# Patient Record
Sex: MALE | Race: BLACK OR AFRICAN AMERICAN | NOT HISPANIC OR LATINO | Employment: OTHER | URBAN - METROPOLITAN AREA
[De-identification: names, ages, dates, MRNs, and addresses within clinical notes are randomized per-mention and may not be internally consistent; named-entity substitution may affect disease eponyms.]

---

## 2017-11-14 ENCOUNTER — TELEPHONE (OUTPATIENT)
Dept: NEUROLOGY | Facility: HOSPITAL | Age: 67
End: 2017-11-14

## 2017-11-14 NOTE — TELEPHONE ENCOUNTER
Clinic appt scheduled with Karen, wife, on Wednesday, November 29, 2017 at 1045am.  Karen given clinic address and telephone number.  Karen repeated information correctly.

## 2017-11-29 ENCOUNTER — OFFICE VISIT (OUTPATIENT)
Dept: NEUROLOGY | Facility: HOSPITAL | Age: 67
End: 2017-11-29
Attending: INTERNAL MEDICINE
Payer: MEDICARE

## 2017-11-29 VITALS
DIASTOLIC BLOOD PRESSURE: 80 MMHG | RESPIRATION RATE: 16 BRPM | HEART RATE: 80 BPM | TEMPERATURE: 98 F | WEIGHT: 177 LBS | BODY MASS INDEX: 28.45 KG/M2 | HEIGHT: 66 IN | SYSTOLIC BLOOD PRESSURE: 190 MMHG

## 2017-11-29 DIAGNOSIS — D50.0 ANEMIA DUE TO CHRONIC BLOOD LOSS: Primary | ICD-10-CM

## 2017-11-29 PROCEDURE — 99215 OFFICE O/P EST HI 40 MIN: CPT | Performed by: INTERNAL MEDICINE

## 2017-11-29 RX ORDER — GLIPIZIDE 5 MG/1
5 TABLET ORAL
COMMUNITY

## 2017-11-29 RX ORDER — LEVOTHYROXINE SODIUM 50 UG/1
50 TABLET ORAL DAILY
COMMUNITY

## 2017-11-29 RX ORDER — NEBIVOLOL 2.5 MG/1
10 TABLET ORAL DAILY
COMMUNITY

## 2017-11-29 RX ORDER — MINOXIDIL 2.5 MG/1
2.5 TABLET ORAL 2 TIMES DAILY
COMMUNITY

## 2017-11-29 RX ORDER — DOXAZOSIN 2 MG/1
2 TABLET ORAL NIGHTLY
COMMUNITY

## 2017-11-29 RX ORDER — METOLAZONE 5 MG/1
5 TABLET ORAL DAILY
COMMUNITY

## 2017-11-29 RX ORDER — FUROSEMIDE 80 MG/1
120 TABLET ORAL 2 TIMES DAILY
COMMUNITY

## 2017-11-29 RX ORDER — PANTOPRAZOLE SODIUM 40 MG/1
40 TABLET, DELAYED RELEASE ORAL 2 TIMES DAILY
Status: ON HOLD | COMMUNITY
End: 2018-08-27 | Stop reason: HOSPADM

## 2017-11-29 RX ORDER — CINACALCET HYDROCHLORIDE 30 MG/1
60 TABLET, COATED ORAL
COMMUNITY

## 2017-11-29 RX ORDER — AMLODIPINE BESYLATE 10 MG/1
10 TABLET ORAL DAILY
COMMUNITY

## 2017-11-29 RX ORDER — RAMIPRIL 10 MG/1
10 CAPSULE ORAL DAILY
COMMUNITY

## 2017-11-29 NOTE — PROGRESS NOTES
[facility]    CC: anemia    HPI 67 y.o. male here for initial evaluation of recurrent, severe, normocytic anemia not associated with overt GI bleeding, but associated positive occult stool testing for blood and iron deficiency.  Pertinent medical history includes ESRD (on HD), and history of anemia of renal disease and chronic inflammation.  He reports first developing symptomatic anemia ~ 1 year ago.  He has averaged 2-4 units of pRBCS each month for the last year, without overt bleeding.    He has received EGD x 2, colonoscopy which were unrevealing and are reviewed below.  He also reports completing an unrevealing VCE, although this report is not available to me currently.  He was referred by Dr. Telles, who completed a bone marrow biopsy and aspirate which was also unrevealing.      He denies past or present significant NSAID use, but is a former smoker and moderate drinker (quit 20 years ago).  He has never taken olmesartan to his knowledge.  He has been taking oral iron.      Past medical history  HTN  DM  ESRD (on HD).  Formerly on home PD but complicated by Staph A infection.  Hypovitaminosis D  Anemia of renal disease  Anemia of chronic inflammation  Iron deficiency anemia    Past surgical history  PD catheter placement  LUE AV fistula placement    Social History  Former smoker, quit 20 years ago  Former drinker, quit 20 years ago    Family history:  Negative for GI malignancy or IBD    Review of Systems  General ROS: negative for chills, fever or weight loss, +decreased appetite  Psychological ROS: negative for hallucination, depression or suicidal ideation  Ophthalmic ROS: negative for blurry vision, photophobia or eye pain  ENT ROS: negative for epistaxis, sore throat or rhinorrhea  Respiratory ROS: no cough, shortness of breath, or wheezing  Cardiovascular ROS: no chest pain, +dyspnea on exertion  Gastrointestinal ROS: no abdominal pain, change in bowel habits, or black/ bloody  "stools  Genito-Urinary ROS: no dysuria, trouble voiding, or hematuria  Musculoskeletal ROS: negative for gait disturbance, +muscular weakness  Neurological ROS: no syncope or seizures; no ataxia  Dermatological ROS: negative for pruritis, rash and jaundice    Physical Examination  BP (!) 190/80 (BP Location: Right arm)   Pulse 80   Temp 98.3 °F (36.8 °C) (Oral)   Resp 16   Ht 5' 6" (1.676 m)   Wt 80.3 kg (177 lb 0.5 oz)   BMI 28.57 kg/m²   General appearance: alert, cooperative, no distress  HENT: Normocephalic, atraumatic, neck symmetrical, no nasal discharge   Eyes: conjunctivae/corneas clear, PERRL, EOM's intact  Lungs: clear to auscultation bilaterally, no dullness to percussion bilaterally  Heart: regular rate and rhythm without rub; no displacement of the PMI   Abdomen: soft, non-tender; bowel sounds normoactive; no organomegaly  Extremities: extremities symmetric; no clubbing, cyanosis, or edema, AV fistula to left upper extremity with palpable thrill  Integument: Skin color, texture, turgor normal; no rashes; hair distrubution normal  Neurologic: Alert and oriented X 3, normal strength, normal coordination and gait  Psychiatric: no pressured speech; normal affect; no evidence of impaired cognition     Labs:    OSH labs reviewed.  Noted to have pancytopenia with leukopenia (2.4), normocyctic anemia, and mild thrombocytopenia.    Imaging:    CXR 5/2016:    No active cardiopulmonary process    Endoscopic reports ([facility]):    EGD 2/22/17:    Impression  - Small hiatal hernia  - Multiple ulcers in the body, antrum, and angularis, clean based and 3-6mm, with biopsies positive for H. Pylori    Colonoscopy 2/15/17:    Impression  - Good quality prep  - small internal hemorrhoids  - 5mm pedunculated transverse colon polyp, snare removed. (TA without dysplasia)    EGD 7/2017:    Impression  - 2cm hiatal hernia  - healed pyloric channel ulcer with friability  - small volume of retained " food    Assessment:   67 year old AAM with multiple medical problems, with the most pertinent being ESRD on HD, and multifactorial anemia including iron deficiency, renal disease, and chronic inflammation.  Here for evaluation of VEENA with positive occult stool testing, with unrevealing EGD x 2, and colonoscopy.  VCE also reportedly unrevealing but a report is not currently available.  He has multifactorial anemia, but is occult positive and has a profound transfusion requirement every 2-3 weeks.  I suspect he may have angioectasias of the stomach/proximal small bowel/right colon, as he has multiple risks factors for this common problem including his age and ESRD, and these lesions can be missed on endoscopy and VCE.    Plan:   - I will schedule him for an EGD with push enteroscopy on December 14th with close examination of the small bowel and ablation of any lesions found  - He will need a stat CBC and CMP drawn on first arrival on day of endoscopy  - I have requested that he bring a copy of his VCE report on the day of his endoscopy for review although it was reportedly red as normal  - If push enteroscopy is unrevealing, I will schedule a provocative VCE as the next step:  Plavix 300mg (6pm evening before) and pradaxa 150mg (first dose 6pm night before) and second dose of pradaxa 150mg on morning of endoscopy.  - If the 2nd look capsule with pradaxa and plavix provocation fails to demonstrate any blood in the GI tract, a chronic GI bleed source seems unlikely such that anemia primarily related to hemolysis or inadequate production seems most likely - will need to discuss this case with his hematologist, Dr. Telles, at that time       Moustapha Guillen MD   [address] [place] [address], [address]   [place] [address] [phone]

## 2017-11-29 NOTE — PATIENT INSTRUCTIONS
You are scheduled for an EGD on ___Thursday, December 14, 2017_____    You should eat light meals the day before the procedure and nothing to eat or drink after midnight the night before your procedure.    You will need to be at the 1st floor admission desk at the hospital on Endoscopy staff to contact with arrival time__

## 2017-12-05 ENCOUNTER — TELEPHONE (OUTPATIENT)
Dept: NEUROLOGY | Facility: HOSPITAL | Age: 67
End: 2017-12-05

## 2017-12-05 NOTE — TELEPHONE ENCOUNTER
----- Message from Jennifer Bain LPN sent at 11/29/2017 11:56 AM CST -----  EGD on 12/14/17.  CPT 32607, [id number]..     Thanks.

## 2017-12-14 ENCOUNTER — HOSPITAL ENCOUNTER (OUTPATIENT)
Facility: HOSPITAL | Age: 67
Discharge: HOME | End: 2017-12-14
Attending: INTERNAL MEDICINE | Admitting: INTERNAL MEDICINE
Payer: MEDICARE

## 2017-12-14 ENCOUNTER — ANESTHESIA (OUTPATIENT)
Dept: ENDOSCOPY | Facility: HOSPITAL | Age: 67
End: 2017-12-14
Payer: MEDICARE

## 2017-12-14 ENCOUNTER — ANESTHESIA EVENT (OUTPATIENT)
Dept: ENDOSCOPY | Facility: HOSPITAL | Age: 67
End: 2017-12-14
Payer: MEDICARE

## 2017-12-14 ENCOUNTER — SURGERY (OUTPATIENT)
Age: 67
End: 2017-12-14

## 2017-12-14 DIAGNOSIS — D50.0 BLOOD LOSS ANEMIA: Primary | ICD-10-CM

## 2017-12-14 DIAGNOSIS — K55.20 ANGIODYSPLASIA: ICD-10-CM

## 2017-12-14 LAB
ALBUMIN SERPL BCP-MCNC: 3.1 G/DL
ALP SERPL-CCNC: 53 U/L
ALT SERPL W/O P-5'-P-CCNC: 10 U/L
ANION GAP SERPL CALC-SCNC: 13 MMOL/L
AST SERPL-CCNC: 22 U/L
BASOPHILS # BLD AUTO: 0.05 K/UL
BASOPHILS NFR BLD: 1.4 %
BILIRUB SERPL-MCNC: 0.4 MG/DL
BUN SERPL-MCNC: 64 MG/DL
CALCIUM SERPL-MCNC: 8.1 MG/DL
CHLORIDE SERPL-SCNC: 97 MMOL/L
CO2 SERPL-SCNC: 30 MMOL/L
CREAT SERPL-MCNC: 12.2 MG/DL
DIFFERENTIAL METHOD: ABNORMAL
EOSINOPHIL # BLD AUTO: 0.2 K/UL
EOSINOPHIL NFR BLD: 4.1 %
ERYTHROCYTE [DISTWIDTH] IN BLOOD BY AUTOMATED COUNT: 16.3 %
EST. GFR  (AFRICAN AMERICAN): 4 ML/MIN/1.73 M^2
EST. GFR  (NON AFRICAN AMERICAN): 4 ML/MIN/1.73 M^2
GLUCOSE SERPL-MCNC: 112 MG/DL (ref 70–110)
GLUCOSE SERPL-MCNC: 98 MG/DL
HCT VFR BLD AUTO: 25.2 %
HGB BLD-MCNC: 7.4 G/DL
LYMPHOCYTES # BLD AUTO: 0.4 K/UL
LYMPHOCYTES NFR BLD: 9.7 %
MCH RBC QN AUTO: 26.6 PG
MCHC RBC AUTO-ENTMCNC: 29.4 G/DL
MCV RBC AUTO: 91 FL
MONOCYTES # BLD AUTO: 0.5 K/UL
MONOCYTES NFR BLD: 14.6 %
NEUTROPHILS # BLD AUTO: 2.6 K/UL
NEUTROPHILS NFR BLD: 70.2 %
PLATELET # BLD AUTO: 122 K/UL
PMV BLD AUTO: 11.3 FL
POTASSIUM SERPL-SCNC: 3.6 MMOL/L
PROT SERPL-MCNC: 6.7 G/DL
RBC # BLD AUTO: 2.78 M/UL
SODIUM SERPL-SCNC: 140 MMOL/L
WBC # BLD AUTO: 3.7 K/UL

## 2017-12-14 PROCEDURE — 36415 COLL VENOUS BLD VENIPUNCTURE: CPT

## 2017-12-14 PROCEDURE — 80053 COMPREHEN METABOLIC PANEL: CPT

## 2017-12-14 PROCEDURE — 44799 UNLISTED PX SMALL INTESTINE: CPT | Performed by: INTERNAL MEDICINE

## 2017-12-14 PROCEDURE — 37000008 HC ANESTHESIA 1ST 15 MINUTES: Performed by: INTERNAL MEDICINE

## 2017-12-14 PROCEDURE — 63600175 PHARM REV CODE 636 W HCPCS: Performed by: NURSE ANESTHETIST, CERTIFIED REGISTERED

## 2017-12-14 PROCEDURE — 82962 GLUCOSE BLOOD TEST: CPT | Performed by: INTERNAL MEDICINE

## 2017-12-14 PROCEDURE — 85025 COMPLETE CBC W/AUTO DIFF WBC: CPT

## 2017-12-14 PROCEDURE — 37000009 HC ANESTHESIA EA ADD 15 MINS: Performed by: INTERNAL MEDICINE

## 2017-12-14 PROCEDURE — 44366 SMALL BOWEL ENDOSCOPY: CPT | Performed by: INTERNAL MEDICINE

## 2017-12-14 PROCEDURE — 27201028 HC NEEDLE, SCLERO: Performed by: INTERNAL MEDICINE

## 2017-12-14 PROCEDURE — 25000003 PHARM REV CODE 250: Performed by: INTERNAL MEDICINE

## 2017-12-14 PROCEDURE — 27202087 HC PROBE, APC: Performed by: INTERNAL MEDICINE

## 2017-12-14 RX ORDER — LIDOCAINE HCL/PF 100 MG/5ML
SYRINGE (ML) INTRAVENOUS
Status: DISCONTINUED | OUTPATIENT
Start: 2017-12-14 | End: 2017-12-14

## 2017-12-14 RX ORDER — SODIUM CHLORIDE 9 MG/ML
INJECTION, SOLUTION INTRAVENOUS CONTINUOUS
Status: DISCONTINUED | OUTPATIENT
Start: 2017-12-14 | End: 2017-12-14 | Stop reason: HOSPADM

## 2017-12-14 RX ORDER — FENTANYL CITRATE 50 UG/ML
INJECTION, SOLUTION INTRAMUSCULAR; INTRAVENOUS
Status: DISCONTINUED | OUTPATIENT
Start: 2017-12-14 | End: 2017-12-14

## 2017-12-14 RX ORDER — PROPOFOL 10 MG/ML
VIAL (ML) INTRAVENOUS
Status: DISCONTINUED | OUTPATIENT
Start: 2017-12-14 | End: 2017-12-14

## 2017-12-14 RX ORDER — PROPOFOL 10 MG/ML
VIAL (ML) INTRAVENOUS CONTINUOUS PRN
Status: DISCONTINUED | OUTPATIENT
Start: 2017-12-14 | End: 2017-12-14

## 2017-12-14 RX ADMIN — FENTANYL CITRATE 25 MCG: 50 INJECTION, SOLUTION INTRAMUSCULAR; INTRAVENOUS at 10:12

## 2017-12-14 RX ADMIN — PROPOFOL 100 MCG/KG/MIN: 10 INJECTION, EMULSION INTRAVENOUS at 10:12

## 2017-12-14 RX ADMIN — PROPOFOL 50 MG: 10 INJECTION, EMULSION INTRAVENOUS at 10:12

## 2017-12-14 RX ADMIN — LIDOCAINE HYDROCHLORIDE 75 MG: 20 INJECTION, SOLUTION INTRAVENOUS at 10:12

## 2017-12-14 RX ADMIN — SODIUM CHLORIDE: 0.9 INJECTION, SOLUTION INTRAVENOUS at 08:12

## 2017-12-14 NOTE — ANESTHESIA PREPROCEDURE EVALUATION
12/14/2017  Ki Roque is a 67 y.o., male for EGD under MAC.    Anesthesia Evaluation    I have reviewed the Patient Summary Reports.    I have reviewed the Nursing Notes.      Review of Systems  Anesthesia Hx:  No problems with previous Anesthesia Denies Hx of Anesthetic complications    Hematology/Oncology:         -- Anemia:   Cardiovascular:   Hypertension    Renal/:   Chronic Renal Disease, ESRD, Dialysis    Endocrine:   Diabetes        Physical Exam  General:  Well nourished    Airway/Jaw/Neck:  Airway Findings:      Chest/Lungs:  Chest/Lungs Findings:    Heart/Vascular:  Heart Findings:       Mental Status:  Mental Status Findings:       Lab Results   Component Value Date    WBC 3.70 (L) 12/14/2017    HGB 7.4 (L) 12/14/2017    HCT 25.2 (L) 12/14/2017     (L) 12/14/2017    ALT 10 12/14/2017    AST 22 12/14/2017     12/14/2017    K 3.6 12/14/2017    CL 97 12/14/2017    CREATININE 12.2 (H) 12/14/2017    BUN 64 (H) 12/14/2017    CO2 30 (H) 12/14/2017         Anesthesia Plan  Type of Anesthesia, risks & benefits discussed:  Anesthesia Type:  MAC, general  Patient's Preference:   Intra-op Monitoring Plan: standard ASA monitors  Intra-op Monitoring Plan Comments:   Post Op Pain Control Plan: multimodal analgesia  Post Op Pain Control Plan Comments:   Induction:    Beta Blocker:  Patient is not currently on a Beta-Blocker (No further documentation required).       Informed Consent: Patient understands risks and agrees with Anesthesia plan.  Questions answered. Anesthesia consent signed with patient.  ASA Score: 3     Day of Surgery Review of History & Physical:        Anesthesia Plan Notes: Labs pending        Ready For Surgery From Anesthesia Perspective.

## 2017-12-14 NOTE — DISCHARGE INSTRUCTIONS
Post EGD Discharge Instruction    Ki Roque  12/14/2017  Moustapha Guillen MD    RESTRICTIONS ON ACTIVITY:    -DO NOT drive a car, operate machinery or make critical decisions, or do activities that require coordination or balance for 24 hours.  -Following Day: Return to full activities including work.  -Diet: Eat and drink normally unless instructed otherwise.    TREATMENT FOR COMMON SIDE EFFECTS:  *Sore Throat - treat with throat lozenges, gargle with warm salt water.  *Mild abdominal pain & bloating- rest and take liquids only.    SYMPTOMS TO WATCH FOR AND REPORT TO YOUR PHYSICIAN:  1. Chills or fever occurring 24 hours after procedure.  2. Pain in chest.  3. SEVERE abdominal pain or bloating.  4. Rectal bleeding which could be maroon or black.    If you have any questions or problems, please call your Physician:    Moustapha Guillen MD     If a complication or emergency situation arises and you are unable to reach your Physician - GO TO THE EMERGENCY ROOM.

## 2017-12-14 NOTE — TRANSFER OF CARE
"Anesthesia Transfer of Care Note    Patient: Ki Roque    Procedure(s) Performed: Procedure(s) (LRB):  ESOPHAGOGASTRODUODENOSCOPY (EGD)  with push enteroscopy (N/A)    Patient location: GI    Anesthesia Type: MAC    Transport from OR: Transported from OR on room air with adequate spontaneous ventilation    Post pain: adequate analgesia    Post assessment: no apparent anesthetic complications    Post vital signs: stable    Level of consciousness: awake    Nausea/Vomiting: no nausea/vomiting    Complications: none    Transfer of care protocol was followed      Last vitals:   Visit Vitals  BP (!) 183/70   Pulse 80   Temp 37.4 °C (99.4 °F)   Resp 20   Ht 5' 6" (1.676 m)   Wt 83 kg (183 lb)   SpO2 97%   BMI 29.54 kg/m²     "

## 2017-12-14 NOTE — H&P
[facility]     CC: anemia     HPI 67 y.o. male here for initial evaluation of recurrent, severe, normocytic anemia not associated with overt GI bleeding, but associated positive occult stool testing for blood and iron deficiency.  Pertinent medical history includes ESRD (on HD), and history of anemia of renal disease and chronic inflammation.  He reports first developing symptomatic anemia ~ 1 year ago.  He has averaged 2-4 units of pRBCS each month for the last year, without overt bleeding.     He has received EGD x 2, colonoscopy which were unrevealing and are reviewed below.  He also reports completing an unrevealing VCE, although this report is not available to me currently.  He was referred by Dr. Telles, who completed a bone marrow biopsy and aspirate which was also unrevealing.       He denies past or present significant NSAID use, but is a former smoker and moderate drinker (quit 20 years ago).  He has never taken olmesartan to his knowledge.  He has been taking oral iron.        Past medical history  HTN  DM  ESRD (on HD).  Formerly on home PD but complicated by Staph A infection.  Hypovitaminosis D  Anemia of renal disease  Anemia of chronic inflammation  Iron deficiency anemia     Past surgical history  PD catheter placement  LUE AV fistula placement     Social History  Former smoker, quit 20 years ago  Former drinker, quit 20 years ago     Family history:  Negative for GI malignancy or IBD     Review of Systems  General ROS: negative for chills, fever or weight loss, +decreased appetite  Psychological ROS: negative for hallucination, depression or suicidal ideation  Ophthalmic ROS: negative for blurry vision, photophobia or eye pain  ENT ROS: negative for epistaxis, sore throat or rhinorrhea  Respiratory ROS: no cough, shortness of breath, or wheezing  Cardiovascular ROS: no chest pain, +dyspnea on exertion  Gastrointestinal ROS: no abdominal pain, change in bowel habits, or black/ bloody  "stools  Genito-Urinary ROS: no dysuria, trouble voiding, or hematuria  Musculoskeletal ROS: negative for gait disturbance, +muscular weakness  Neurological ROS: no syncope or seizures; no ataxia  Dermatological ROS: negative for pruritis, rash and jaundice     Physical Examination  BP (!) 190/80 (BP Location: Right arm)   Pulse 80   Temp 98.3 °F (36.8 °C) (Oral)   Resp 16   Ht 5' 6" (1.676 m)   Wt 80.3 kg (177 lb 0.5 oz)   BMI 28.57 kg/m²   General appearance: alert, cooperative, no distress  HENT: Normocephalic, atraumatic, neck symmetrical, no nasal discharge   Eyes: conjunctivae/corneas clear, PERRL, EOM's intact  Lungs: clear to auscultation bilaterally, no dullness to percussion bilaterally  Heart: regular rate and rhythm without rub; no displacement of the PMI   Abdomen: soft, non-tender; bowel sounds normoactive; no organomegaly  Extremities: extremities symmetric; no clubbing, cyanosis, or edema, AV fistula to left upper extremity with palpable thrill  Integument: Skin color, texture, turgor normal; no rashes; hair distrubution normal  Neurologic: Alert and oriented X 3, normal strength, normal coordination and gait  Psychiatric: no pressured speech; normal affect; no evidence of impaired cognition      Labs:     OSH labs reviewed.  Noted to have pancytopenia with leukopenia (2.4), normocyctic anemia, and mild thrombocytopenia.     Imaging:     CXR 5/2016:     No active cardiopulmonary process     Endoscopic reports ([facility]):     EGD 2/22/17:     Impression  - Small hiatal hernia  - Multiple ulcers in the body, antrum, and angularis, clean based and 3-6mm, with biopsies positive for H. Pylori     Colonoscopy 2/15/17:     Impression  - Good quality prep  - small internal hemorrhoids  - 5mm pedunculated transverse colon polyp, snare removed. (TA without dysplasia)     EGD 7/2017:     Impression  - 2cm hiatal hernia  - healed pyloric channel ulcer with friability  - small volume of " retained food     Assessment:   67 year old AAM with multiple medical problems, with the most pertinent being ESRD on HD, and multifactorial anemia including iron deficiency, renal disease, and chronic inflammation.  Here for evaluation of VEENA with positive occult stool testing, with unrevealing EGD x 2, and colonoscopy.  VCE also reportedly unrevealing but a report is not currently available.  He has multifactorial anemia, but is occult positive and has a profound transfusion requirement every 2-3 weeks.  I suspect he may have angioectasias of the stomach/proximal small bowel/right colon, as he has multiple risks factors for this common problem including his age and ESRD, and these lesions can be missed on endoscopy and VCE.     Plan:   - I will schedule him for an EGD with push enteroscopy on today with close examination of the small bowel and ablation of any lesions found    - He will need a stat CBC and CMP drawn on first arrival on day of endoscopy  - I have requested that he bring a copy of his VCE report on the day of his endoscopy for review although it was reportedly red as normal  - If push enteroscopy is unrevealing, I will schedule a provocative VCE as the next step:  Plavix 300mg (6pm evening before) and pradaxa 150mg (first dose 6pm night before) and second dose of pradaxa 150mg on morning of endoscopy.  - If the 2nd look capsule with pradaxa and plavix provocation fails to demonstrate any blood in the GI tract, a chronic GI bleed source seems unlikely such that anemia primarily related to hemolysis or inadequate production seems most likely - will need to discuss this case with his hematologist, Dr. Telles, at that time         Moustapha Guillen MD   [address] [place] [address], [address]   [place] [address] [phone]

## 2017-12-14 NOTE — ANESTHESIA POSTPROCEDURE EVALUATION
"Anesthesia Post Evaluation    Patient: Ki Roque    Procedure(s) Performed: Procedure(s) (LRB):  ESOPHAGOGASTRODUODENOSCOPY (EGD)  with push enteroscopy (N/A)    Final Anesthesia Type: MAC  Patient location during evaluation: GI PACU  Patient participation: Yes- Able to Participate  Level of consciousness: awake and alert  Post-procedure vital signs: reviewed and stable  Pain management: adequate  Airway patency: patent  PONV status at discharge: No PONV  Anesthetic complications: no      Cardiovascular status: blood pressure returned to baseline and hemodynamically stable  Respiratory status: unassisted, spontaneous ventilation and room air  Hydration status: euvolemic  Follow-up not needed.        Visit Vitals  BP (!) 183/70   Pulse 80   Temp 37.4 °C (99.4 °F)   Resp 20   Ht 5' 6" (1.676 m)   Wt 83 kg (183 lb)   SpO2 97%   BMI 29.54 kg/m²       Pain/Megan Score: Pain Assessment Performed: Yes (12/14/2017  8:16 AM)  Presence of Pain: denies (12/14/2017  8:16 AM)      "

## 2017-12-15 VITALS
DIASTOLIC BLOOD PRESSURE: 63 MMHG | HEIGHT: 66 IN | BODY MASS INDEX: 29.41 KG/M2 | OXYGEN SATURATION: 91 % | HEART RATE: 71 BPM | RESPIRATION RATE: 16 BRPM | WEIGHT: 183 LBS | TEMPERATURE: 99 F | SYSTOLIC BLOOD PRESSURE: 137 MMHG

## 2017-12-28 ENCOUNTER — TELEPHONE (OUTPATIENT)
Dept: NEUROLOGY | Facility: HOSPITAL | Age: 67
End: 2017-12-28

## 2017-12-28 NOTE — TELEPHONE ENCOUNTER
Per Mima with Dr. Manriquez's office, pt results from weekly hgb today 6.6, a week ago-7.0.  Dr. Manriquez would like to know Dr. Guillen 's recommendation. Mima given Dr. Guillen recommendations from procedure on 12/14/17:if pt's anemia fails to improve the plan is to repeat SBE technique to evaluate for any additional angioectasias which might be amenable to ablation. If this is unrevealing,  plan a provocative upper DBE prior to applying for sandostatin injections.  Mima/Dr. Manriquez to be notified with scheduling of repeat SBE.  Mima acknowledged understanding.

## 2017-12-28 NOTE — TELEPHONE ENCOUNTER
----- Message from Marcela Crenshaw sent at 12/28/2017  2:30 PM CST -----  Contact: Mima with Dr Declan LONGORIA- Mima with Dr Lopez office would like to speak to the nurse in regards to lab results. Call back number is [phone] option 4.

## 2018-01-03 ENCOUNTER — TELEPHONE (OUTPATIENT)
Dept: NEUROLOGY | Facility: HOSPITAL | Age: 68
End: 2018-01-03

## 2018-01-03 DIAGNOSIS — D50.0 BLOOD LOSS ANEMIA: Primary | ICD-10-CM

## 2018-01-03 NOTE — TELEPHONE ENCOUNTER
Repeat Upper SBE scheduled with pt on Monday, January 22, 2018. Pt given instructions as follows: You are scheduled for an Upper SBE on _________________________________    You should eat light meals the day before the procedure and nothing to eat or drink after midnight the night before your procedure.    You will need to be at the 1st floor admission desk at the hospital Endoscopy staff will contact with arrival time.    Pt repeated information correctly.

## 2018-01-22 ENCOUNTER — ANESTHESIA EVENT (OUTPATIENT)
Dept: ENDOSCOPY | Facility: HOSPITAL | Age: 68
End: 2018-01-22
Payer: MEDICARE

## 2018-01-22 ENCOUNTER — HOSPITAL ENCOUNTER (OUTPATIENT)
Facility: HOSPITAL | Age: 68
Discharge: HOME | End: 2018-01-22
Attending: INTERNAL MEDICINE | Admitting: INTERNAL MEDICINE
Payer: MEDICARE

## 2018-01-22 ENCOUNTER — ANESTHESIA (OUTPATIENT)
Dept: ENDOSCOPY | Facility: HOSPITAL | Age: 68
End: 2018-01-22
Payer: MEDICARE

## 2018-01-22 ENCOUNTER — SURGERY (OUTPATIENT)
Age: 68
End: 2018-01-22

## 2018-01-22 DIAGNOSIS — Z01.818 PREOP EXAMINATION: ICD-10-CM

## 2018-01-22 DIAGNOSIS — D50.0 IRON DEFICIENCY ANEMIA DUE TO CHRONIC BLOOD LOSS: Primary | ICD-10-CM

## 2018-01-22 LAB
ANION GAP SERPL CALC-SCNC: 12 MMOL/L
BUN SERPL-MCNC: 77 MG/DL
CALCIUM SERPL-MCNC: 9.8 MG/DL
CHLORIDE SERPL-SCNC: 101 MMOL/L
CO2 SERPL-SCNC: 33 MMOL/L
CREAT SERPL-MCNC: 14.6 MG/DL
EST. GFR  (AFRICAN AMERICAN): 3 ML/MIN/1.73 M^2
EST. GFR  (NON AFRICAN AMERICAN): 3 ML/MIN/1.73 M^2
GLUCOSE SERPL-MCNC: 139 MG/DL
GLUCOSE SERPL-MCNC: 144 MG/DL (ref 70–110)
POTASSIUM SERPL-SCNC: 4.4 MMOL/L
SODIUM SERPL-SCNC: 146 MMOL/L

## 2018-01-22 PROCEDURE — 63600175 PHARM REV CODE 636 W HCPCS: Performed by: ANESTHESIOLOGY

## 2018-01-22 PROCEDURE — 25000003 PHARM REV CODE 250: Performed by: NURSE ANESTHETIST, CERTIFIED REGISTERED

## 2018-01-22 PROCEDURE — 93005 ELECTROCARDIOGRAM TRACING: CPT

## 2018-01-22 PROCEDURE — 37000009 HC ANESTHESIA EA ADD 15 MINS: Performed by: INTERNAL MEDICINE

## 2018-01-22 PROCEDURE — 27201238 HC BALLOON, OVERTUBE (ANY): Performed by: INTERNAL MEDICINE

## 2018-01-22 PROCEDURE — 37000008 HC ANESTHESIA 1ST 15 MINUTES: Performed by: INTERNAL MEDICINE

## 2018-01-22 PROCEDURE — 63600175 PHARM REV CODE 636 W HCPCS: Performed by: NURSE ANESTHETIST, CERTIFIED REGISTERED

## 2018-01-22 PROCEDURE — 27202087 HC PROBE, APC: Performed by: INTERNAL MEDICINE

## 2018-01-22 PROCEDURE — 25000003 PHARM REV CODE 250: Performed by: INTERNAL MEDICINE

## 2018-01-22 PROCEDURE — 80048 BASIC METABOLIC PNL TOTAL CA: CPT

## 2018-01-22 PROCEDURE — 82962 GLUCOSE BLOOD TEST: CPT | Performed by: INTERNAL MEDICINE

## 2018-01-22 PROCEDURE — 44378 SMALL BOWEL ENDOSCOPY: CPT | Performed by: INTERNAL MEDICINE

## 2018-01-22 RX ORDER — PROPOFOL 10 MG/ML
VIAL (ML) INTRAVENOUS
Status: DISCONTINUED | OUTPATIENT
Start: 2018-01-22 | End: 2018-01-22

## 2018-01-22 RX ORDER — ONDANSETRON 2 MG/ML
4 INJECTION INTRAMUSCULAR; INTRAVENOUS DAILY PRN
Status: DISCONTINUED | OUTPATIENT
Start: 2018-01-22 | End: 2018-01-22 | Stop reason: HOSPADM

## 2018-01-22 RX ORDER — VASOPRESSIN 20 [USP'U]/ML
INJECTION, SOLUTION INTRAMUSCULAR; SUBCUTANEOUS
Status: DISCONTINUED | OUTPATIENT
Start: 2018-01-22 | End: 2018-01-22

## 2018-01-22 RX ORDER — HYDRALAZINE HYDROCHLORIDE 20 MG/ML
5 INJECTION INTRAMUSCULAR; INTRAVENOUS ONCE
Status: COMPLETED | OUTPATIENT
Start: 2018-01-22 | End: 2018-01-22

## 2018-01-22 RX ORDER — HYDRALAZINE HYDROCHLORIDE 20 MG/ML
5 INJECTION INTRAMUSCULAR; INTRAVENOUS ONCE
Status: DISCONTINUED | OUTPATIENT
Start: 2018-01-22 | End: 2018-01-22 | Stop reason: HOSPADM

## 2018-01-22 RX ORDER — SODIUM CHLORIDE 0.9 % (FLUSH) 0.9 %
3 SYRINGE (ML) INJECTION
Status: DISCONTINUED | OUTPATIENT
Start: 2018-01-22 | End: 2018-01-22 | Stop reason: HOSPADM

## 2018-01-22 RX ORDER — SODIUM CHLORIDE 9 MG/ML
INJECTION, SOLUTION INTRAVENOUS CONTINUOUS
Status: DISCONTINUED | OUTPATIENT
Start: 2018-01-22 | End: 2018-01-22 | Stop reason: HOSPADM

## 2018-01-22 RX ORDER — ROCURONIUM BROMIDE 10 MG/ML
INJECTION, SOLUTION INTRAVENOUS
Status: DISCONTINUED | OUTPATIENT
Start: 2018-01-22 | End: 2018-01-22

## 2018-01-22 RX ORDER — HYDROMORPHONE HYDROCHLORIDE 2 MG/ML
0.4 INJECTION, SOLUTION INTRAMUSCULAR; INTRAVENOUS; SUBCUTANEOUS EVERY 5 MIN PRN
Status: DISCONTINUED | OUTPATIENT
Start: 2018-01-22 | End: 2018-01-22 | Stop reason: HOSPADM

## 2018-01-22 RX ORDER — LIDOCAINE HCL/PF 100 MG/5ML
SYRINGE (ML) INTRAVENOUS
Status: DISCONTINUED | OUTPATIENT
Start: 2018-01-22 | End: 2018-01-22

## 2018-01-22 RX ORDER — PHENYLEPHRINE HYDROCHLORIDE 10 MG/ML
INJECTION INTRAVENOUS
Status: DISCONTINUED | OUTPATIENT
Start: 2018-01-22 | End: 2018-01-22

## 2018-01-22 RX ORDER — SUCCINYLCHOLINE CHLORIDE 20 MG/ML
INJECTION INTRAMUSCULAR; INTRAVENOUS
Status: DISCONTINUED | OUTPATIENT
Start: 2018-01-22 | End: 2018-01-22

## 2018-01-22 RX ORDER — PROPOFOL 10 MG/ML
VIAL (ML) INTRAVENOUS CONTINUOUS PRN
Status: DISCONTINUED | OUTPATIENT
Start: 2018-01-22 | End: 2018-01-22

## 2018-01-22 RX ORDER — FENTANYL CITRATE 50 UG/ML
INJECTION, SOLUTION INTRAMUSCULAR; INTRAVENOUS
Status: DISCONTINUED | OUTPATIENT
Start: 2018-01-22 | End: 2018-01-22

## 2018-01-22 RX ADMIN — FENTANYL CITRATE 50 MCG: 50 INJECTION, SOLUTION INTRAMUSCULAR; INTRAVENOUS at 10:01

## 2018-01-22 RX ADMIN — PHENYLEPHRINE HYDROCHLORIDE 100 MCG: 10 INJECTION INTRAVENOUS at 11:01

## 2018-01-22 RX ADMIN — VASOPRESSIN 2 UNITS: 20 INJECTION, SOLUTION INTRAMUSCULAR; SUBCUTANEOUS at 11:01

## 2018-01-22 RX ADMIN — ROCURONIUM BROMIDE 5 MG: 10 INJECTION, SOLUTION INTRAVENOUS at 10:01

## 2018-01-22 RX ADMIN — SUCCINYLCHOLINE CHLORIDE 100 MG: 20 INJECTION, SOLUTION INTRAMUSCULAR; INTRAVENOUS at 10:01

## 2018-01-22 RX ADMIN — PROPOFOL 100 MG: 10 INJECTION, EMULSION INTRAVENOUS at 10:01

## 2018-01-22 RX ADMIN — SODIUM CHLORIDE: 900 INJECTION, SOLUTION INTRAVENOUS at 09:01

## 2018-01-22 RX ADMIN — PROPOFOL 100 MG: 10 INJECTION, EMULSION INTRAVENOUS at 11:01

## 2018-01-22 RX ADMIN — LIDOCAINE HYDROCHLORIDE 100 MG: 20 INJECTION, SOLUTION INTRAVENOUS at 10:01

## 2018-01-22 RX ADMIN — HYDRALAZINE HYDROCHLORIDE 5 MG: 20 INJECTION INTRAMUSCULAR; INTRAVENOUS at 12:01

## 2018-01-22 NOTE — TRANSFER OF CARE
"Anesthesia Transfer of Care Note    Patient: Ki Roque    Procedure(s) Performed: Procedure(s) (LRB):  SMALL BOWEL ENDOSCOPY-UPPER (N/A)    Patient location: PACU    Anesthesia Type: general    Transport from OR: Transported from OR on room air with adequate spontaneous ventilation    Post pain: adequate analgesia    Post assessment: no apparent anesthetic complications and tolerated procedure well    Post vital signs: stable    Level of consciousness: awake, alert and oriented    Nausea/Vomiting: no nausea/vomiting    Complications: none    Transfer of care protocol was followed      Last vitals:   Visit Vitals  BP (!) 169/77   Pulse 68   Temp 36.8 °C (98.2 °F) (Oral)   Resp 16   Ht 5' 6" (1.676 m)   Wt 79.8 kg (176 lb)   SpO2 95%   BMI 28.41 kg/m²     "

## 2018-01-22 NOTE — H&P
[facility]     CC: anemia     HPI 67 y.o. male here for initial evaluation of recurrent, severe, normocytic anemia not associated with overt GI bleeding, but associated positive occult stool testing for blood and iron deficiency.  Pertinent medical history includes ESRD (on HD), and history of anemia of renal disease and chronic inflammation.  He reports first developing symptomatic anemia ~ 1 year ago.  He has averaged 2-4 units of pRBCS each month for the last year, without overt bleeding.     He has received EGD x 2, colonoscopy which were unrevealing and are reviewed below.  He also reports completing an unrevealing VCE, although this report is not available to me currently.  He was referred by Dr. Telles, who completed a bone marrow biopsy and aspirate which was also unrevealing.      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     He denies past or present significant NSAID use, but is a former smoker and moderate drinker (quit 20 years ago).  He has never taken olmesartan to his knowledge.  He has been taking oral iron.        Past medical history  HTN  DM  ESRD (on HD).  Formerly on home PD but complicated by Staph A infection.  Hypovitaminosis D  Anemia of renal disease  Anemia of chronic inflammation  Iron deficiency anemia     Past surgical history  PD catheter placement  LUE AV fistula placement     Social History  Former smoker, quit 20 years ago  Former drinker, quit 20 years ago     Family history:  Negative for GI malignancy or IBD     Review of Systems  General ROS: negative for chills, fever or weight loss, +decreased appetite  Psychological ROS: negative for hallucination, depression or suicidal ideation  Ophthalmic ROS: negative for blurry vision, photophobia or eye pain  ENT ROS: negative for epistaxis, sore throat or rhinorrhea  Respiratory ROS: no cough, shortness of breath, or wheezing  Cardiovascular ROS:  "no chest pain, +dyspnea on exertion  Gastrointestinal ROS: no abdominal pain, change in bowel habits, or black/ bloody stools  Genito-Urinary ROS: no dysuria, trouble voiding, or hematuria  Musculoskeletal ROS: negative for gait disturbance, +muscular weakness  Neurological ROS: no syncope or seizures; no ataxia  Dermatological ROS: negative for pruritis, rash and jaundice     Physical Examination  BP (!) 190/80 (BP Location: Right arm)   Pulse 80   Temp 98.3 °F (36.8 °C) (Oral)   Resp 16   Ht 5' 6" (1.676 m)   Wt 80.3 kg (177 lb 0.5 oz)   BMI 28.57 kg/m²   General appearance: alert, cooperative, no distress  HENT: Normocephalic, atraumatic, neck symmetrical, no nasal discharge   Eyes: conjunctivae/corneas clear, PERRL, EOM's intact  Lungs: clear to auscultation bilaterally, no dullness to percussion bilaterally  Heart: regular rate and rhythm without rub; no displacement of the PMI   Abdomen: soft, non-tender; bowel sounds normoactive; no organomegaly  Extremities: extremities symmetric; no clubbing, cyanosis, or edema, AV fistula to left upper extremity with palpable thrill  Integument: Skin color, texture, turgor normal; no rashes; hair distrubution normal  Neurologic: Alert and oriented X 3, normal strength, normal coordination and gait  Psychiatric: no pressured speech; normal affect; no evidence of impaired cognition      Labs:     OSH labs reviewed.  Noted to have pancytopenia with leukopenia (2.4), normocyctic anemia, and mild thrombocytopenia.     Imaging:     CXR 5/2016:     No active cardiopulmonary process     Endoscopic reports ([facility]):     EGD 2/22/17:     Impression  - Small hiatal hernia  - Multiple ulcers in the body, antrum, and angularis, clean based and 3-6mm, with biopsies positive for H. Pylori     Colonoscopy 2/15/17:     Impression  - Good quality prep  - small internal hemorrhoids  - 5mm pedunculated transverse colon polyp, snare removed. (TA without " dysplasia)     EGD 7/2017:     Impression  - 2cm hiatal hernia  - healed pyloric channel ulcer with friability  - small volume of retained food     Assessment:   67 year old AAM with multiple medical problems, with the most pertinent being ESRD on HD, and multifactorial anemia including iron deficiency, renal disease, and chronic inflammation.  Here for evaluation of VEENA with positive occult stool testing, with unrevealing EGD x 2, and colonoscopy.  VCE also reportedly unrevealing but a report is not currently available.  He has multifactorial anemia, but is occult positive and has a profound transfusion requirement every 2-3 weeks.  I suspect he may have angioectasias of the stomach/proximal small bowel/right colon, as he has multiple risks factors for this common problem including his age and ESRD, and these lesions can be missed on endoscopy and VCE.  EGD with push enteroscopy showed a classic bleeding jejunal angioectasia which was ablated with APC on 12/14/17.  The patient did not have improvement in his anemia following this intervention.     Plan:     -Upper enteroscopy by single balloon  technique to evaluate for any additional   angioectasias which might be amenable to ablation.   -If this is unrevealing, I will plan a provocative upper DBE prior to applying for sandostatin injections.        Moustapha Guillen MD   [address] [facility], [address]   [place] [address] [phone]

## 2018-01-22 NOTE — ANESTHESIA POSTPROCEDURE EVALUATION
"Anesthesia Post Evaluation    Patient: Ki Roque    Procedure(s) Performed: Procedure(s) (LRB):  SMALL BOWEL ENDOSCOPY-UPPER (N/A)    Final Anesthesia Type: general  Patient location during evaluation: PACU  Patient participation: Yes- Able to Participate  Level of consciousness: awake and alert  Post-procedure vital signs: reviewed and stable  Pain management: adequate  Airway patency: patent  PONV status at discharge: No PONV  Anesthetic complications: no      Cardiovascular status: hemodynamically stable and blood pressure returned to baseline  Respiratory status: room air, spontaneous ventilation and unassisted  Follow-up not needed.        Visit Vitals  BP (!) 182/79   Pulse 66   Temp 36.8 °C (98.2 °F) (Oral)   Resp 18   Ht 5' 6" (1.676 m)   Wt 79.8 kg (176 lb)   SpO2 96%   BMI 28.41 kg/m²       Pain/Megan Score: Pain Assessment Performed: Yes (1/22/2018 12:09 PM)  Presence of Pain: denies (1/22/2018 12:09 PM)  Megan Score: 8 (1/22/2018 12:09 PM)      "

## 2018-01-22 NOTE — PLAN OF CARE
"BP better. Denies pain or discomfort @ this time. "Ok to release to room", per Dr Daley. Report called to Norman Woodard, with time allotted fir questions.   "

## 2018-01-22 NOTE — ANESTHESIA PREPROCEDURE EVALUATION
01/22/2018  Ki Roque is a 67 y.o., male for upper SBE under MAC/GA.    Pre-op Assessment    I have reviewed the Patient Summary Reports.     I have reviewed the Nursing Notes.      Review of Systems  Anesthesia Hx:  No problems with previous Anesthesia Denies Hx of Anesthetic complications   Denies Personal Hx of Anesthesia complications.   Hematology/Oncology:         -- Anemia:   Cardiovascular:   Hypertension    Renal/:   Chronic Renal Disease, ESRD, Dialysis    Endocrine:   Diabetes        Physical Exam  General:  Well nourished    Airway/Jaw/Neck:  Airway Findings:      Chest/Lungs:  Chest/Lungs Findings:    Heart/Vascular:  Heart Findings:       Mental Status:  Mental Status Findings:       Lab Results   Component Value Date    WBC 3.70 (L) 12/14/2017    HGB 7.4 (L) 12/14/2017    HCT 25.2 (L) 12/14/2017     (L) 12/14/2017    ALT 10 12/14/2017    AST 22 12/14/2017     (H) 01/22/2018    K 4.4 01/22/2018     01/22/2018    CREATININE 14.6 (H) 01/22/2018    BUN 77 (H) 01/22/2018    CO2 33 (H) 01/22/2018         Anesthesia Plan  Type of Anesthesia, risks & benefits discussed:  Anesthesia Type:  MAC, general  Patient's Preference:   Intra-op Monitoring Plan: standard ASA monitors  Intra-op Monitoring Plan Comments:   Post Op Pain Control Plan: multimodal analgesia  Post Op Pain Control Plan Comments:   Induction:    Beta Blocker:  Patient is not currently on a Beta-Blocker (No further documentation required).       Informed Consent: Patient understands risks and agrees with Anesthesia plan.  Questions answered. Anesthesia consent signed with patient.  ASA Score: 3     Day of Surgery Review of History & Physical:        Anesthesia Plan Notes: Labs pending        Ready For Surgery From Anesthesia Perspective.

## 2018-01-23 VITALS
OXYGEN SATURATION: 98 % | DIASTOLIC BLOOD PRESSURE: 88 MMHG | HEART RATE: 74 BPM | SYSTOLIC BLOOD PRESSURE: 190 MMHG | TEMPERATURE: 98 F | BODY MASS INDEX: 28.28 KG/M2 | RESPIRATION RATE: 20 BRPM | WEIGHT: 176 LBS | HEIGHT: 66 IN

## 2018-06-26 ENCOUNTER — TELEPHONE (OUTPATIENT)
Dept: NEUROLOGY | Facility: HOSPITAL | Age: 68
End: 2018-06-26

## 2018-06-26 NOTE — TELEPHONE ENCOUNTER
Mima with Dr. Holm, pt's hgb has dropped considerably from 14.7 to 9.8.  Pt has not received IV iron in several months due to not meeting criteria.  Pt given stool card.  Request made of Mima to fax labs to this clinic and Dr. Guillen will notify with recommendations.

## 2018-06-26 NOTE — TELEPHONE ENCOUNTER
Requested authorization for 2nd video capsul study.  Mima with Dr. Holm notified of Dr. Guillen plan to have second video capsule performed with pt receiving 300mg Plavix 16 hours prior to procedure as a blood thinner.  Mima to notify pt of plan.

## 2018-07-09 ENCOUNTER — TELEPHONE (OUTPATIENT)
Dept: NEUROLOGY | Facility: HOSPITAL | Age: 68
End: 2018-07-09

## 2018-07-09 DIAGNOSIS — D50.0 IRON DEFICIENCY ANEMIA SECONDARY TO BLOOD LOSS (CHRONIC): Primary | ICD-10-CM

## 2018-07-09 NOTE — TELEPHONE ENCOUNTER
Pt requesting status of procedure scheduled with Dr. Guillen.  Pt notified authorization not received from insurance company.  Pt notified he will be contacted upon approval.

## 2018-07-11 ENCOUNTER — TELEPHONE (OUTPATIENT)
Dept: NEUROLOGY | Facility: HOSPITAL | Age: 68
End: 2018-07-11

## 2018-07-11 NOTE — TELEPHONE ENCOUNTER
Pt notified of approved repeat video capsule.  Procedure scheduled on Wednesday, July 18, 2018.  Arrival time to first floor hospital admit at 8am.

## 2018-07-11 NOTE — TELEPHONE ENCOUNTER
Spoke with Crystal from [facility] at 1325, per Crystal patient is approved for [id number]. Auth dates 7/9-8/7/18.  Auth# [id number]  Thanks  abc

## 2018-07-11 NOTE — TELEPHONE ENCOUNTER
----- Message from Nelida Nielsen sent at 7/11/2018 10:25 AM CDT -----  Contact: Patient  GI- Patient called to speak to nurse regarding his procedure. Call back number [phone]

## 2018-07-18 ENCOUNTER — HOSPITAL ENCOUNTER (OUTPATIENT)
Facility: HOSPITAL | Age: 68
Discharge: HOME | End: 2018-07-18
Attending: INTERNAL MEDICINE | Admitting: INTERNAL MEDICINE
Payer: MEDICARE

## 2018-07-18 ENCOUNTER — SURGERY (OUTPATIENT)
Age: 68
End: 2018-07-18

## 2018-07-18 DIAGNOSIS — D50.0 IRON DEFICIENCY ANEMIA DUE TO CHRONIC BLOOD LOSS: ICD-10-CM

## 2018-07-18 PROCEDURE — 91110 GI TRC IMG INTRAL ESOPH-ILE: CPT | Performed by: INTERNAL MEDICINE

## 2018-07-18 PROCEDURE — 27200952 HC CAPSULE DELIVERY DEVICE: Performed by: INTERNAL MEDICINE

## 2018-07-18 NOTE — OR NURSING
Pill Cam administered by Dr. Rockwell, instructions given to patient , verbalized understanding  Copy of instructions given to patient

## 2018-07-18 NOTE — H&P
[facility]    CC: Anemia    HPI 68 y.o. male here for evaluation of recurrent, severe, normocytic anemia not associated with overt GI bleeding, but associated positive occult stool testing for blood and iron deficiency.  Pertinent medical history includes ESRD (on HD), and history of anemia of renal disease and chronic inflammation.  He reports first developing symptomatic anemia in early 2017 and previously required PRBC transfusions without overt bleeding.     He has received EGD x 2, colonoscopy which were unrevealing and are reviewed below.  He also reports completing an unrevealing VCE, although this report is not available to me currently.  He was previously referred by Dr. Telles, who completed a bone marrow biopsy and aspirate which was also unrevealing.       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    In 1/2018 he had an upper single balloon enteroscopy with Impression:  Impression:           - Successful ablation of three angioectasias in the                         jejunum including one classic lesion in the very                         proximal jejunum                        - History of recurrent, severe anemia resulting in                         the need for repeated PRBC transfusions every ~ 2-3                         weeks with previous ablation of one dense classic                         angioectasia in the proximal jejunum by recent push                         enteroscopy but this did not improve his anemia.                         This patient's anemia is likely compounded by a                         anemia of chronic disease associated with ESRD on                         HD.    He has been planned for second-look VCE to localize culprit lesion. He reports otherwise no acute issues today.    Past Medical History:   Diagnosis Date    Diabetes mellitus     Encounter for blood transfusion      Hemodialysis access, AV graft     Hypertension     Thyroid disease      Review of Systems  General ROS: negative for chills, fever or weight loss  Cardiovascular ROS: no chest pain or dyspnea on exertion  Gastrointestinal ROS: no abdominal pain, change in bowel habits, or black/ bloody stools.    Physical Examination  There were no vitals taken for this visit.  General appearance: alert, cooperative, no distress  HENT: Normocephalic, atraumatic, neck symmetrical, no nasal discharge   Lungs: Symmetric chest exapansion; in no acute respiratory distress  Heart: distal pulses intact bilaterally; no displacement of the PMI   Abdomen: soft, non-tender; protuberant but non-distended.  Extremities: extremities symmetric; no cyanosis or edema  Neurologic: Alert and oriented X 3, normal strength, normal coordination and gait    Labs:  Results for LUCI ROQUE (MRN [id number]) as of 7/18/2018 08:47   Ref. Range 1/22/2018 09:06   Sodium Latest Ref Range: 136 - 145 mmol/L 146 (H)   Potassium Latest Ref Range: 3.5 - 5.1 mmol/L 4.4   Chloride Latest Ref Range: 95 - 110 mmol/L 101   CO2 Latest Ref Range: 23 - 29 mmol/L 33 (H)   Anion Gap Latest Ref Range: 8 - 16 mmol/L 12   BUN, Bld Latest Ref Range: 8 - 23 mg/dL 77 (H)   Creatinine Latest Ref Range: 0.5 - 1.4 mg/dL 14.6 (H)   eGFR if non African American Latest Ref Range: >60 mL/min/1.73 m^2 3 (A)   eGFR if African American Latest Ref Range: >60 mL/min/1.73 m^2 3 (A)   Glucose Latest Ref Range: 70 - 110 mg/dL 139 (H)   Calcium Latest Ref Range: 8.7 - 10.5 mg/dL 9.8     12/14/17 CBC also reviewed, Hgb 7.4 / Hct 25.2. MCV 91.    Imaging:  I have reviewed the chart and no recent radiology studies available since last encounter. Last enteroscopy reviewed as discussed in HPI above.    Assessment:   Luci Roque is a 68 y.o. male patient with history of recurrent, severe, normocytic anemia not associated with overt GI bleeding, but associated positive occult stool testing for  blood and iron deficiency.     Plan:  VCE today. If unrevealing, a trial of either thalidomide or sandostatin LAR injections may be considered as a next step (likely sandostatin first).      Moustapha Guillen MD   [address] [place] [address], [address]   [place] [address] [phone]

## 2018-07-19 ENCOUNTER — TELEPHONE (OUTPATIENT)
Dept: NEUROLOGY | Facility: HOSPITAL | Age: 68
End: 2018-07-19

## 2018-07-19 NOTE — TELEPHONE ENCOUNTER
Called patient to review capsule endoscopy findings with patient. Normal capsule study.   Reviewed that given his history of multifactorial anemia for chronic disease as well as GI blood loss from small bowel angioectasias, the next step will be arrangement of monthly Sandostatin injections. The office will be arranging followup for these with him once approved. His questions regarding plan were answered.

## 2018-07-19 NOTE — TELEPHONE ENCOUNTER
----- Message from Moustapha Guillen MD sent at 7/19/2018 11:06 AM CDT -----  Happy birthday!     Can you please help arrange for this patient to get Sandostatin LAR injections 20mg once a month at our office? See video capsule report under procedures

## 2018-07-19 NOTE — [employer]ATION PATIENT INSTRUCTIONS
Discharge Summary/Instructions after an Endoscopic Procedure  Patient Name: iK Knight  Patient MRN: [id number]  Patient YOB: 1950 Wednesday, July 18, 2018  Moustapha Guillen MD  RESTRICTIONS:  During your procedure today, you received medications for sedation.  These   medications may affect your judgment, balance and coordination.  Therefore,   for 24 hours, you have the following restrictions:   - DO NOT drive a car, operate machinery, make legal/financial decisions,   sign important papers or drink alcohol.    ACTIVITY:  Today: no heavy lifting, straining or running due to procedural   sedation/anesthesia.  The following day: return to full activity including work.  DIET:  Eat and drink normally unless instructed otherwise.     TREATMENT FOR COMMON SIDE EFFECTS:  - Mild abdominal pain, nausea, belching, bloating or excessive gas:  rest,   eat lightly and use a heating pad.  - Sore Throat: treat with throat lozenges and/or gargle with warm salt   water.  - Because air was used during the procedure, expelling large amounts of air   from your rectum or belching is normal.  - If a bowel prep was taken, you may not have a bowel movement for 1-3 days.    This is normal.  SYMPTOMS TO WATCH FOR AND REPORT TO YOUR PHYSICIAN:  1. Abdominal pain or bloating, other than gas cramps.  2. Chest pain.  3. Back pain.  4. Signs of infection such as: chills or fever occurring within 24 hours   after the procedure.  5. Rectal bleeding, which would show as bright red, maroon, or black stools.   (A tablespoon of blood from the rectum is not serious, especially if   hemorrhoids are present.)  6. Vomiting.  7. Weakness or dizziness.  GO DIRECTLY TO THE NEAREST EMERGENCY ROOM IF YOU HAVE ANY OF THE FOLLOWING:      Difficulty breathing              Chills and/or fever over 101 F   Persistent vomiting and/or vomiting blood   Severe abdominal pain   Severe chest pain   Black, tarry stools   Bleeding- more than one  tablespoon   Any other symptom or condition that you feel may need urgent attention  Your doctor recommends these additional instructions:  If any biopsies were taken, your doctors clinic will contact you in 1 to 2   weeks with any results.  Plan Sandostatin LAR injections as next step 20mg IM once per month. If this   fails to improve his anemia, future options for therapy include either   repeat upper DBE on both plavix and pradaxa vs a trial of thalidomide   Discharge to home   Condition stable   Resume previous diet   The signs and symptoms of potential delayed complications were discussed   with the patient. If signs or symptoms of these complications develop, call   the [facility] at [phone].   Return to normal activities tomorrow.  Written discharge instructions were   provided to the patient.  For questions, problems or results please call your physician - Moustapha Guillen MD at Work:  [phone].  EMERGENCY PHONE NUMBER: [phone],  LAB RESULTS: [phone]  IF A COMPLICATION OR EMERGENCY SITUATION ARISES AND YOU ARE UNABLE TO REACH   YOUR PHYSICIAN - GO DIRECTLY TO THE EMERGENCY ROOM.  MD Moustapha Prabhakar MD  7/19/2018 10:59:19 AM  This report has been verified and signed electronically.  [employer]

## 2018-07-31 ENCOUNTER — TELEPHONE (OUTPATIENT)
Dept: HEMATOLOGY/ONCOLOGY | Facility: CLINIC | Age: 68
End: 2018-07-31

## 2018-07-31 NOTE — TELEPHONE ENCOUNTER
Patient request that injection be given at the [facility] in [place].  Informed patient that I would speak with Dr. Telles and [place] would call him to arrange an appointment and injection. Pt verbalized understanding.

## 2018-08-02 ENCOUNTER — TELEPHONE (OUTPATIENT)
Dept: NEUROLOGY | Facility: HOSPITAL | Age: 68
End: 2018-08-02

## 2018-08-02 NOTE — TELEPHONE ENCOUNTER
"Pt scheduled to have Sandostatin 20mg injections at [facility] in [place].  Pt states "They called me and will call back to schedule".  "

## 2018-08-07 ENCOUNTER — TELEPHONE (OUTPATIENT)
Dept: INFUSION THERAPY | Facility: HOSPITAL | Age: 68
End: 2018-08-07

## 2018-08-07 NOTE — TELEPHONE ENCOUNTER
----- Message from Shakira John, RN sent at 8/3/2018  1:33 PM CDT -----  The patient is going to be seen in [place] after Dr. Telles built plan here.    Thanks for letting us know.    Sara    ----- Message -----  From: Addie Jones  Sent: 8/3/2018  12:51 PM  To: [facility] CARMON[facility] Staff    Patient sandostatin is approved doesn't look like Dr telles ever saw this patient   When do you want me to schedule this?

## 2018-08-08 ENCOUNTER — TELEPHONE (OUTPATIENT)
Dept: NEUROLOGY | Facility: HOSPITAL | Age: 68
End: 2018-08-08

## 2018-08-08 NOTE — TELEPHONE ENCOUNTER
Per Merle, pt must be seen by Dr. Telles prior to sandostatin injections.  Merle to contact pt to schedule.  Merle confirmed pt's current telephone number, prior attempts unsucessful.

## 2018-08-08 NOTE — TELEPHONE ENCOUNTER
----- Message from Marcela Crenshaw sent at 8/8/2018 11:35 AM CDT -----  Contact: Patient  GI-Patient is calling in regards to his upcoming appointments.  Call back number is [phone]

## 2018-08-08 NOTE — TELEPHONE ENCOUNTER
Pt requesting information on Sandostatin injections ordered by Dr. Guillen.  Pt request to have injections from [facility] in [place].  Pt instructed to contact [facility] in [place] for scheduling information.

## 2018-08-09 ENCOUNTER — TELEPHONE (OUTPATIENT)
Dept: NEUROLOGY | Facility: HOSPITAL | Age: 68
End: 2018-08-09

## 2018-08-09 NOTE — TELEPHONE ENCOUNTER
Mima with [facility], requesting authorization status of Sandostatin.  Mima states pt made several inquiries of why he hasn't received Sandostatin injections.  Mima notified authorization pending infusion location selected by pt in [place]. Pt has to establish care with Dr. Telles prior to injections.  Dr. Telles's staff to contact pt.

## 2018-08-09 NOTE — TELEPHONE ENCOUNTER
"Trevor called with patient on the line, patient stated to me that he wanted to start getting his first injection here because he is "feeling bad."  He has an appointment with his physician in [place] to re-establish care on Aug 30.  Per patient's request, I will start authorization today.  Nurse informed and requested treatment plan be put in place.  "

## 2018-08-09 NOTE — TELEPHONE ENCOUNTER
Pt notified authorization pending approval for Sandostatin injection in [place]. When approval received he will be contacted by [facility] staff with appt.

## 2018-08-10 ENCOUNTER — TELEPHONE (OUTPATIENT)
Dept: NEUROLOGY | Facility: HOSPITAL | Age: 68
End: 2018-08-10

## 2018-08-10 NOTE — TELEPHONE ENCOUNTER
Call received from Dr. Ramon Manriquez, Nephrologist, [place] [facility]  [place] admitted in hospital on yesterday, 8/9/18 with gi bleeding.  Pt had coronary stent placed recently and placed on aspirin and (?).  Dr. Manriquez notified by pt of injections Dr. Guillen ordered and requested the name of medication ordered. Dr. Manriquez notified Dr. Guillen recommended pt have Sandostatin 20mg monthly for 6 months for gi bleed.  Pt requested to have medication administered in [place], until on 8/9/18, when pt requested to received medication in [place].  Pt notified on 8/9/18, insurance authorization placed and pending approval will be contacted with date and time by Infusion staff.

## 2018-08-13 ENCOUNTER — ANESTHESIA EVENT (OUTPATIENT)
Dept: INTENSIVE CARE | Facility: HOSPITAL | Age: 68
DRG: 377 | End: 2018-08-13
Payer: MEDICARE

## 2018-08-13 ENCOUNTER — ANESTHESIA (OUTPATIENT)
Dept: INTENSIVE CARE | Facility: HOSPITAL | Age: 68
DRG: 377 | End: 2018-08-13
Payer: MEDICARE

## 2018-08-13 ENCOUNTER — HOSPITAL ENCOUNTER (INPATIENT)
Facility: HOSPITAL | Age: 68
LOS: 6 days | Discharge: HOME | DRG: 377 | End: 2018-08-19
Attending: FAMILY MEDICINE | Admitting: FAMILY MEDICINE
Payer: MEDICARE

## 2018-08-13 ENCOUNTER — DOCUMENTATION ONLY (OUTPATIENT)
Dept: NEUROLOGY | Facility: HOSPITAL | Age: 68
End: 2018-08-13

## 2018-08-13 DIAGNOSIS — I25.10 CORONARY ARTERY DISEASE INVOLVING NATIVE HEART WITHOUT ANGINA PECTORIS, UNSPECIFIED VESSEL OR LESION TYPE: ICD-10-CM

## 2018-08-13 DIAGNOSIS — K92.2 GASTROINTESTINAL HEMORRHAGE, UNSPECIFIED GASTROINTESTINAL HEMORRHAGE TYPE: ICD-10-CM

## 2018-08-13 DIAGNOSIS — N18.6 ESRD (END STAGE RENAL DISEASE): ICD-10-CM

## 2018-08-13 DIAGNOSIS — K92.2 GI BLEED: ICD-10-CM

## 2018-08-13 DIAGNOSIS — D50.0 BLOOD LOSS ANEMIA: ICD-10-CM

## 2018-08-13 DIAGNOSIS — G44.209 ACUTE NON INTRACTABLE TENSION-TYPE HEADACHE: ICD-10-CM

## 2018-08-13 DIAGNOSIS — D50.0 IRON DEFICIENCY ANEMIA DUE TO CHRONIC BLOOD LOSS: ICD-10-CM

## 2018-08-13 DIAGNOSIS — K59.00 CONSTIPATION, UNSPECIFIED CONSTIPATION TYPE: ICD-10-CM

## 2018-08-13 DIAGNOSIS — I25.10 CORONARY ARTERY DISEASE, ANGINA PRESENCE UNSPECIFIED, UNSPECIFIED VESSEL OR LESION TYPE, UNSPECIFIED WHETHER NATIVE OR TRANSPLANTED HEART: ICD-10-CM

## 2018-08-13 DIAGNOSIS — K31.811 GASTROINTESTINAL HEMORRHAGE ASSOCIATED WITH ANGIODYSPLASIA OF STOMACH AND DUODENUM: ICD-10-CM

## 2018-08-13 DIAGNOSIS — K55.20 ANGIODYSPLASIA: Primary | ICD-10-CM

## 2018-08-13 LAB
ABO + RH BLD: NORMAL
ALBUMIN SERPL BCP-MCNC: 3 G/DL
ALP SERPL-CCNC: 35 U/L
ALT SERPL W/O P-5'-P-CCNC: 13 U/L
ANION GAP SERPL CALC-SCNC: 10 MMOL/L
AST SERPL-CCNC: 16 U/L
BASOPHILS # BLD AUTO: 0.02 K/UL
BASOPHILS NFR BLD: 0.5 %
BILIRUB SERPL-MCNC: 0.5 MG/DL
BLD GP AB SCN CELLS X3 SERPL QL: NORMAL
BUN SERPL-MCNC: 98 MG/DL
CALCIUM SERPL-MCNC: 8.7 MG/DL
CHLORIDE SERPL-SCNC: 101 MMOL/L
CO2 SERPL-SCNC: 28 MMOL/L
CREAT SERPL-MCNC: 11 MG/DL
DIFFERENTIAL METHOD: ABNORMAL
EOSINOPHIL # BLD AUTO: 0.2 K/UL
EOSINOPHIL NFR BLD: 4.8 %
ERYTHROCYTE [DISTWIDTH] IN BLOOD BY AUTOMATED COUNT: 17 %
EST. GFR  (AFRICAN AMERICAN): 5 ML/MIN/1.73 M^2
EST. GFR  (NON AFRICAN AMERICAN): 4 ML/MIN/1.73 M^2
ESTIMATED AVG GLUCOSE: 131 MG/DL
GLUCOSE SERPL-MCNC: 108 MG/DL
HBA1C MFR BLD HPLC: 6.2 %
HCT VFR BLD AUTO: 22.2 %
HGB BLD-MCNC: 7.5 G/DL
LYMPHOCYTES # BLD AUTO: 0.5 K/UL
LYMPHOCYTES NFR BLD: 12.6 %
MAGNESIUM SERPL-MCNC: 1.9 MG/DL
MCH RBC QN AUTO: 27.5 PG
MCHC RBC AUTO-ENTMCNC: 33.8 G/DL
MCV RBC AUTO: 81 FL
MONOCYTES # BLD AUTO: 0.4 K/UL
MONOCYTES NFR BLD: 9.5 %
NEUTROPHILS # BLD AUTO: 3.1 K/UL
NEUTROPHILS NFR BLD: 72.4 %
PHOSPHATE SERPL-MCNC: 3.7 MG/DL
PLATELET # BLD AUTO: 91 K/UL
PMV BLD AUTO: 10.9 FL
POCT GLUCOSE: 123 MG/DL (ref 70–110)
POTASSIUM SERPL-SCNC: 4.4 MMOL/L
PROT SERPL-MCNC: 5.8 G/DL
RBC # BLD AUTO: 2.73 M/UL
SODIUM SERPL-SCNC: 139 MMOL/L
TSH SERPL DL<=0.005 MIU/L-ACNC: 2.49 UIU/ML
WBC # BLD AUTO: 4.21 K/UL

## 2018-08-13 PROCEDURE — 80053 COMPREHEN METABOLIC PANEL: CPT

## 2018-08-13 PROCEDURE — 63600175 PHARM REV CODE 636 W HCPCS: Performed by: STUDENT IN AN ORGANIZED HEALTH CARE EDUCATION/TRAINING PROGRAM

## 2018-08-13 PROCEDURE — 86901 BLOOD TYPING SEROLOGIC RH(D): CPT

## 2018-08-13 PROCEDURE — 36000 PLACE NEEDLE IN VEIN: CPT | Performed by: ANESTHESIOLOGY

## 2018-08-13 PROCEDURE — 83735 ASSAY OF MAGNESIUM: CPT

## 2018-08-13 PROCEDURE — C9113 INJ PANTOPRAZOLE SODIUM, VIA: HCPCS | Performed by: STUDENT IN AN ORGANIZED HEALTH CARE EDUCATION/TRAINING PROGRAM

## 2018-08-13 PROCEDURE — 85025 COMPLETE CBC W/AUTO DIFF WBC: CPT

## 2018-08-13 PROCEDURE — 93005 ELECTROCARDIOGRAM TRACING: CPT

## 2018-08-13 PROCEDURE — 86920 COMPATIBILITY TEST SPIN: CPT

## 2018-08-13 PROCEDURE — 84100 ASSAY OF PHOSPHORUS: CPT

## 2018-08-13 PROCEDURE — 20000000 HC ICU ROOM

## 2018-08-13 PROCEDURE — 25000003 PHARM REV CODE 250: Performed by: STUDENT IN AN ORGANIZED HEALTH CARE EDUCATION/TRAINING PROGRAM

## 2018-08-13 PROCEDURE — 84443 ASSAY THYROID STIM HORMONE: CPT

## 2018-08-13 PROCEDURE — 83036 HEMOGLOBIN GLYCOSYLATED A1C: CPT

## 2018-08-13 PROCEDURE — S5010 5% DEXTROSE AND 0.45% SALINE: HCPCS | Performed by: STUDENT IN AN ORGANIZED HEALTH CARE EDUCATION/TRAINING PROGRAM

## 2018-08-13 PROCEDURE — 93010 ELECTROCARDIOGRAM REPORT: CPT | Mod: ,,, | Performed by: INTERNAL MEDICINE

## 2018-08-13 RX ORDER — HYDROCODONE BITARTRATE AND ACETAMINOPHEN 500; 5 MG/1; MG/1
TABLET ORAL
Status: DISCONTINUED | OUTPATIENT
Start: 2018-08-13 | End: 2018-08-15 | Stop reason: SDUPTHER

## 2018-08-13 RX ORDER — GLUCAGON 1 MG
1 KIT INJECTION
Status: DISCONTINUED | OUTPATIENT
Start: 2018-08-13 | End: 2018-08-19 | Stop reason: HOSPADM

## 2018-08-13 RX ORDER — INSULIN ASPART 100 [IU]/ML
0-5 INJECTION, SOLUTION INTRAVENOUS; SUBCUTANEOUS
Status: DISCONTINUED | OUTPATIENT
Start: 2018-08-13 | End: 2018-08-19 | Stop reason: HOSPADM

## 2018-08-13 RX ORDER — IBUPROFEN 200 MG
24 TABLET ORAL
Status: DISCONTINUED | OUTPATIENT
Start: 2018-08-13 | End: 2018-08-19 | Stop reason: HOSPADM

## 2018-08-13 RX ORDER — CARVEDILOL 3.12 MG/1
3.12 TABLET ORAL 2 TIMES DAILY
Status: DISCONTINUED | OUTPATIENT
Start: 2018-08-13 | End: 2018-08-19 | Stop reason: HOSPADM

## 2018-08-13 RX ORDER — LEVOTHYROXINE SODIUM 50 UG/1
50 TABLET ORAL
Status: DISCONTINUED | OUTPATIENT
Start: 2018-08-14 | End: 2018-08-19 | Stop reason: HOSPADM

## 2018-08-13 RX ORDER — ACETAMINOPHEN 325 MG/1
650 TABLET ORAL EVERY 6 HOURS PRN
Status: DISCONTINUED | OUTPATIENT
Start: 2018-08-13 | End: 2018-08-19 | Stop reason: HOSPADM

## 2018-08-13 RX ORDER — NAPROXEN SODIUM 220 MG/1
81 TABLET, FILM COATED ORAL DAILY
Status: DISCONTINUED | OUTPATIENT
Start: 2018-08-14 | End: 2018-08-19 | Stop reason: HOSPADM

## 2018-08-13 RX ORDER — AMLODIPINE BESYLATE 5 MG/1
10 TABLET ORAL DAILY
Status: DISCONTINUED | OUTPATIENT
Start: 2018-08-14 | End: 2018-08-19 | Stop reason: HOSPADM

## 2018-08-13 RX ORDER — MINOXIDIL 2.5 MG/1
2.5 TABLET ORAL 2 TIMES DAILY
Status: DISCONTINUED | OUTPATIENT
Start: 2018-08-13 | End: 2018-08-19 | Stop reason: HOSPADM

## 2018-08-13 RX ORDER — IBUPROFEN 200 MG
16 TABLET ORAL
Status: DISCONTINUED | OUTPATIENT
Start: 2018-08-13 | End: 2018-08-19 | Stop reason: HOSPADM

## 2018-08-13 RX ORDER — DEXTROSE MONOHYDRATE AND SODIUM CHLORIDE 5; .45 G/100ML; G/100ML
INJECTION, SOLUTION INTRAVENOUS CONTINUOUS
Status: DISCONTINUED | OUTPATIENT
Start: 2018-08-13 | End: 2018-08-15

## 2018-08-13 RX ORDER — HYDRALAZINE HYDROCHLORIDE 20 MG/ML
10 INJECTION INTRAMUSCULAR; INTRAVENOUS EVERY 6 HOURS PRN
Status: DISCONTINUED | OUTPATIENT
Start: 2018-08-13 | End: 2018-08-19 | Stop reason: HOSPADM

## 2018-08-13 RX ORDER — RAMELTEON 8 MG/1
8 TABLET ORAL NIGHTLY PRN
Status: DISCONTINUED | OUTPATIENT
Start: 2018-08-13 | End: 2018-08-19 | Stop reason: HOSPADM

## 2018-08-13 RX ORDER — SODIUM CHLORIDE 0.9 % (FLUSH) 0.9 %
5 SYRINGE (ML) INJECTION
Status: DISCONTINUED | OUTPATIENT
Start: 2018-08-13 | End: 2018-08-16

## 2018-08-13 RX ADMIN — HYDRALAZINE HYDROCHLORIDE 10 MG: 20 INJECTION INTRAMUSCULAR; INTRAVENOUS at 10:08

## 2018-08-13 RX ADMIN — DEXTROSE AND SODIUM CHLORIDE: 5; .45 INJECTION, SOLUTION INTRAVENOUS at 07:08

## 2018-08-13 RX ADMIN — TICAGRELOR 90 MG: 90 TABLET ORAL at 07:08

## 2018-08-13 RX ADMIN — RAMELTEON 8 MG: 8 TABLET, FILM COATED ORAL at 11:08

## 2018-08-13 RX ADMIN — DEXTROSE 8 MG/HR: 50 INJECTION, SOLUTION INTRAVENOUS at 07:08

## 2018-08-13 RX ADMIN — MINOXIDIL 2.5 MG: 2.5 TABLET ORAL at 08:08

## 2018-08-13 RX ADMIN — CARVEDILOL 3.12 MG: 3.12 TABLET, FILM COATED ORAL at 08:08

## 2018-08-13 NOTE — H&P
[facility]  History & Physical    SUBJECTIVE:     Chief Complaint/Reason for Admission: GI bleed     History of Present Illness:  Patient is a 68 y.o. AA male with hx of ESRD (HD MTTF), HTN, CAD s/p stenting (on 8/6/18 RCA on brillinta and ASA) and previous GI bleeds 2/2 small bowel  Transferred from OSH for GI work up and evaluation for GI bleed. At OSH, EGD showed gastritis and muliple small ulcers with no active bleedsing. H/H was unstable requiring 9 units over hospital course.     On arrival to [facility], patient reported fatigue,  some mild nausea and tenderness over the right groin where cath access was obtained on recent LHC. He denied bm today but reported melana over past week.     He had no other complaints. Denies cp/palpations, sob, dizziness, weakness, vomiting.     PTA Medications   Medication Sig    amLODIPine (NORVASC) 10 MG tablet Take 10 mg by mouth once daily.    aspirin-sod bicarb-citric acid (SHASHA-SELTZER) 324 mg TbEF Take 325 mg by mouth 2 (two) times daily.    CALCIUM ACETATE (PHOSLO ORAL) Take 2 tablets by mouth 2 (two) times daily before meals.    cinacalcet (SENSIPAR) 30 MG Tab Take 30 mg by mouth daily with breakfast.    doxazosin (CARDURA) 2 MG tablet Take 2 mg by mouth every evening.    epoetin neva (EPOGEN) 20,000 unit/mL injection Inject 20,000 Units into the skin 3 (three) times daily.    FOLIC ACID/VIT B COMPLEX AND C (LUIS-AMANDEEP ORAL) Take 1 tablet by mouth once daily.    furosemide (LASIX) 80 MG tablet Take 80 mg by mouth 2 (two) times daily.    glipiZIDE (GLUCOTROL) 5 MG tablet Take 5 mg by mouth 2 (two) times daily before meals.    levothyroxine (SYNTHROID) 50 MCG tablet Take 50 mcg by mouth once daily.    metOLazone (ZAROXOLYN) 5 MG tablet Take 5 mg by mouth once daily.    minoxidil (LONITEN) 2.5 MG tablet Take 2.5 mg by mouth once daily.    nebivolol (BYSTOLIC) 2.5 MG Tab Take 10 mg by mouth once daily.    pantoprazole (PROTONIX) 40 MG tablet Take  40 mg by mouth once daily.    ramipril (ALTACE) 10 MG capsule Take 10 mg by mouth once daily.       Review of patient's allergies indicates:  No Known Allergies    Past Medical History:   Diagnosis Date    Diabetes mellitus     Encounter for blood transfusion     Hemodialysis access, AV graft     Hypertension     Thyroid disease      Past Surgical History:   Procedure Laterality Date    graft placement        Family History   Problem Relation Age of Onset    Cancer Father      Social History     Tobacco Use    Smoking status: Former Smoker     Last attempt to quit: 2009     Years since quittin.6    Smokeless tobacco: Never Used   Substance Use Topics    Alcohol use: No    Drug use: No        Review of Systems:  Constitutional: Negative for chills  and fever. + fatigue   HENT: Negative for congestion, rhinorrhea and sore throat.    Eyes: Negative for visual disturbance.   Respiratory: Negative for cough, chest tightness and shortness of breath.    Cardiovascular: Negative for chest pain, palpitations and leg swelling.   Gastrointestinal: + ab distention, mild nausea, + melana.  Negative vominting, diarrhea, constipation    Genitourinary: Negative for dysuria and hematuria.   Musculoskeletal: +pain right groin  Skin: Negative for rash.   Neurological: Negative for dizziness, light-headedness and headaches.   Psychiatric/Behavioral:  Negative for agitation.        OBJECTIVE:     Vital Signs (Most Recent):  Pulse: 74 (18)  Resp: (!) 49 (18)  BP: (!) 188/79 (18)  SpO2: 100 % (18)    Physical Exam:  Constitutional:  Awake, alert and oriented x 3, NAD  HENT: ncat, mmm, perrla, eomi, neck normal rom and supple   Cardiovascular: RRR no mrg  Pulmonary/Chest: Effort normal, CTA b/l no wheezes, rales, rhonchi   Abdominal: Soft. +bs, ntnd  Musculoskeletal: Normal range of motion.  no edema, tenderness or deformity.   Neurological:  AAOx3, no focal deficits noted,  CN II-XII grossly intact,  normal reflexes.   Skin: Skin is warm and dry. not diaphoretic. firm nodular at right groin at access site  Psychiatric:  normal mood and affect.  behavior is normal. Judgment and thought content normal.   Nursing note and vitals reviewed.      Laboratory:  Cbc, cmp, mg, phos pending  OSH: 8/13 H/H 8.2/23.5    ASSESSMENT/PLAN:     Patient is a 68 y.o. AA male with hx of ESRD (HD MTTF), HTN, CAD s/p stenting (on 8/6/18RCA on brillinta) and previous GI bleeds 2/2 small bowel  Transferred from OSH for GI work up and evaluation for GI bleed with plans for double bubble study +/-  Capsule in AM.     Problems  1. GI bleed  2. HTN  3. ESRD on HD MTTF  4. CAD s/p stenting   5. Normocytic anemia    Neuro  - awake, alert and oriented x 4  - no focal deficits  - CAM ICU negative  - No sedation    CVS  - /79 HR 74  - CXR pending  - EKG pending    HTN  - c/w coreg and amlodipine per OSH    CAD s/p recent stenting  - c/w brilita and asa, OK'd by GI  - pt reports tenderness of recent access site on right groin, will order warm compress.     Resp  - Sating well on RA  - cxr pending    FEN/GI  - Fluids: IV Fluids NS  - Electrolytes    -will continue to monitor and replete PRN     GI bleed  - likely 2/2 known hx of AVM   -  At OSH, EGD per general surgery revealed gastritis, duodenitis and multiple small punctate ulcers with no active bleeding .  - NPO  - Two large bore (> 18 gauge) IV obtained  - Protonix gtt ordered  - type and screened with 2 units prepared.   - Trending H/H q6h. Transfuse for goal Hb > 7  - GI consulted, patient known to Dr. Guillen  - Plan for GI studies in AM      Renal  ESRD on HD MTTF  - Last HD today 8/13  - post dialysis weight on 8/13 175 lbs 5 oz  - Nephro consulted    Heme/ID  Normocytic Anemia likely multifactorial blood loss and ESRD  -  OSH labs from 8/13 H/H 8.2/23.5  -  s/p 9 units during hospital stay at osh  -  Trending h/h   -   See GI bleed  above      Endocrine  DM2  -  A1c 6.2  -  Diet controlled at home  -  POCT glucose QID  -  SSI    Code: full  Diet: npo  Ppx: brilinta, asa ppi  Dispo: Follow up Henny Andrew D.O.  [facility] HO-3  08/13/2018

## 2018-08-13 NOTE — PROGRESS NOTES
Outside Transfer Acceptance Note     Patients name: Ki Roque     Transferring Physician or Mid-Level provider/Clinic giving report: Dr. Ramon Manriquez (Transferring Nephrologist from [facility])    Accepting Physician for admission to hospital: Pia Johnson M.D.      Date of acceptance:  August 13, 2018    Allergies:      Reason for transfer:  Needs more extensive endoscopy to locate bleeding     Report from Physician/Mid-Level Provider: 68 year old male with CAD, ESRD (home hemodialysis) and GI bleeding that started 2017 and he had upper and lower endoscopies and pill endoscopy in [place], [place], has small bowel AVMs and was also seen by Dr. Guillen ([facility]) who presented 8/9/18 with a H/H of 4.8 and 14.5, weakness, and melena.  The patient had a LHC done 8/6/18 and had a stent placed in his RCA and was started on Brillinta and aspirin.  The aspirin has been discontinued during this hospital stay but the Brillinta continued.  EGD per general surgery revealed gastritis, duodenitis and multiple small punctate ulcers with no active bleeding seen during the procedure.  The patient has required 9U PRBCs because he continues to bleed.  Hemoglobin this am (0400) is 7.7.      VS: P 64   R 19    BP  149/91  T  98.9F     SpO2  99% on RA    Labs: WBC 6.1 H/H  12.7/38.7, K+ 4.0, BUN/Cr 12/1.3    Radiographs:     To Do List upon arrival:  1. Consult Dr. Guillen for double balloon enteroscopy                                             2. Resume aspirin 81mg daily along with Brillinta                                             3. Serial CBC, obtain immediately upon arrival

## 2018-08-13 NOTE — PROGRESS NOTES
Patient known to me with recurrent, severe anemia attributed to a combination of anemia of chronic disease and chronic GI blood loss from multiple small bowel angioectasias. He is now admitted to an OSH with severe anemia and melena after starting DAPT following a coronary stent.   Recommend:   Transfer to our hospital tonight with plans for a video capsule study on Tuesday then upper DBE on Wednesday - possibly home Wednesday afternoon  Do not stop aspirin or Brilinta with recent cardiac stent

## 2018-08-14 ENCOUNTER — ANESTHESIA EVENT (OUTPATIENT)
Dept: ENDOSCOPY | Facility: HOSPITAL | Age: 68
DRG: 377 | End: 2018-08-14
Payer: MEDICARE

## 2018-08-14 LAB
ALBUMIN SERPL BCP-MCNC: 2.9 G/DL
ALP SERPL-CCNC: 34 U/L
ALT SERPL W/O P-5'-P-CCNC: 13 U/L
ANION GAP SERPL CALC-SCNC: 11 MMOL/L
AST SERPL-CCNC: 18 U/L
BASOPHILS # BLD AUTO: 0.05 K/UL
BASOPHILS NFR BLD: 1.1 %
BILIRUB SERPL-MCNC: 0.5 MG/DL
BUN SERPL-MCNC: 103 MG/DL
CALCIUM SERPL-MCNC: 8.8 MG/DL
CHLORIDE SERPL-SCNC: 101 MMOL/L
CO2 SERPL-SCNC: 25 MMOL/L
CREAT SERPL-MCNC: 11.8 MG/DL
DIFFERENTIAL METHOD: ABNORMAL
EOSINOPHIL # BLD AUTO: 0.2 K/UL
EOSINOPHIL NFR BLD: 4.5 %
ERYTHROCYTE [DISTWIDTH] IN BLOOD BY AUTOMATED COUNT: 16.7 %
EST. GFR  (AFRICAN AMERICAN): 4 ML/MIN/1.73 M^2
EST. GFR  (NON AFRICAN AMERICAN): 4 ML/MIN/1.73 M^2
FERRITIN SERPL-MCNC: 597 NG/ML
GLUCOSE SERPL-MCNC: 115 MG/DL
HCT VFR BLD AUTO: 21.6 %
HCT VFR BLD AUTO: 21.7 %
HCT VFR BLD AUTO: 22.3 %
HCT VFR BLD AUTO: 22.6 %
HCT VFR BLD AUTO: 23.6 %
HGB BLD-MCNC: 7.4 G/DL
HGB BLD-MCNC: 7.4 G/DL
HGB BLD-MCNC: 7.6 G/DL
HGB BLD-MCNC: 7.7 G/DL
HGB BLD-MCNC: 7.9 G/DL
IRON SERPL-MCNC: 57 UG/DL
LYMPHOCYTES # BLD AUTO: 0.5 K/UL
LYMPHOCYTES NFR BLD: 11.1 %
MAGNESIUM SERPL-MCNC: 2 MG/DL
MCH RBC QN AUTO: 27.8 PG
MCHC RBC AUTO-ENTMCNC: 34.1 G/DL
MCV RBC AUTO: 82 FL
MONOCYTES # BLD AUTO: 0.5 K/UL
MONOCYTES NFR BLD: 10.4 %
NEUTROPHILS # BLD AUTO: 3.4 K/UL
NEUTROPHILS NFR BLD: 72.9 %
PHOSPHATE SERPL-MCNC: 4.3 MG/DL
PLATELET # BLD AUTO: 90 K/UL
PLATELET BLD QL SMEAR: ABNORMAL
PMV BLD AUTO: 11.2 FL
POCT GLUCOSE: 113 MG/DL (ref 70–110)
POCT GLUCOSE: 147 MG/DL (ref 70–110)
POCT GLUCOSE: 151 MG/DL (ref 70–110)
POTASSIUM SERPL-SCNC: 4.4 MMOL/L
PROT SERPL-MCNC: 5.6 G/DL
RBC # BLD AUTO: 2.77 M/UL
SATURATED IRON: 27 %
SODIUM SERPL-SCNC: 137 MMOL/L
TOTAL IRON BINDING CAPACITY: 213 UG/DL
TRANSFERRIN SERPL-MCNC: 144 MG/DL
WBC # BLD AUTO: 4.7 K/UL

## 2018-08-14 PROCEDURE — 63600175 PHARM REV CODE 636 W HCPCS: Performed by: STUDENT IN AN ORGANIZED HEALTH CARE EDUCATION/TRAINING PROGRAM

## 2018-08-14 PROCEDURE — 0DJ07ZZ INSPECTION OF UPPER INTESTINAL TRACT, VIA NATURAL OR ARTIFICIAL OPENING: ICD-10-PCS | Performed by: INTERNAL MEDICINE

## 2018-08-14 PROCEDURE — 83735 ASSAY OF MAGNESIUM: CPT

## 2018-08-14 PROCEDURE — 84100 ASSAY OF PHOSPHORUS: CPT

## 2018-08-14 PROCEDURE — 83540 ASSAY OF IRON: CPT

## 2018-08-14 PROCEDURE — P9047 ALBUMIN (HUMAN), 25%, 50ML: HCPCS | Mod: JG

## 2018-08-14 PROCEDURE — 85025 COMPLETE CBC W/AUTO DIFF WBC: CPT

## 2018-08-14 PROCEDURE — 25000003 PHARM REV CODE 250: Performed by: INTERNAL MEDICINE

## 2018-08-14 PROCEDURE — 63600175 PHARM REV CODE 636 W HCPCS: Mod: JG

## 2018-08-14 PROCEDURE — 36415 COLL VENOUS BLD VENIPUNCTURE: CPT

## 2018-08-14 PROCEDURE — 82728 ASSAY OF FERRITIN: CPT

## 2018-08-14 PROCEDURE — 85014 HEMATOCRIT: CPT | Mod: 91

## 2018-08-14 PROCEDURE — 25000003 PHARM REV CODE 250: Performed by: STUDENT IN AN ORGANIZED HEALTH CARE EDUCATION/TRAINING PROGRAM

## 2018-08-14 PROCEDURE — 87340 HEPATITIS B SURFACE AG IA: CPT

## 2018-08-14 PROCEDURE — 80100016 HC MAINTENANCE HEMODIALYSIS

## 2018-08-14 PROCEDURE — 85018 HEMOGLOBIN: CPT

## 2018-08-14 PROCEDURE — C9113 INJ PANTOPRAZOLE SODIUM, VIA: HCPCS | Performed by: STUDENT IN AN ORGANIZED HEALTH CARE EDUCATION/TRAINING PROGRAM

## 2018-08-14 PROCEDURE — 80053 COMPREHEN METABOLIC PANEL: CPT

## 2018-08-14 PROCEDURE — 91110 GI TRC IMG INTRAL ESOPH-ILE: CPT | Performed by: INTERNAL MEDICINE

## 2018-08-14 PROCEDURE — 86706 HEP B SURFACE ANTIBODY: CPT

## 2018-08-14 PROCEDURE — 20000000 HC ICU ROOM

## 2018-08-14 RX ORDER — METOLAZONE 5 MG/1
5 TABLET ORAL DAILY
Status: DISCONTINUED | OUTPATIENT
Start: 2018-08-14 | End: 2018-08-19 | Stop reason: HOSPADM

## 2018-08-14 RX ORDER — CALCITRIOL 0.5 UG/1
0.5 CAPSULE ORAL DAILY
COMMUNITY

## 2018-08-14 RX ORDER — SODIUM CHLORIDE 9 MG/ML
INJECTION, SOLUTION INTRAVENOUS ONCE
Status: COMPLETED | OUTPATIENT
Start: 2018-08-14 | End: 2018-08-14

## 2018-08-14 RX ORDER — SODIUM BICARBONATE 650 MG/1
650 TABLET ORAL 2 TIMES DAILY
COMMUNITY

## 2018-08-14 RX ORDER — CALCITRIOL 0.25 UG/1
0.25 CAPSULE ORAL DAILY
Status: DISCONTINUED | OUTPATIENT
Start: 2018-08-14 | End: 2018-08-19 | Stop reason: HOSPADM

## 2018-08-14 RX ORDER — ALBUMIN HUMAN 250 G/1000ML
SOLUTION INTRAVENOUS
Status: COMPLETED
Start: 2018-08-14 | End: 2018-08-14

## 2018-08-14 RX ORDER — ONDANSETRON 2 MG/ML
8 INJECTION INTRAMUSCULAR; INTRAVENOUS ONCE
Status: COMPLETED | OUTPATIENT
Start: 2018-08-14 | End: 2018-08-14

## 2018-08-14 RX ORDER — CARVEDILOL 3.12 MG/1
3.12 TABLET ORAL 2 TIMES DAILY
COMMUNITY

## 2018-08-14 RX ORDER — ATORVASTATIN CALCIUM 80 MG/1
80 TABLET, FILM COATED ORAL NIGHTLY
COMMUNITY

## 2018-08-14 RX ORDER — SODIUM CHLORIDE 9 MG/ML
INJECTION, SOLUTION INTRAVENOUS
Status: DISCONTINUED | OUTPATIENT
Start: 2018-08-14 | End: 2018-08-15 | Stop reason: SDUPTHER

## 2018-08-14 RX ORDER — CALCIUM ACETATE 667 MG/1
1334 CAPSULE ORAL
Status: DISCONTINUED | OUTPATIENT
Start: 2018-08-14 | End: 2018-08-19 | Stop reason: HOSPADM

## 2018-08-14 RX ORDER — FUROSEMIDE 40 MG/1
120 TABLET ORAL 2 TIMES DAILY
Status: DISCONTINUED | OUTPATIENT
Start: 2018-08-14 | End: 2018-08-19 | Stop reason: HOSPADM

## 2018-08-14 RX ORDER — ALBUMIN HUMAN 250 G/1000ML
25 SOLUTION INTRAVENOUS ONCE
Status: COMPLETED | OUTPATIENT
Start: 2018-08-14 | End: 2018-08-14

## 2018-08-14 RX ORDER — METOCLOPRAMIDE HYDROCHLORIDE 5 MG/ML
10 INJECTION INTRAMUSCULAR; INTRAVENOUS ONCE
Status: DISCONTINUED | OUTPATIENT
Start: 2018-08-14 | End: 2018-08-19 | Stop reason: HOSPADM

## 2018-08-14 RX ADMIN — Medication 1 CAPSULE: at 08:08

## 2018-08-14 RX ADMIN — ALBUMIN HUMAN 25 G: 0.25 SOLUTION INTRAVENOUS at 11:08

## 2018-08-14 RX ADMIN — DEXTROSE 8 MG/HR: 50 INJECTION, SOLUTION INTRAVENOUS at 08:08

## 2018-08-14 RX ADMIN — AMLODIPINE BESYLATE 10 MG: 5 TABLET ORAL at 08:08

## 2018-08-14 RX ADMIN — RAMELTEON 8 MG: 8 TABLET, FILM COATED ORAL at 10:08

## 2018-08-14 RX ADMIN — LEVOTHYROXINE SODIUM 50 MCG: 50 TABLET ORAL at 05:08

## 2018-08-14 RX ADMIN — FUROSEMIDE 120 MG: 40 TABLET ORAL at 11:08

## 2018-08-14 RX ADMIN — DEXTROSE 8 MG/HR: 50 INJECTION, SOLUTION INTRAVENOUS at 12:08

## 2018-08-14 RX ADMIN — CARVEDILOL 3.12 MG: 3.12 TABLET, FILM COATED ORAL at 08:08

## 2018-08-14 RX ADMIN — CALCIUM ACETATE 1334 MG: 667 CAPSULE ORAL at 05:08

## 2018-08-14 RX ADMIN — FUROSEMIDE 120 MG: 40 TABLET ORAL at 09:08

## 2018-08-14 RX ADMIN — SODIUM CHLORIDE: 0.9 INJECTION, SOLUTION INTRAVENOUS at 11:08

## 2018-08-14 RX ADMIN — ALBUMIN HUMAN 25 G: 250 SOLUTION INTRAVENOUS at 11:08

## 2018-08-14 RX ADMIN — HYDRALAZINE HYDROCHLORIDE 10 MG: 20 INJECTION INTRAMUSCULAR; INTRAVENOUS at 06:08

## 2018-08-14 RX ADMIN — CARVEDILOL 3.12 MG: 3.12 TABLET, FILM COATED ORAL at 09:08

## 2018-08-14 RX ADMIN — DEXTROSE 8 MG/HR: 50 INJECTION, SOLUTION INTRAVENOUS at 11:08

## 2018-08-14 RX ADMIN — ONDANSETRON 8 MG: 2 INJECTION, SOLUTION INTRAMUSCULAR; INTRAVENOUS at 11:08

## 2018-08-14 RX ADMIN — CALCIUM ACETATE 1334 MG: 667 CAPSULE ORAL at 11:08

## 2018-08-14 RX ADMIN — MINOXIDIL 2.5 MG: 2.5 TABLET ORAL at 09:08

## 2018-08-14 RX ADMIN — ACETAMINOPHEN 650 MG: 325 TABLET ORAL at 10:08

## 2018-08-14 RX ADMIN — METOLAZONE 5 MG: 5 TABLET ORAL at 11:08

## 2018-08-14 RX ADMIN — ASPIRIN 81 MG 81 MG: 81 TABLET ORAL at 08:08

## 2018-08-14 RX ADMIN — DEXTROSE 8 MG/HR: 50 INJECTION, SOLUTION INTRAVENOUS at 05:08

## 2018-08-14 RX ADMIN — CALCITRIOL 0.25 MCG: 0.25 CAPSULE, LIQUID FILLED ORAL at 08:08

## 2018-08-14 RX ADMIN — HYDRALAZINE HYDROCHLORIDE 10 MG: 20 INJECTION INTRAMUSCULAR; INTRAVENOUS at 07:08

## 2018-08-14 NOTE — CONSULTS
[facility]    CC: melena    HPI   Mr. Roque is a 68 year old man with recurrent, severe, normocytic anemia not associated with melena. He reports melena for the last week prior to admission at OSH. At that time he had just had a negative video capsule repeated on 7/2018 and he was being set up for sandostatin injections outpatient for presumed small bowel angioectasias. Of note, he is ESRD on HD. However, prior to getting it set up, he was admitted to an OSH for severe anemia and melena. He required 9 units of blood and EGD there at the OSH showed gastritis and small non-bleeding ulcers. He also had a LHC with RCA stenting on 8/6/2018 and was started on DAPT (aspirin and brilinta).    His anemia symptoms started in early 2017 and since then he has had negative EGDs, colonoscopy, and VCE. But at that time he was not having overt GI bleeding, although still requiring transfusions. He also had negative bone marrow biopsy at OSH as well. On 12/2017 he had EGD with push enteroscopy which showed classic jejunal angioectasias that were bleeding and ablated as well as an upper SBE on 1/2018 with 3 angioectasias burned for active bleeding. However, anemia did not improve and was thought to be multifactorial in setting of ESRD. He had a repeat VCE on 7/2018 that was negative at which time sandostatin injections were recommended for suspected small bowel angioectasias.     No family at bedside to discuss his care, but case discussed with nursing staff. Chart review was performed and summarized above.     Past Medical History:  preDM  ESRD on HD  HTN  H/o GI bleeding  CAD with stenting  Normocytic anemia    Review of Systems  General ROS: negative for chills, fever or weight loss  Cardiovascular ROS: no chest pain or dyspnea on exertion  Gastrointestinal ROS: no abdominal pain, change in bowel habits, Positive for black stools    Physical Examination  BP (!) 173/75   Pulse 66   Temp 98 °F (36.7 °C) (Oral)   Resp  "11   Ht 5' 6" (1.676 m)   Wt 78.4 kg (172 lb 13.5 oz)   SpO2 99%   BMI 27.90 kg/m²   General appearance: alert, cooperative, no distress  HENT: Normocephalic, atraumatic, neck symmetrical, no nasal discharge   Lungs: clear to auscultation bilaterally, no dullness to percussion bilaterally  Heart: regular rate and rhythm without rub; no displacement of the PMI   Abdomen: soft, non-tender; bowel sounds normoactive; no organomegaly  Extremities: extremities symmetric; no clubbing, cyanosis, or edema, fistula noted to upper extremity  Neurologic: Alert and oriented X 3, normal strength, normal coordination and gait    Labs:  Hg 7.7  MCV 82  Plt 90    Alb 2.9    Ferritin 597    Imaging:  CXR 8/13/2018  Enlarged cardiac silhouette, no opacifications noted    I have personally reviewed and interpreted the above images.     Assessment:   Mr. Roque is a 68 year old man with severe anemia that is likely multifactorial from ESRD and small bowel angioectasias on DAPT (ASA/brilinta) following a RCA stent on 8/6/2018. He is now having melena and requiring multiple blood transfusions.     Plan:  - NPO at midnight last night  - video capsule placed today with 10mg IV reglan once  - will review the capsule images tomorrow and likely balloon assisted enteroscopy in the morning  - can eat this evening and NPO at midnight again tonight  - continue to transfuse with goal Hg >7  - do not stop DAPT right now    Thank you for this consult. Will discuss with staff, Dr. Guillen. Please call with any questions.     Rebecca Juarez MD MPH  [facility] PGY 4  [phone] cell  [phone] pager        "

## 2018-08-14 NOTE — PLAN OF CARE
[facility] Plan of Care Note    Dropped capsule at 9:15AM. OK to have a small snack or light lunch at 1:15PM. Can resume previous diet at 5:15PM this evening.     Reglan 10mg IV once ordered at time of capsule. Plan discussed with nursing staff. Call with questions. Full consult note to follow.    Rebecca Juarez MD MPH  \[facility]\"" Gastroenterology PGY 4  [phone] cell  [phone] pager

## 2018-08-14 NOTE — ANESTHESIA PROCEDURE NOTES
Peripheral IV Insertion    Diagnosis: GI bleed; needs transfusion    Patient location during procedure: ICU  Timeout: 8/13/2018 8:00 PM  Staffing  Anesthesiologist: Ambrosio Weathers MD  Performed: anesthesiologist   Anesthesiologist was present at the time of the procedure.Peripheral IV Insertion  Skin Prep: chlorhexidine gluconate and isopropyl alcohol  Local Infiltration: none  Orientation: left  Location: external jugular  Catheter Size: 20 GInsertion Attempts: 1  Assessment  Dressing: secured with tape and tegaderm  Patient: Tolerated well  Line flushed easily.

## 2018-08-14 NOTE — NURSING
Dr. Brannon at bedside. Aware of pt hypertension and difficulty getting lab and IV access. Will consult anesthesia to place IV. Questioned pt receiving brilinta and asa- ordered to give meds.    2040 Notified Dr. Brannon of pt H&H and questioned IVF orders. Ordered to give fluids and do not transfuse for now. Redraw H&H in 6 hours.

## 2018-08-14 NOTE — PLAN OF CARE
Blood pressure  remains elevated  Dr. Brannon notified, prefers to continue to monitor BP because of history of GI bleed recently.

## 2018-08-14 NOTE — PLAN OF CARE
Problem: Hemodialysis (Adult)  Goal: Signs and Symptoms of Listed Potential Problems Will be Absent, Minimized or Managed (Hemodialysis)  Signs and symptoms of listed potential problems will be absent, minimized or managed by discharge/transition of care (reference Hemodialysis (Adult) CPG).  Outcome: Ongoing (interventions implemented as appropriate)   08/14/18 1029   Hemodialysis   Problems Assessed (Hemodialysis) all   Problems Present (Hemodialysis) electrolyte imbalance;fluid imbalance   HD with Uf x 2.5h today. POC reviewed with pt.

## 2018-08-14 NOTE — PLAN OF CARE
Problem: Patient Care Overview  Goal: Plan of Care Review   08/14/18 0989   Coping/Psychosocial   Plan Of Care Reviewed With patient   Patient resting in bed comfortably. No pain during shift. No bowel movements. No blood transfusions required. Intermittent hypertension with otherwise stable vitals. Remains on IVFs and protonix gtt. NPO since midnight in preparation for GI scope. Continuous monitoring maintained.

## 2018-08-14 NOTE — PLAN OF CARE
TN went to meet with patient, no family at bedside. Patient is independent and lives at home with his wife. He does not have home health or medical equipment at home. Patient still drives, but his family will be able to transport patient home. His Primary Care Physician is Dr. Manriquez. He has seen Dr. Guillen previously for follow-ups.     08/14/18 4902   Discharge Assessment   Assessment Type Discharge Planning Assessment   Assessment information obtained from? Patient   Prior to hospitilization cognitive status: Alert/Oriented   Prior to hospitalization functional status: Independent   Current cognitive status: Alert/Oriented   Current Functional Status: Independent   Facility Arrived From: Transfer from an outside facility.    Lives With significant other   Able to Return to Prior Arrangements yes   Is patient able to care for self after discharge? Yes   Patient's perception of discharge disposition home or selfcare   Readmission Within The Last 30 Days no previous admission in last 30 days   Equipment Currently Used at Home none   Does the patient have transportation home? Yes   Transportation Available family or friend will provide   Dialysis Name and Scheduled days N/A   Discharge Plan A Home with family   Discharge Plan B Home with family;Home Health   Patient/Family In Agreement With Plan yes     Helene Theodore RN  Transition Navigator  [phone]

## 2018-08-14 NOTE — PLAN OF CARE
Problem: Patient Care Overview  Goal: Plan of Care Review  Outcome: Ongoing (interventions implemented as appropriate)   08/14/18 1603   Coping/Psychosocial   Plan Of Care Reviewed With patient     HD attempted today with low patient tolerance. Nausea, hypotension and lethargy noted during treatment. GI video capsule administered per GI team today. Pt tolerated well. Pt currently resting, with no c/o pain and no distress noted. VSS,. Possible upper scope tomorrow. Patient updated at the bedside. All safety and comfort measures ensured.     Problem: Fall Risk (Adult)  Goal: Identify Related Risk Factors and Signs and Symptoms  Related risk factors and signs and symptoms are identified upon initiation of Human Response Clinical Practice Guideline (CPG)  Outcome: Ongoing (interventions implemented as appropriate)   08/14/18 1603   Fall Risk   Related Risk Factors (Fall Risk) homeostatic imbalance;environment unfamiliar   Signs and Symptoms (Fall Risk) presence of risk factors       Problem: Gastrointestinal Bleeding (Adult)  Goal: Signs and Symptoms of Listed Potential Problems Will be Absent, Minimized or Managed (Gastrointestinal Bleeding)  Signs and symptoms of listed potential problems will be absent, minimized or managed by discharge/transition of care (reference Gastrointestinal Bleeding (Adult) CPG).  Outcome: Ongoing (interventions implemented as appropriate)   08/14/18 1603   Gastrointestinal Bleeding   Problems Assessed (GI Bleeding) all   Problems Present (GI Bleeding) none       Problem: Diabetes, Type 2 (Adult)  Goal: Signs and Symptoms of Listed Potential Problems Will be Absent, Minimized or Managed (Diabetes, Type 2)  Signs and symptoms of listed potential problems will be absent, minimized or managed by discharge/transition of care (reference Diabetes, Type 2 (Adult) CPG).  Outcome: Outcome(s) achieved Date Met: 08/14/18 08/14/18 1603   Diabetes, Type 2   Problems Present (Type 2 Diabetes) none        Problem: Kidney Disease, Chronic/End Stage Renal Disease (Adult)  Goal: Signs and Symptoms of Listed Potential Problems Will be Absent, Minimized or Managed (Kidney Disease, Chronic/End Stage Renal Disease)  Signs and symptoms of listed potential problems will be absent, minimized or managed by discharge/transition of care (reference Kidney Disease, Chronic/End Stage Renal Disease (Adult) CPG).  Outcome: Ongoing (interventions implemented as appropriate)   08/14/18 1603   Kidney Disease, Chronic/End Stage Renal Disease   Problems Assessed (Chronic Kidney Disease/ESRD) all   Problems Present (Chronic Kidney Disease/ESRD) hematologic complications       Problem: Hemodialysis (Adult)  Goal: Signs and Symptoms of Listed Potential Problems Will be Absent, Minimized or Managed (Hemodialysis)  Signs and symptoms of listed potential problems will be absent, minimized or managed by discharge/transition of care (reference Hemodialysis (Adult) CPG).  Outcome: Ongoing (interventions implemented as appropriate)   08/14/18 1603   Hemodialysis   Problems Assessed (Hemodialysis) all   Problems Present (Hemodialysis) hematologic complications;situational response      Pt unable to tolerate HD today

## 2018-08-14 NOTE — PLAN OF CARE
Arrived via ambulance from [facility]; alert, oriented, follows commands, moves all extremities ; complains of pain in R groin site of cardiac cath on 8/6/18, upon exam,  Area tender to the touch and a small hard raised area felt; questionable hematoma. See nurses assessment in Epic.

## 2018-08-14 NOTE — PROGRESS NOTES
"PGY-2 Progress Note [facility]  Follow up for: GI bleed    Hospital Stay Day 1    Subjective: Afebrile, BP elevated overnight, denies bowel movement, passing flatus.  Currently NPO for EGD this AM.  Denies BRBPR, reports dark/tarry stools.     Denies any CP, SOB, N/V/D, headaches, fever or chills.     Scheduled Meds:   amLODIPine  10 mg Oral Daily    aspirin  81 mg Oral Daily    carvedilol  3.125 mg Oral BID    levothyroxine  50 mcg Oral Before breakfast    minoxidil  2.5 mg Oral BID     Continuous Infusions:   dextrose 5 % and 0.45 % NaCl 50 mL/hr at 18 1929    pantoprazole 40 mg in dextrose 5 % 100 mL infusion (ready to mix system) 8 mg/hr (18 0503)     PRN Meds:sodium chloride, acetaminophen, dextrose 50%, dextrose 50%, glucagon (human recombinant), glucose, glucose, hydrALAZINE, insulin aspart U-100, ramelteon, sodium chloride 0.9%    Review of patient's allergies indicates:  No Known Allergies    Objectives:     Vitals(Most Recent)      BP  Min: 121/58  Max: 188/79  Temp  Av.4 °F (36.9 °C)  Min: 98 °F (36.7 °C)  Max: 98.7 °F (37.1 °C)  Pulse  Av.1  Min: 66  Max: 92  Resp  Av  Min: 11  Max: 57  SpO2  Av.5 %  Min: 96 %  Max: 100 %  Height  Av' 6" (167.6 cm)  Min: 5' 6" (167.6 cm)  Max: 5' 6" (167.6 cm)  Weight  Av.5 kg (168 lb 10.9 oz)  Min: 70.4 kg (155 lb 3.3 oz)  Max: 80.7 kg (178 lb)             Vitals(Hdhy32w)  Temp:  [98 °F (36.7 °C)-98.7 °F (37.1 °C)]   Pulse:  [66-92]   Resp:  [11-57]   BP: (121-188)/(58-93)   SpO2:  [96 %-100 %]     I & O(Tzpy19h)    Intake/Output Summary (Last 24 hours) at 2018 0754  Last data filed at 2018 0600  Gross per 24 hour   Intake 736.16 ml   Output --   Net 736.16 ml     General: AAOx3. NAD.  HEENT: NCAT. PERRLA.  Neck: Supple.   CV: RRR, no murmur   Chest: CTAB  Abd: +BS x 4. Soft. Distended, non- tender   Ext: No edema   Skin: Intact. No rash. No lesions.   Neuro: CN II-XII intact. No focal deficit. Strength 5/5 " throughout. Sensation intact.   Psych: Good judgement and reason. No A/V hallucinations. NL affect. No abnormal behaviors noted    LABS  CBC  Recent Labs   Lab  08/13/18 2000 08/14/18   0158 08/14/18   0517   WBC  4.21   --   4.70   RBC  2.73*   --   2.77*   HGB  7.5*  7.6*  7.7*  7.4*   HCT  22.2*  22.3*  22.6*  21.7*   PLT  91*   --   90*   MCV  81*   --   82   MCH  27.5   --   27.8   MCHC  33.8   --   34.1     BMP  Recent Labs   Lab  08/13/18 2000 08/14/18   0517   NA  139  137   K  4.4  4.4   CO2  28  25   CL  101  101   BUN  98*  103*   CREATININE  11.0*  11.8*   GLU  108  115*       POCT-Glucose  POCT Glucose   Date Value Ref Range Status   08/13/2018 123 (H) 70 - 110 mg/dL Final       Recent Labs   Lab  08/13/18 2000 08/14/18   0517   CALCIUM  8.7  8.8   MG  1.9  2.0   PHOS  3.7  4.3     LFT  Recent Labs   Lab  08/13/18 2000 08/14/18   0517   PROT  5.8*  5.6*   ALBUMIN  3.0*  2.9*   BILITOT  0.5  0.5   AST  16  18   ALKPHOS  35*  34*   ALT  13  13     LAST HbA1c  Lab Results   Component Value Date    HGBA1C 6.2 (H) 08/13/2018       Imaging- no new imaging     Micro- no new micro labs obtained.      Assessment/Plan:   Patient is a 68 y.o. AA male with hx of ESRD (HD MTTF), HTN, CAD s/p stenting (on 8/6/18RCA on brillinta) and previous GI bleeds 2/2 small bowel  Transferred from OSH for GI work up and evaluation for GI bleed with plans for double bubble study +/- capsule today.    Neuro  - AAOx3  - no focal deficits  - CAM ICU negative    CVS  - /74 this AM   - CXR- cardiomegaly, f/u with read  -EKG no significant changes    HTN  - continue coreg and amlodipine    CAD s/p stenting 6 days prior  - continue brilita and asa per GI    Resp  - 99% on RA  - CXR- cardiomegaly, f/u read     FEN/Gi  - fluids: IV fluids NS  -Electrolytes wnl  GI bleed  - likely 2/2 known history of AVM  -  At OSH, EGD per general surgery revealed gastritis, duodenitis and multiple small punctate ulcers with no active  bleeding  - NPO  - H/H stable this AM  - GI studies today    Renal  ESRD on HD MTTF  -Last DH 8/13  - nephro consulted    Heme/ID  Normocytic anemia likely multifactorial blood loss and ESRD  - H/H stable this morning  - continue to monitor     Endo  T2DM  - A1c 6.2  - diet controlled at home  -POCT glucose QID  - SSI    Code: full  Diet: npo  Dispo: f/u GI and studies today     Case discussed with staff    Katlyn Boss MD  [facility] HO2  08/14/2018

## 2018-08-14 NOTE — CONSULTS
[employer] renal fellow KELLI KEITH    consult note    Reason for Consult:     ESRD    Subjective:      History of Present Illness:  Ki Roque is a 68 y.o. AA male who  has a past medical history of Diabetes mellitus, Encounter for blood transfusion, Hemodialysis access, AV graft, Hypertension, and Thyroid disease.. The patient presented to the [facility] on 8/13/2018 with a primary complaint of No chief complaint on file.    Pt transferred from outside hospital in [place] for persistent GI bleed; he recently had a heart stent placed and was placed on brilinta+ASA - after this he began to have increased GI bleeding.    Renal Hx  -ESRD 2/2 DM in 2016; pt is a home hemodialysis pt who dialyzes M/T/Th/F x 2.5 hrs; target weight 77-78kg; pt makes less than a cup of urine/day; access proximal LUE AVG  -outpt nephrologist Dr. Manriquez in [place]    ROS  -pt endorses chills/abd bloating chest pain; denies sob    Past Medical History:  Past Medical History:   Diagnosis Date    Diabetes mellitus     Encounter for blood transfusion     Hemodialysis access, AV graft     Hypertension     Thyroid disease        Past Surgical History:  Past Surgical History:   Procedure Laterality Date    graft placement          Allergies:  Review of patient's allergies indicates:  No Known Allergies    Medications:   In-Hospital Scheduled Medications:   amLODIPine  10 mg Oral Daily    aspirin  81 mg Oral Daily    carvedilol  3.125 mg Oral BID    levothyroxine  50 mcg Oral Before breakfast    minoxidil  2.5 mg Oral BID      In-Hospital PRN Medications:  sodium chloride, acetaminophen, dextrose 50%, dextrose 50%, glucagon (human recombinant), glucose, glucose, hydrALAZINE, insulin aspart U-100, ramelteon, sodium chloride 0.9%   In-Hospital IV Infusion Medications:   dextrose 5 % and 0.45 % NaCl 50 mL/hr at 08/13/18 1929    pantoprazole 40 mg in dextrose 5 % 100 mL infusion (ready to mix system) 8 mg/hr (08/14/18 3384)      Home Medications:  Prior  to Admission medications    Medication Sig Start Date End Date Taking? Authorizing Provider   amLODIPine (NORVASC) 10 MG tablet Take 10 mg by mouth once daily.   Yes Historical Provider, MD   atorvastatin (LIPITOR) 80 MG tablet Take 80 mg by mouth every evening.   Yes Historical Provider, MD   calcitRIOL (ROCALTROL) 0.5 MCG Cap Take 0.5 mcg by mouth once daily.   Yes Historical Provider, MD   carvedilol (COREG) 3.125 MG tablet Take 3.125 mg by mouth 2 (two) times daily.   Yes Historical Provider, MD   cinacalcet (SENSIPAR) 30 MG Tab Take 60 mg by mouth daily with breakfast.    Yes Historical Provider, MD   doxazosin (CARDURA) 2 MG tablet Take 2 mg by mouth every evening.   Yes Historical Provider, MD   ferric citrate (AURYXIA) 210 mg iron Tab Take 210 mg by mouth 3 (three) times daily.   Yes Historical Provider, MD   FOLIC ACID/VIT B COMPLEX AND C (ULIS-AMANDEEP ORAL) Take 1 tablet by mouth once daily.   Yes Historical Provider, MD   furosemide (LASIX) 80 MG tablet Take 120 mg by mouth 2 (two) times daily.    Yes Historical Provider, MD   minoxidil (LONITEN) 2.5 MG tablet Take 2.5 mg by mouth 2 (two) times daily.    Yes Historical Provider, MD   pantoprazole (PROTONIX) 40 MG tablet Take 40 mg by mouth 2 (two) times daily.    Yes Historical Provider, MD   sodium bicarbonate 650 MG tablet Take 650 mg by mouth 2 (two) times daily.   Yes Historical Provider, MD   ticagrelor (BRILINTA) 90 mg tablet Take 90 mg by mouth 2 (two) times daily.   Yes Historical Provider, MD   CALCIUM ACETATE (PHOSLO ORAL) Take 2 tablets by mouth 2 (two) times daily before meals.    Historical Provider, MD   epoetin neva (EPOGEN) 20,000 unit/mL injection Inject 20,000 Units into the skin 3 (three) times daily.    Historical Provider, MD   glipiZIDE (GLUCOTROL) 5 MG tablet Take 5 mg by mouth 2 (two) times daily before meals.    Historical Provider, MD   levothyroxine (SYNTHROID) 50 MCG tablet Take 50 mcg by mouth once daily.    Historical Provider,  "MD   metOLazone (ZAROXOLYN) 5 MG tablet Take 5 mg by mouth once daily.    Historical Provider, MD   nebivolol (BYSTOLIC) 2.5 MG Tab Take 10 mg by mouth once daily.    Historical Provider, MD   ramipril (ALTACE) 10 MG capsule Take 10 mg by mouth once daily.    Historical Provider, MD   aspirin-sod bicarb-citric acid (SHASHA-SELTZER) 324 mg TbEF Take 325 mg by mouth 2 (two) times daily.  18  Historical Provider, MD       Family History:  Family History   Problem Relation Age of Onset    Cancer Father        Social History:  Social History     Tobacco Use    Smoking status: Former Smoker     Last attempt to quit: 2009     Years since quittin.6    Smokeless tobacco: Never Used   Substance Use Topics    Alcohol use: No    Drug use: No       Review of Systems:  All other systems are reviewed and are negative.    Health Maintenance:   Immunizations:   Currently on File:   There is no immunization history on file for this patient.       Objective:   Last 24 Hour Vital Signs:  BP  Min: 121/58  Max: 188/79  Temp  Av.4 °F (36.9 °C)  Min: 98 °F (36.7 °C)  Max: 98.7 °F (37.1 °C)  Pulse  Av.1  Min: 66  Max: 92  Resp  Av  Min: 11  Max: 57  SpO2  Av.5 %  Min: 96 %  Max: 100 %  Height  Av' 6" (167.6 cm)  Min: 5' 6" (167.6 cm)  Max: 5' 6" (167.6 cm)  Weight  Av.5 kg (168 lb 10.9 oz)  Min: 70.4 kg (155 lb 3.3 oz)  Max: 80.7 kg (178 lb)  I/O last 3 completed shifts:  In: 736.2 [I.V.:736.2]  Out: -     Physical Examination:  GEN - NAD, AAOx4  HEAD - NCAT   EYES - PERRLA, non icteric   NECK - JVP 6-7cm H2O; no thyromegaly   CHEST - lungs CTA bilaterally; equal expansion   HEART - RRR, no r/s3/s4; + 3/6 midsystolic murmur loudest at LSB 2nd ICS  ABD - distended, non ttp; no guarding or rigidity   EXTR  -warm; no edema  Skin - no tenting; no bruising; no ecchymosis   Neuro - no asterixis or localizing deficits      Laboratory Results:    Trended Lab Data:  Recent Labs   Lab  18  " 08/14/18   0158  08/14/18   0517   WBC  4.21   --   4.70   HGB  7.5*  7.6*  7.7*  7.4*   HCT  22.2*  22.3*  22.6*  21.7*   PLT  91*   --   90*   MCV  81*   --   82   RDW  17.0*   --   16.7*   NA  139   --   137   K  4.4   --   4.4   CL  101   --   101   CO2  28   --   25   BUN  98*   --   103*   GLU  108   --   115*   PROT  5.8*   --   5.6*   ALBUMIN  3.0*   --   2.9*   BILITOT  0.5   --   0.5   AST  16   --   18   ALKPHOS  35*   --   34*   ALT  13   --   13       Trended Cardiac Data:  No results for input(s): TROPONINI, CKTOTAL, CKMB, BNP in the last 168 hours.             Assessment/Plan       This is a 69 yo AA male who was transferred from outside hospital for persistent GI bleeding; L-renal consulted for    ESRD  -ESRD 2/2 DM in 2016; pt is a home hemodialysis pt who dialyzes M/T/Th/F x 2.5 hrs; target weight 77-78kg; pt makes less than a cup of urine/day; access proximal LUE AVG  -outpt nephrologist Dr. Manriquez in [place]  -will cont outpt HD schedule while in house  -cont home metolazone 5 mg + lasix 120mg BID; cont home phoslo 1334mg with meals when reshma po  -will order nephrocaps    Normocytic anemia  -will give epo with HD prn  -will order iron studies and give IV iron replacement prn    MBD  -cont oral calcitriol    Malnutrition  -will order nepro with meals    Bubba Guallpa II  [employer] renal HO V  [phone]

## 2018-08-15 ENCOUNTER — ANESTHESIA (OUTPATIENT)
Dept: ENDOSCOPY | Facility: HOSPITAL | Age: 68
DRG: 377 | End: 2018-08-15
Payer: MEDICARE

## 2018-08-15 ENCOUNTER — ANESTHESIA EVENT (OUTPATIENT)
Dept: ENDOSCOPY | Facility: HOSPITAL | Age: 68
DRG: 377 | End: 2018-08-15
Payer: MEDICARE

## 2018-08-15 VITALS — SYSTOLIC BLOOD PRESSURE: 164 MMHG | OXYGEN SATURATION: 100 % | DIASTOLIC BLOOD PRESSURE: 72 MMHG | HEART RATE: 92 BPM

## 2018-08-15 LAB
ALBUMIN SERPL BCP-MCNC: 3 G/DL
ALP SERPL-CCNC: 32 U/L
ALT SERPL W/O P-5'-P-CCNC: 9 U/L
ANION GAP SERPL CALC-SCNC: 9 MMOL/L
AST SERPL-CCNC: 17 U/L
BASOPHILS # BLD AUTO: 0.08 K/UL
BASOPHILS NFR BLD: 1.5 %
BILIRUB SERPL-MCNC: 0.4 MG/DL
BLD PROD TYP BPU: NORMAL
BLD PROD TYP BPU: NORMAL
BLOOD UNIT EXPIRATION DATE: NORMAL
BLOOD UNIT EXPIRATION DATE: NORMAL
BLOOD UNIT TYPE CODE: 6200
BLOOD UNIT TYPE CODE: 6200
BLOOD UNIT TYPE: NORMAL
BLOOD UNIT TYPE: NORMAL
BUN SERPL-MCNC: 86 MG/DL
CALCIUM SERPL-MCNC: 9 MG/DL
CHLORIDE SERPL-SCNC: 100 MMOL/L
CO2 SERPL-SCNC: 25 MMOL/L
CODING SYSTEM: NORMAL
CODING SYSTEM: NORMAL
CREAT SERPL-MCNC: 11.3 MG/DL
DIFFERENTIAL METHOD: ABNORMAL
DISPENSE STATUS: NORMAL
DISPENSE STATUS: NORMAL
EOSINOPHIL # BLD AUTO: 0.3 K/UL
EOSINOPHIL NFR BLD: 5.5 %
ERYTHROCYTE [DISTWIDTH] IN BLOOD BY AUTOMATED COUNT: 16.8 %
EST. GFR  (AFRICAN AMERICAN): 5 ML/MIN/1.73 M^2
EST. GFR  (NON AFRICAN AMERICAN): 4 ML/MIN/1.73 M^2
GLUCOSE SERPL-MCNC: 114 MG/DL
HBV SURFACE AG SERPL QL IA: NEGATIVE
HCT VFR BLD AUTO: 20.7 %
HCT VFR BLD AUTO: 20.9 %
HCT VFR BLD AUTO: 22.4 %
HCT VFR BLD AUTO: 26.4 %
HCT VFR BLD AUTO: 27.4 %
HGB BLD-MCNC: 6.9 G/DL
HGB BLD-MCNC: 7.1 G/DL
HGB BLD-MCNC: 7.6 G/DL
HGB BLD-MCNC: 8.8 G/DL
HGB BLD-MCNC: 9.3 G/DL
LYMPHOCYTES # BLD AUTO: 0.5 K/UL
LYMPHOCYTES NFR BLD: 8.9 %
MAGNESIUM SERPL-MCNC: 2 MG/DL
MCH RBC QN AUTO: 28.3 PG
MCHC RBC AUTO-ENTMCNC: 34.3 G/DL
MCV RBC AUTO: 83 FL
MONOCYTES # BLD AUTO: 0.8 K/UL
MONOCYTES NFR BLD: 14.6 %
NEUTROPHILS # BLD AUTO: 3.7 K/UL
NEUTROPHILS NFR BLD: 69.5 %
PHOSPHATE SERPL-MCNC: 5 MG/DL
PLATELET # BLD AUTO: 127 K/UL
PMV BLD AUTO: 10.9 FL
POCT GLUCOSE: 113 MG/DL (ref 70–110)
POCT GLUCOSE: 118 MG/DL (ref 70–110)
POCT GLUCOSE: 120 MG/DL (ref 70–110)
POTASSIUM SERPL-SCNC: 4.4 MMOL/L
PROT SERPL-MCNC: 5.5 G/DL
RBC # BLD AUTO: 2.51 M/UL
SODIUM SERPL-SCNC: 134 MMOL/L
TRANS ERYTHROCYTES VOL PATIENT: NORMAL ML
TRANS ERYTHROCYTES VOL PATIENT: NORMAL ML
WBC # BLD AUTO: 5.27 K/UL

## 2018-08-15 PROCEDURE — 85014 HEMATOCRIT: CPT | Mod: 91

## 2018-08-15 PROCEDURE — 63600175 PHARM REV CODE 636 W HCPCS: Performed by: NURSE ANESTHETIST, CERTIFIED REGISTERED

## 2018-08-15 PROCEDURE — 25000003 PHARM REV CODE 250: Performed by: INTERNAL MEDICINE

## 2018-08-15 PROCEDURE — 0W3P8ZZ CONTROL BLEEDING IN GASTROINTESTINAL TRACT, VIA NATURAL OR ARTIFICIAL OPENING ENDOSCOPIC: ICD-10-PCS | Performed by: INTERNAL MEDICINE

## 2018-08-15 PROCEDURE — 37000009 HC ANESTHESIA EA ADD 15 MINS: Performed by: INTERNAL MEDICINE

## 2018-08-15 PROCEDURE — 83735 ASSAY OF MAGNESIUM: CPT

## 2018-08-15 PROCEDURE — 25000003 PHARM REV CODE 250: Performed by: STUDENT IN AN ORGANIZED HEALTH CARE EDUCATION/TRAINING PROGRAM

## 2018-08-15 PROCEDURE — 44378 SMALL BOWEL ENDOSCOPY: CPT | Performed by: INTERNAL MEDICINE

## 2018-08-15 PROCEDURE — 27202087 HC PROBE, APC: Performed by: INTERNAL MEDICINE

## 2018-08-15 PROCEDURE — 85014 HEMATOCRIT: CPT

## 2018-08-15 PROCEDURE — 63600175 PHARM REV CODE 636 W HCPCS: Performed by: STUDENT IN AN ORGANIZED HEALTH CARE EDUCATION/TRAINING PROGRAM

## 2018-08-15 PROCEDURE — 25000242 PHARM REV CODE 250 ALT 637 W/ HCPCS: Performed by: STUDENT IN AN ORGANIZED HEALTH CARE EDUCATION/TRAINING PROGRAM

## 2018-08-15 PROCEDURE — 85018 HEMOGLOBIN: CPT

## 2018-08-15 PROCEDURE — 85018 HEMOGLOBIN: CPT | Mod: 91

## 2018-08-15 PROCEDURE — 36415 COLL VENOUS BLD VENIPUNCTURE: CPT

## 2018-08-15 PROCEDURE — 25000003 PHARM REV CODE 250: Performed by: NURSE ANESTHETIST, CERTIFIED REGISTERED

## 2018-08-15 PROCEDURE — 37000008 HC ANESTHESIA 1ST 15 MINUTES: Performed by: INTERNAL MEDICINE

## 2018-08-15 PROCEDURE — C9113 INJ PANTOPRAZOLE SODIUM, VIA: HCPCS | Performed by: STUDENT IN AN ORGANIZED HEALTH CARE EDUCATION/TRAINING PROGRAM

## 2018-08-15 PROCEDURE — 36430 TRANSFUSION BLD/BLD COMPNT: CPT

## 2018-08-15 PROCEDURE — 11000001 HC ACUTE MED/SURG PRIVATE ROOM

## 2018-08-15 PROCEDURE — 27201030 HC OVERTUBE: Performed by: INTERNAL MEDICINE

## 2018-08-15 PROCEDURE — 84100 ASSAY OF PHOSPHORUS: CPT

## 2018-08-15 PROCEDURE — 80053 COMPREHEN METABOLIC PANEL: CPT

## 2018-08-15 PROCEDURE — P9021 RED BLOOD CELLS UNIT: HCPCS

## 2018-08-15 PROCEDURE — 94761 N-INVAS EAR/PLS OXIMETRY MLT: CPT

## 2018-08-15 PROCEDURE — 85025 COMPLETE CBC W/AUTO DIFF WBC: CPT

## 2018-08-15 RX ORDER — ACETAMINOPHEN 325 MG/1
650 TABLET ORAL EVERY 6 HOURS PRN
Status: DISCONTINUED | OUTPATIENT
Start: 2018-08-15 | End: 2018-08-19 | Stop reason: HOSPADM

## 2018-08-15 RX ORDER — ONDANSETRON 2 MG/ML
4 INJECTION INTRAMUSCULAR; INTRAVENOUS ONCE
Status: DISCONTINUED | OUTPATIENT
Start: 2018-08-15 | End: 2018-08-17

## 2018-08-15 RX ORDER — FLUTICASONE PROPIONATE 50 MCG
2 SPRAY, SUSPENSION (ML) NASAL 2 TIMES DAILY
Status: DISCONTINUED | OUTPATIENT
Start: 2018-08-15 | End: 2018-08-19 | Stop reason: HOSPADM

## 2018-08-15 RX ORDER — ETOMIDATE 2 MG/ML
INJECTION INTRAVENOUS
Status: DISCONTINUED | OUTPATIENT
Start: 2018-08-15 | End: 2018-08-15

## 2018-08-15 RX ORDER — SUCCINYLCHOLINE CHLORIDE 20 MG/ML
INJECTION INTRAMUSCULAR; INTRAVENOUS
Status: DISCONTINUED | OUTPATIENT
Start: 2018-08-15 | End: 2018-08-15

## 2018-08-15 RX ORDER — SENNOSIDES 8.6 MG/1
8.6 TABLET ORAL DAILY PRN
Status: DISCONTINUED | OUTPATIENT
Start: 2018-08-15 | End: 2018-08-17

## 2018-08-15 RX ORDER — PROMETHAZINE HYDROCHLORIDE 12.5 MG/1
12.5 TABLET ORAL ONCE
Status: COMPLETED | OUTPATIENT
Start: 2018-08-15 | End: 2018-08-15

## 2018-08-15 RX ORDER — FENTANYL CITRATE 50 UG/ML
INJECTION, SOLUTION INTRAMUSCULAR; INTRAVENOUS
Status: DISCONTINUED | OUTPATIENT
Start: 2018-08-15 | End: 2018-08-15

## 2018-08-15 RX ORDER — ROCURONIUM BROMIDE 10 MG/ML
INJECTION, SOLUTION INTRAVENOUS
Status: DISCONTINUED | OUTPATIENT
Start: 2018-08-15 | End: 2018-08-15

## 2018-08-15 RX ORDER — FLUTICASONE PROPIONATE 50 MCG
2 SPRAY, SUSPENSION (ML) NASAL 2 TIMES DAILY PRN
Status: DISCONTINUED | OUTPATIENT
Start: 2018-08-15 | End: 2018-08-19 | Stop reason: HOSPADM

## 2018-08-15 RX ORDER — TRAMADOL HYDROCHLORIDE 50 MG/1
50 TABLET ORAL EVERY 8 HOURS PRN
Status: DISCONTINUED | OUTPATIENT
Start: 2018-08-15 | End: 2018-08-19 | Stop reason: HOSPADM

## 2018-08-15 RX ORDER — GUAIFENESIN 600 MG/1
600 TABLET, EXTENDED RELEASE ORAL 2 TIMES DAILY
Status: DISCONTINUED | OUTPATIENT
Start: 2018-08-15 | End: 2018-08-19 | Stop reason: HOSPADM

## 2018-08-15 RX ORDER — HYDROCODONE BITARTRATE AND ACETAMINOPHEN 500; 5 MG/1; MG/1
TABLET ORAL
Status: DISCONTINUED | OUTPATIENT
Start: 2018-08-15 | End: 2018-08-19 | Stop reason: HOSPADM

## 2018-08-15 RX ORDER — ONDANSETRON HYDROCHLORIDE 2 MG/ML
INJECTION, SOLUTION INTRAMUSCULAR; INTRAVENOUS
Status: DISCONTINUED | OUTPATIENT
Start: 2018-08-15 | End: 2018-08-15

## 2018-08-15 RX ORDER — SODIUM CHLORIDE, SODIUM LACTATE, POTASSIUM CHLORIDE, CALCIUM CHLORIDE 600; 310; 30; 20 MG/100ML; MG/100ML; MG/100ML; MG/100ML
INJECTION, SOLUTION INTRAVENOUS CONTINUOUS PRN
Status: DISCONTINUED | OUTPATIENT
Start: 2018-08-15 | End: 2018-08-15

## 2018-08-15 RX ORDER — LIDOCAINE HCL/PF 100 MG/5ML
SYRINGE (ML) INTRAVENOUS
Status: DISCONTINUED | OUTPATIENT
Start: 2018-08-15 | End: 2018-08-15

## 2018-08-15 RX ORDER — LIDOCAINE 50 MG/G
1 PATCH TOPICAL
Status: DISCONTINUED | OUTPATIENT
Start: 2018-08-15 | End: 2018-08-19 | Stop reason: HOSPADM

## 2018-08-15 RX ADMIN — DEXTROSE 8 MG/HR: 50 INJECTION, SOLUTION INTRAVENOUS at 07:08

## 2018-08-15 RX ADMIN — AMLODIPINE BESYLATE 10 MG: 5 TABLET ORAL at 09:08

## 2018-08-15 RX ADMIN — FENTANYL CITRATE 50 MCG: 50 INJECTION, SOLUTION INTRAMUSCULAR; INTRAVENOUS at 03:08

## 2018-08-15 RX ADMIN — TRAMADOL HYDROCHLORIDE 50 MG: 50 TABLET, COATED ORAL at 10:08

## 2018-08-15 RX ADMIN — LIDOCAINE 1 PATCH: 50 PATCH TOPICAL at 10:08

## 2018-08-15 RX ADMIN — Medication 1 CAPSULE: at 09:08

## 2018-08-15 RX ADMIN — DEXTROSE 8 MG/HR: 50 INJECTION, SOLUTION INTRAVENOUS at 06:08

## 2018-08-15 RX ADMIN — CALCITRIOL 0.25 MCG: 0.25 CAPSULE, LIQUID FILLED ORAL at 09:08

## 2018-08-15 RX ADMIN — DEXTROSE 8 MG/HR: 50 INJECTION, SOLUTION INTRAVENOUS at 12:08

## 2018-08-15 RX ADMIN — SENNOSIDES 8.6 MG: 8.6 TABLET, FILM COATED ORAL at 10:08

## 2018-08-15 RX ADMIN — FUROSEMIDE 120 MG: 40 TABLET ORAL at 09:08

## 2018-08-15 RX ADMIN — DEXTROSE 8 MG/HR: 50 INJECTION, SOLUTION INTRAVENOUS at 01:08

## 2018-08-15 RX ADMIN — CARVEDILOL 3.12 MG: 3.12 TABLET, FILM COATED ORAL at 09:08

## 2018-08-15 RX ADMIN — FLUTICASONE PROPIONATE 100 MCG: 50 SPRAY, METERED NASAL at 03:08

## 2018-08-15 RX ADMIN — ACETAMINOPHEN 650 MG: 325 TABLET ORAL at 12:08

## 2018-08-15 RX ADMIN — LIDOCAINE HYDROCHLORIDE 100 MG: 20 INJECTION, SOLUTION INTRAVENOUS at 03:08

## 2018-08-15 RX ADMIN — TICAGRELOR 180 MG: 90 TABLET ORAL at 05:08

## 2018-08-15 RX ADMIN — SODIUM CHLORIDE, SODIUM LACTATE, POTASSIUM CHLORIDE, AND CALCIUM CHLORIDE: .6; .31; .03; .02 INJECTION, SOLUTION INTRAVENOUS at 03:08

## 2018-08-15 RX ADMIN — CALCIUM ACETATE 1334 MG: 667 CAPSULE ORAL at 07:08

## 2018-08-15 RX ADMIN — HYDRALAZINE HYDROCHLORIDE 10 MG: 20 INJECTION INTRAMUSCULAR; INTRAVENOUS at 12:08

## 2018-08-15 RX ADMIN — ONDANSETRON 4 MG: 2 INJECTION, SOLUTION INTRAMUSCULAR; INTRAVENOUS at 04:08

## 2018-08-15 RX ADMIN — SUCCINYLCHOLINE CHLORIDE 100 MG: 20 INJECTION, SOLUTION INTRAMUSCULAR; INTRAVENOUS at 03:08

## 2018-08-15 RX ADMIN — MINOXIDIL 2.5 MG: 2.5 TABLET ORAL at 09:08

## 2018-08-15 RX ADMIN — PROMETHAZINE HYDROCHLORIDE 12.5 MG: 12.5 TABLET ORAL at 03:08

## 2018-08-15 RX ADMIN — METOLAZONE 5 MG: 5 TABLET ORAL at 09:08

## 2018-08-15 RX ADMIN — LEVOTHYROXINE SODIUM 50 MCG: 50 TABLET ORAL at 06:08

## 2018-08-15 RX ADMIN — ACETAMINOPHEN 650 MG: 325 TABLET ORAL at 07:08

## 2018-08-15 RX ADMIN — FENTANYL CITRATE 25 MCG: 50 INJECTION, SOLUTION INTRAMUSCULAR; INTRAVENOUS at 03:08

## 2018-08-15 RX ADMIN — GUAIFENESIN 600 MG: 600 TABLET, EXTENDED RELEASE ORAL at 09:08

## 2018-08-15 RX ADMIN — SODIUM CHLORIDE: 0.9 INJECTION, SOLUTION INTRAVENOUS at 09:08

## 2018-08-15 RX ADMIN — ROCURONIUM BROMIDE 5 MG: 10 INJECTION, SOLUTION INTRAVENOUS at 03:08

## 2018-08-15 RX ADMIN — ASPIRIN 81 MG 81 MG: 81 TABLET ORAL at 09:08

## 2018-08-15 RX ADMIN — FLUTICASONE PROPIONATE 100 MCG: 50 SPRAY, METERED NASAL at 09:08

## 2018-08-15 RX ADMIN — ETOMIDATE 12 MG: 2 INJECTION, SOLUTION INTRAVENOUS at 03:08

## 2018-08-15 NOTE — TRANSFER OF CARE
"Anesthesia Transfer of Care Note    Patient: Ki Roque    Procedure(s) Performed: Procedure(s) (LRB):  ENTEROSCOPY, PROXIMAL (N/A)    Patient location: PACU    Anesthesia Type: general    Transport from OR: Transported from OR on 6-10 L/min O2 by face mask with adequate spontaneous ventilation    Post pain: adequate analgesia    Post assessment: no apparent anesthetic complications    Post vital signs: stable    Level of consciousness: awake and alert    Nausea/Vomiting: no nausea/vomiting    Complications: none    Transfer of care protocol was followed      Last vitals:   Visit Vitals  BP (!) 173/72   Pulse 72   Temp 36.7 °C (98.1 °F) (Oral)   Resp (!) 24   Ht 5' 6" (1.676 m)   Wt 78.4 kg (172 lb 13.5 oz)   SpO2 100%   BMI 27.90 kg/m²     "

## 2018-08-15 NOTE — PROVATION PATIENT INSTRUCTIONS
Discharge Summary/Instructions after an Endoscopic Procedure  Patient Name: Ki Knight  Patient MRN: [id number]  Patient YOB: 1950  Wednesday, August 15, 2018  Moustapha Guillen MD  RESTRICTIONS:  During your procedure today, you received medications for sedation.  These   medications may affect your judgment, balance and coordination.  Therefore,   for 24 hours, you have the following restrictions:   - DO NOT drive a car, operate machinery, make legal/financial decisions,   sign important papers or drink alcohol.    ACTIVITY:  Today: no heavy lifting, straining or running due to procedural   sedation/anesthesia.  The following day: return to full activity including work.  DIET:  Eat and drink normally unless instructed otherwise.     TREATMENT FOR COMMON SIDE EFFECTS:  - Mild abdominal pain, nausea, belching, bloating or excessive gas:  rest,   eat lightly and use a heating pad.  - Sore Throat: treat with throat lozenges and/or gargle with warm salt   water.  - Because air was used during the procedure, expelling large amounts of air   from your rectum or belching is normal.  - If a bowel prep was taken, you may not have a bowel movement for 1-3 days.    This is normal.  SYMPTOMS TO WATCH FOR AND REPORT TO YOUR PHYSICIAN:  1. Abdominal pain or bloating, other than gas cramps.  2. Chest pain.  3. Back pain.  4. Signs of infection such as: chills or fever occurring within 24 hours   after the procedure.  5. Rectal bleeding, which would show as bright red, maroon, or black stools.   (A tablespoon of blood from the rectum is not serious, especially if   hemorrhoids are present.)  6. Vomiting.  7. Weakness or dizziness.  GO DIRECTLY TO THE NEAREST EMERGENCY ROOM IF YOU HAVE ANY OF THE FOLLOWING:      Difficulty breathing              Chills and/or fever over 101 F   Persistent vomiting and/or vomiting blood   Severe abdominal pain   Severe chest pain   Black, tarry stools   Bleeding- more than one  tablespoon   Any other symptom or condition that you feel may need urgent attention  Your doctor recommends these additional instructions:  If any biopsies were taken, your doctors clinic will contact you in 1 to 2   weeks with any results.  Restart DAPT now   Regular diet then NPO after midnight   Plan repeat upper enteroscopy tomorrow afternoon then discharge to home   tomorrow evening   Iron, procrit and sandostatin as outpatient  For questions, problems or results please call your physician - Moustapha Guillne MD at Work:  [phone].  EMERGENCY PHONE NUMBER: [phone],  LAB RESULTS: [phone]  IF A COMPLICATION OR EMERGENCY SITUATION ARISES AND YOU ARE UNABLE TO REACH   YOUR PHYSICIAN - GO DIRECTLY TO THE EMERGENCY ROOM.  MD Moustapha Prabhakar MD  8/15/2018 4:17:36 PM  This report has been verified and signed electronically.  PROVATION

## 2018-08-15 NOTE — ANESTHESIA PREPROCEDURE EVALUATION
08/14/2018  Ki Roque is a 68 y.o., male w GI angiodysplasia & GI blood loss anemia; admitted to ICU; for prox enteroscopy under MAC/GA.    PRIOR ANES (in Epic) [id number] SmBowelEnteroscopy GA    [fent 50, prop 100] SB P 200->150    [sevo, prop 100, fent 20] VSS NAAC    [easy mask; ETT 7.5, Manriquez#2, Grade I]  [id number] EGD&PushEnteroscopy MAC    fent 25X4, prop 50 -> 100 mcg/kg/min    NAAC    ANES-RELATED MED/SURG  Patient Active Problem List   Diagnosis    Blood loss anemia    Angiodysplasia    Iron deficiency anemia due to chronic blood loss    GI bleed    CAD (coronary artery disease)    ESRD (end stage renal disease)     Past Medical History:   Diagnosis Date    Diabetes mellitus     Encounter for blood transfusion     Hemodialysis access, AV graft     Hypertension     Thyroid disease      Past Surgical History:   Procedure Laterality Date    graft placement        ALLERGIES  Review of patient's allergies indicates:  No Known Allergies    ANES-RELATED MAR [id number]  amlodipine  levothyroxine  carvedilol  insulin-SC/SS  minoxidil-PO  furosemide-PO 120bid pantoprazole  ASA-81      Pre-op Assessment      I have reviewed the Medications.     Review of Systems  Anesthesia Hx:  No problems with previous Anesthesia Denies Hx of Anesthetic complications   Denies Personal Hx of Anesthesia complications.   Hematology/Oncology:         -- Anemia:   Cardiovascular:   Hypertension    Renal/:   Chronic Renal Disease, ESRD, Dialysis    Endocrine:   Diabetes      Wt Readings from Last 1 Encounters:   08/14/18 78.4 kg (172 lb 13.5 oz)     Temp Readings from Last 1 Encounters:   08/14/18 36.8 °C (98.3 °F) (Oral)     BP Readings from Last 1 Encounters:   08/14/18 (!) 160/70     Pulse Readings from Last 1 Encounters:   08/14/18 84     SpO2 Readings from Last 1 Encounters:   08/14/18 98%        Physical Exam  General:  Well nourished    Airway/Jaw/Neck:  Airway Findings:      Chest/Lungs:  Chest/Lungs Findings:    Heart/Vascular:  Heart Findings:       Mental Status:  Mental Status Findings:       Lab Results   Component Value Date    WBC 4.70 08/14/2018    HGB 7.4 (L) 08/14/2018    HCT 21.6 (L) 08/14/2018    MCV 82 08/14/2018    PLT 90 (L) 08/14/2018       Chemistry        Component Value Date/Time     08/14/2018 0517    K 4.4 08/14/2018 0517     08/14/2018 0517    CO2 25 08/14/2018 0517     (H) 08/14/2018 0517    CREATININE 11.8 (H) 08/14/2018 0517     (H) 08/14/2018 0517        Component Value Date/Time    CALCIUM 8.8 08/14/2018 0517    ALKPHOS 34 (L) 08/14/2018 0517    AST 18 08/14/2018 0517    ALT 13 08/14/2018 0517    BILITOT 0.5 08/14/2018 0517    ESTGFRAFRICA 4 (A) 08/14/2018 0517    EGFRNONAA 4 (A) 08/14/2018 0517          Lab Results   Component Value Date    ALBUMIN 2.9 (L) 08/14/2018      Lab Results   Component Value Date    TSH 2.486 08/13/2018       CXR [id number]  Clear lungs.  Enlarged cardiac silhouette which may be in part related to AP view.    EKG [id number]  Normal sinus rhythm with sinus arrhythmia  T wave abnormality, consider lateral ischemia  Abnormal ECG  When compared with ECG of 22-JAN-2018 09:14, no significant change was found  Confirmed by Josue     ECHO        Anesthesia Plan  Type of Anesthesia, risks & benefits discussed:  Anesthesia Type:  MAC, general  Patient's Preference:   Intra-op Monitoring Plan: standard ASA monitors  Intra-op Monitoring Plan Comments:   Post Op Pain Control Plan: multimodal analgesia  Post Op Pain Control Plan Comments:   Induction:    Beta Blocker:  Patient is not currently on a Beta-Blocker (No further documentation required).       Informed Consent: Patient understands risks and agrees with Anesthesia plan.  Questions answered. Anesthesia consent signed with patient.  ASA Score: 3     Day of Surgery Review of  History & Physical:        Anesthesia Plan Notes: Labs pending        Ready For Surgery From Anesthesia Perspective.

## 2018-08-15 NOTE — PLAN OF CARE
Problem: Patient Care Overview  Goal: Plan of Care Review  Outcome: Ongoing (interventions implemented as appropriate)  Patient complained of nasal congestion, relieved with nasal spray. No BMs or signs of GIB. H/H  Drawn Q6H. Lasix 120 mg po given with no urine output. MD aware. Blood glucose monitored. NPO since 0000 for procedure scheduled. Up in chair. Instructed to call for mobility. Call light within reach. Remains free from fall/injury. Will continue to monitor.

## 2018-08-15 NOTE — PLAN OF CARE
Report received from Nicci.  Preoperative fasting confirmed appropriate for procedure and sedation. Patent IV access.

## 2018-08-15 NOTE — PLAN OF CARE
Problem: Patient Care Overview  Goal: Plan of Care Review  Outcome: Ongoing (interventions implemented as appropriate)  Pt's SpO2 100% on room air. No respiratory distress noted. Will continue to monitor SpO2.

## 2018-08-15 NOTE — ANESTHESIA POSTPROCEDURE EVALUATION
"Anesthesia Post Evaluation    Patient: Ki Roque    Procedure(s) Performed: Procedure(s) (LRB):  ENTEROSCOPY, PROXIMAL (N/A)    Final Anesthesia Type: general  Patient location during evaluation: PACU  Patient participation: Yes- Able to Participate  Level of consciousness: awake and alert, oriented and awake  Post-procedure vital signs: reviewed and stable  Pain management: adequate  Airway patency: patent  PONV status at discharge: No PONV  Anesthetic complications: no      Cardiovascular status: blood pressure returned to baseline  Respiratory status: unassisted and room air  Hydration status: euvolemic  Follow-up not needed.        Visit Vitals  BP (!) 156/67   Pulse 75   Temp 36 °C (96.8 °F)   Resp 20   Ht 5' 6" (1.676 m)   Wt 78.4 kg (172 lb 13.5 oz)   SpO2 98%   BMI 27.90 kg/m²       Pain/Megan Score: Pain Assessment Performed: Yes (8/15/2018  4:50 PM)  Presence of Pain: denies (8/15/2018  4:50 PM)  Pain Rating Prior to Med Admin: 5 (8/15/2018 12:50 PM)  Pain Rating Post Med Admin: 2 (8/15/2018  1:50 PM)  Megan Score: 10 (8/15/2018  4:50 PM)        "

## 2018-08-15 NOTE — PLAN OF CARE
Notified Dr Brannon, pt was given scheduled lasix 120 mg and did not void. Does not have the urge to void and no complaints of abdominal pain at this time. On IVF at 50 ml/hr. Pt oliguric at baseline. Will receive dialysis on 8/15.    Pt is complaining of nasal congestion and head cold.  will order med for nasal congestion.

## 2018-08-15 NOTE — PROGRESS NOTES
PGY-2 Progress Note [facility]  Follow up for: GI Bleed    Hospital Stay Day 2    Subjective: Afebrile, BP elevated, H/H of 6.9/20.9 this AM.  Patient reports having a head cold and abdominal distention.  Denies bowel movement since admission    Denies any CP, SOB, N/V/D, fever or chills.     Scheduled Meds:   amLODIPine  10 mg Oral Daily    aspirin  81 mg Oral Daily    calcitRIOL  0.25 mcg Oral Daily    calcium acetate  1,334 mg Oral TID WM    carvedilol  3.125 mg Oral BID    furosemide  120 mg Oral BID    levothyroxine  50 mcg Oral Before breakfast    metoclopramide HCl  10 mg Intravenous Once    metOLazone  5 mg Oral Daily    minoxidil  2.5 mg Oral BID    vitamin renal formula (B-complex-vitamin c-folic acid)  1 capsule Oral Daily     Continuous Infusions:   dextrose 5 % and 0.45 % NaCl 50 mL/hr at 18 1800    pantoprazole 40 mg in dextrose 5 % 100 mL infusion (ready to mix system) 8 mg/hr (08/15/18 0617)     PRN Meds:sodium chloride, sodium chloride, acetaminophen, acetaminophen, dextrose 50%, dextrose 50%, fluticasone, glucagon (human recombinant), glucose, glucose, hydrALAZINE, insulin aspart U-100, ramelteon, sodium chloride 0.9%    Review of patient's allergies indicates:  No Known Allergies    Objectives:     Vitals(Most Recent)      BP  Min: 95/53  Max: 192/80  Temp  Av.3 °F (36.8 °C)  Min: 98 °F (36.7 °C)  Max: 98.7 °F (37.1 °C)  Pulse  Av.9  Min: 61  Max: 86  Resp  Av.7  Min: 10  Max: 55  SpO2  Av.9 %  Min: 97 %  Max: 100 %             Vitals(Atji42i)  Temp:  [98 °F (36.7 °C)-98.7 °F (37.1 °C)]   Pulse:  [61-86]   Resp:  [10-55]   BP: ()/(53-80)   SpO2:  [97 %-100 %]     I & O(Bgww56x)    Intake/Output Summary (Last 24 hours) at 8/15/2018 0851  Last data filed at 8/15/2018 0600  Gross per 24 hour   Intake 2480 ml   Output 550 ml   Net 1930 ml     General: AAOx3. NAD.  HEENT: NCAT. PERRLA.  Neck: Supple.   CV: RRR, no murmur   Chest: CTAB  Abd: +BS x 4. Soft.  Distended, non- tender   Ext: trace edema  Skin: Intact. No rash. No lesions.   Neuro: CN II-XII intact. No focal deficit. Strength 5/5 throughout. Sensation intact.   Psych: Good judgement and reason. No A/V hallucinations. NL affect. No abnormal behaviors noted    LABS  CBC  Recent Labs   Lab  08/13/18 2000 08/14/18 0517 08/14/18   1128  08/15/18   0012  08/15/18   0535   WBC  4.21   --   4.70   --    --    --   5.27   RBC  2.73*   --   2.77*   --    --    --   2.51*   HGB  7.5*   < >  7.7*  7.4*   < >  7.4*  7.6*  7.1*  6.9*   HCT  22.2*   < >  22.6*  21.7*   < >  21.6*  22.4*  20.7*  20.9*   PLT  91*   --   90*   --    --    --   127*   MCV  81*   --   82   --    --    --   83   MCH  27.5   --   27.8   --    --    --   28.3   MCHC  33.8   --   34.1   --    --    --   34.3    < > = values in this interval not displayed.     BMP  Recent Labs   Lab  08/13/18   2000  08/14/18   0517  08/15/18   0535   NA  139  137  134*   K  4.4  4.4  4.4   CO2  28  25  25   CL  101  101  100   BUN  98*  103*  86*   CREATININE  11.0*  11.8*  11.3*   GLU  108  115*  114*       POCT-Glucose  POCT Glucose   Date Value Ref Range Status   08/15/2018 113 (H) 70 - 110 mg/dL Final   08/14/2018 147 (H) 70 - 110 mg/dL Final   08/14/2018 151 (H) 70 - 110 mg/dL Final   08/14/2018 113 (H) 70 - 110 mg/dL Final   08/13/2018 123 (H) 70 - 110 mg/dL Final       Recent Labs   Lab  08/13/18   2000  08/14/18   0517  08/15/18   0535   CALCIUM  8.7  8.8  9.0   MG  1.9  2.0  2.0   PHOS  3.7  4.3  5.0*     LFT  Recent Labs   Lab  08/13/18 2000 08/14/18   0517  08/15/18   0535   PROT  5.8*  5.6*  5.5*   ALBUMIN  3.0*  2.9*  3.0*   BILITOT  0.5  0.5  0.4   AST  16  18  17   ALKPHOS  35*  34*  32*   ALT  13  13  9*       LAST HbA1c  Lab Results   Component Value Date    HGBA1C 6.2 (H) 08/13/2018           Imaging- no new imaging obtained      Micro- no new micro labs obtained      Assessment/Plan:   Patient is a 68 y.o. AA male with hx of ESRD  (HD MTTF), HTN, CAD s/p stenting (on 8/6/18RCA on brillinta) and previous GI bleeds 2/2 small bowel  Transferred from OSH for GI work up and evaluation for GI bleed with plans for double bubble study +/- capsule today.     Neuro  - AAOx3  - no focal deficits  - CAM ICU negative     CVS  - /70 this AM   - CXR- clear lungs, enlarged cardiac silhouette  -EKG no significant changes     HTN  - continue coreg and amlodipine     CAD s/p stenting 7 days prior  - continue brilita and asa per GI     Resp  - 99% on RA  - CXR- clear lungs, enlarged cardiac silhouette     FEN/Gi  -Electrolytes wnl  GI bleed  - likely 2/2 known history of AVM  -  At OSH, EGD per general surgery revealed gastritis, duodenitis and multiple small punctate ulcers with no active bleeding  - NPO  - H/H 6.9/20.9 this morning.  Will transfuse 2 units of pRBC this AM  - GI studies today     Renal  ESRD on HD MTTF  - nephro consulted  - Received HD yesterday, will continue home schedule     Heme/ID  Normocytic anemia likely multifactorial blood loss and ESRD  - will transfuse 2 units pRBC today  - continue to monitor      Endo  T2DM  - A1c 6.2  - diet controlled at home  -POCT glucose QID  - SSI     Code: full  Diet: npo  Dispo: f/u GI and studies today      Case discussed with staff        Katlyn Boss MD  [facility] HO2  08/15/2018

## 2018-08-15 NOTE — PROGRESS NOTES
[employer] renal fellow KELLI KEITH      Subjective:      Interval Hx:  -reshma 1.5 hrs of iHD day prior and then became Sx hypotensive  -pt states his abd is bloated and he has a head cold       Objective:   Last 24 Hour Vital Signs:  BP  Min: 95/53  Max: 192/80  Temp  Av.3 °F (36.8 °C)  Min: 98 °F (36.7 °C)  Max: 98.6 °F (37 °C)  Pulse  Av.2  Min: 61  Max: 86  Resp  Av.9  Min: 10  Max: 55  SpO2  Av %  Min: 97 %  Max: 100 %  I/O last 3 completed shifts:  In: 3216.2 [P.O.:200; I.V.:2416.2; Blood:100; Other:500]  Out: 550 [Urine:50; Other:500]    Physical Examination:  GEN - NAD, AAOx4  HEAD - NCAT   EYES - PERRLA, non icteric   CHEST - lungs CTA bilaterally; equal expansion   HEART - RRR, no r/s3/s4; + 3/6 midsystolic murmur loudest at LSB 2nd ICS  ABD - distended, non ttp; no guarding or rigidity; + BS   EXTR  - warm; no edema  Skin - no tenting; no bruising; no ecchymosis   Neuro - no asterixis or localizing deficits        Laboratory:  Laboratory   Pertinent Findings:  Recent Labs   Lab  18   0517   18   1128  08/15/18   0012  08/15/18   0535   WBC  4.21   --   4.70   --    --    --   5.27   HGB  7.5*   < >  7.7*  7.4*   < >  7.4*  7.6*  7.1*  6.9*   HCT  22.2*   < >  22.6*  21.7*   < >  21.6*  22.4*  20.7*  20.9*   PLT  91*   --   90*   --    --    --   127*   MCV  81*   --   82   --    --    --   83   RDW  17.0*   --   16.7*   --    --    --   16.8*   NA  139   --   137   --    --    --   134*   K  4.4   --   4.4   --    --    --   4.4   CL  101   --   101   --    --    --   100   CO2  28   --   25   --    --    --   25   BUN  98*   --   103*   --    --    --   86*   GLU  108   --   115*   --    --    --   114*   PROT  5.8*   --   5.6*   --    --    --   5.5*   ALBUMIN  3.0*   --   2.9*   --    --    --   3.0*   BILITOT  0.5   --   0.5   --    --    --   0.4   AST  16   --   18   --    --    --   17   ALKPHOS  35*   --   34*   --    --    --   32*   ALT  13   --   13   --     --    --   9*    < > = values in this interval not displayed.       Current Medications:     Infusions:   dextrose 5 % and 0.45 % NaCl 50 mL/hr at 08/14/18 1800    pantoprazole 40 mg in dextrose 5 % 100 mL infusion (ready to mix system) 8 mg/hr (08/15/18 0617)        Scheduled:   amLODIPine  10 mg Oral Daily    aspirin  81 mg Oral Daily    calcitRIOL  0.25 mcg Oral Daily    calcium acetate  1,334 mg Oral TID WM    carvedilol  3.125 mg Oral BID    furosemide  120 mg Oral BID    levothyroxine  50 mcg Oral Before breakfast    metoclopramide HCl  10 mg Intravenous Once    metOLazone  5 mg Oral Daily    minoxidil  2.5 mg Oral BID    vitamin renal formula (B-complex-vitamin c-folic acid)  1 capsule Oral Daily        PRN:  sodium chloride, sodium chloride, sodium chloride, sodium chloride 0.9%, acetaminophen, acetaminophen, dextrose 50%, dextrose 50%, fluticasone, glucagon (human recombinant), glucose, glucose, hydrALAZINE, insulin aspart U-100, ramelteon, sodium chloride 0.9%        Assessment/Plan     This is a 69 yo AA male who was transferred from outside hospital for persistent GI bleeding; L-renal consulted for     ESRD  -ESRD 2/2 DM in 2016; pt is a home hemodialysis pt who dialyzes M/T/Th/F x 2.5 hrs; target weight 77-78kg; pt makes less than a cup of urine/day; access proximal LUE AVG  -outpt nephrologist Dr. Manriquez in [place]  -will cont outpt HD schedule while in house  -cont home metolazone 5 mg + lasix 120mg BID; cont home phoslo 1334mg with meals when reshma po  -will order nephrocaps     Normocytic anemia  -will give epo/IV iron with HD prn     MBD  -cont oral calcitriol     Malnutrition  -will order nepro with meals    GI bleed  -H/H stable  -pill study day prior; potential DBE today       Bubba Guallpa II  [facility]  [phone]

## 2018-08-15 NOTE — PROVATION PATIENT INSTRUCTIONS
Discharge Summary/Instructions after an Endoscopic Procedure  Patient Name: Ki Knight  Patient MRN: [id number]  Patient YOB: 1950 Tuesday, August 14, 2018  Moustapha Guillen MD  RESTRICTIONS:  During your procedure today, you received medications for sedation.  These   medications may affect your judgment, balance and coordination.  Therefore,   for 24 hours, you have the following restrictions:   - DO NOT drive a car, operate machinery, make legal/financial decisions,   sign important papers or drink alcohol.    ACTIVITY:  Today: no heavy lifting, straining or running due to procedural   sedation/anesthesia.  The following day: return to full activity including work.  DIET:  Eat and drink normally unless instructed otherwise.     TREATMENT FOR COMMON SIDE EFFECTS:  - Mild abdominal pain, nausea, belching, bloating or excessive gas:  rest,   eat lightly and use a heating pad.  - Sore Throat: treat with throat lozenges and/or gargle with warm salt   water.  - Because air was used during the procedure, expelling large amounts of air   from your rectum or belching is normal.  - If a bowel prep was taken, you may not have a bowel movement for 1-3 days.    This is normal.  SYMPTOMS TO WATCH FOR AND REPORT TO YOUR PHYSICIAN:  1. Abdominal pain or bloating, other than gas cramps.  2. Chest pain.  3. Back pain.  4. Signs of infection such as: chills or fever occurring within 24 hours   after the procedure.  5. Rectal bleeding, which would show as bright red, maroon, or black stools.   (A tablespoon of blood from the rectum is not serious, especially if   hemorrhoids are present.)  6. Vomiting.  7. Weakness or dizziness.  GO DIRECTLY TO THE NEAREST EMERGENCY ROOM IF YOU HAVE ANY OF THE FOLLOWING:      Difficulty breathing              Chills and/or fever over 101 F   Persistent vomiting and/or vomiting blood   Severe abdominal pain   Severe chest pain   Black, tarry stools   Bleeding- more than one  tablespoon   Any other symptom or condition that you feel may need urgent attention  Your doctor recommends these additional instructions:  If any biopsies were taken, your doctors clinic will contact you in 1 to 2   weeks with any results.  Repeat balloon enteroscopy  For questions, problems or results please call your physician - Moustapha Guillen MD at Work:  [phone].  EMERGENCY PHONE NUMBER: [phone],  LAB RESULTS: [phone]  IF A COMPLICATION OR EMERGENCY SITUATION ARISES AND YOU ARE UNABLE TO REACH   YOUR PHYSICIAN - GO DIRECTLY TO THE EMERGENCY ROOM.  MD Moustapha Prabhakar MD  8/15/2018 11:51:48 AM  This report has been verified and signed electronically.  PROVATION

## 2018-08-16 ENCOUNTER — ANESTHESIA (OUTPATIENT)
Dept: ENDOSCOPY | Facility: HOSPITAL | Age: 68
DRG: 377 | End: 2018-08-16
Payer: MEDICARE

## 2018-08-16 LAB
ALBUMIN SERPL BCP-MCNC: 3.1 G/DL
ALP SERPL-CCNC: 35 U/L
ALT SERPL W/O P-5'-P-CCNC: 14 U/L
ANION GAP SERPL CALC-SCNC: 14 MMOL/L
AST SERPL-CCNC: 30 U/L
BASOPHILS # BLD AUTO: 0.06 K/UL
BASOPHILS NFR BLD: 0.9 %
BILIRUB SERPL-MCNC: 0.6 MG/DL
BUN SERPL-MCNC: 113 MG/DL
CALCIUM SERPL-MCNC: 9.5 MG/DL
CHLORIDE SERPL-SCNC: 101 MMOL/L
CO2 SERPL-SCNC: 20 MMOL/L
CREAT SERPL-MCNC: 13.1 MG/DL
DIFFERENTIAL METHOD: ABNORMAL
EOSINOPHIL # BLD AUTO: 0.4 K/UL
EOSINOPHIL NFR BLD: 6.2 %
ERYTHROCYTE [DISTWIDTH] IN BLOOD BY AUTOMATED COUNT: 16.3 %
EST. GFR  (AFRICAN AMERICAN): 4 ML/MIN/1.73 M^2
EST. GFR  (NON AFRICAN AMERICAN): 3 ML/MIN/1.73 M^2
GLUCOSE SERPL-MCNC: 93 MG/DL
HCT VFR BLD AUTO: 24.3 %
HCT VFR BLD AUTO: 28.4 %
HGB BLD-MCNC: 8.4 G/DL
HGB BLD-MCNC: 9.3 G/DL
LYMPHOCYTES # BLD AUTO: 0.7 K/UL
LYMPHOCYTES NFR BLD: 10.4 %
MAGNESIUM SERPL-MCNC: 2.3 MG/DL
MCH RBC QN AUTO: 28.6 PG
MCHC RBC AUTO-ENTMCNC: 34.6 G/DL
MCV RBC AUTO: 83 FL
MONOCYTES # BLD AUTO: 0.7 K/UL
MONOCYTES NFR BLD: 11.1 %
NEUTROPHILS # BLD AUTO: 4.4 K/UL
NEUTROPHILS NFR BLD: 68.8 %
PHOSPHATE SERPL-MCNC: 6.3 MG/DL
PLATELET # BLD AUTO: 137 K/UL
PLATELET BLD QL SMEAR: ABNORMAL
PMV BLD AUTO: 11.3 FL
POCT GLUCOSE: 82 MG/DL (ref 70–110)
POCT GLUCOSE: 91 MG/DL (ref 70–110)
POCT GLUCOSE: 93 MG/DL (ref 70–110)
POCT GLUCOSE: 97 MG/DL (ref 70–110)
POTASSIUM SERPL-SCNC: 5.7 MMOL/L
PROT SERPL-MCNC: 6.1 G/DL
RBC # BLD AUTO: 2.94 M/UL
SODIUM SERPL-SCNC: 135 MMOL/L
WBC # BLD AUTO: 6.46 K/UL

## 2018-08-16 PROCEDURE — 37000008 HC ANESTHESIA 1ST 15 MINUTES: Performed by: INTERNAL MEDICINE

## 2018-08-16 PROCEDURE — 63600175 PHARM REV CODE 636 W HCPCS: Performed by: NURSE ANESTHETIST, CERTIFIED REGISTERED

## 2018-08-16 PROCEDURE — 27200997: Performed by: INTERNAL MEDICINE

## 2018-08-16 PROCEDURE — 25000003 PHARM REV CODE 250: Performed by: STUDENT IN AN ORGANIZED HEALTH CARE EDUCATION/TRAINING PROGRAM

## 2018-08-16 PROCEDURE — 63600175 PHARM REV CODE 636 W HCPCS: Mod: JG | Performed by: INTERNAL MEDICINE

## 2018-08-16 PROCEDURE — 44378 SMALL BOWEL ENDOSCOPY: CPT | Performed by: INTERNAL MEDICINE

## 2018-08-16 PROCEDURE — 63600175 PHARM REV CODE 636 W HCPCS: Performed by: STUDENT IN AN ORGANIZED HEALTH CARE EDUCATION/TRAINING PROGRAM

## 2018-08-16 PROCEDURE — 86706 HEP B SURFACE ANTIBODY: CPT

## 2018-08-16 PROCEDURE — 80053 COMPREHEN METABOLIC PANEL: CPT

## 2018-08-16 PROCEDURE — 94761 N-INVAS EAR/PLS OXIMETRY MLT: CPT

## 2018-08-16 PROCEDURE — 84100 ASSAY OF PHOSPHORUS: CPT

## 2018-08-16 PROCEDURE — 27201030 HC OVERTUBE: Performed by: INTERNAL MEDICINE

## 2018-08-16 PROCEDURE — C9113 INJ PANTOPRAZOLE SODIUM, VIA: HCPCS | Performed by: STUDENT IN AN ORGANIZED HEALTH CARE EDUCATION/TRAINING PROGRAM

## 2018-08-16 PROCEDURE — 27202087 HC PROBE, APC: Performed by: INTERNAL MEDICINE

## 2018-08-16 PROCEDURE — 12000002 HC ACUTE/MED SURGE SEMI-PRIVATE ROOM

## 2018-08-16 PROCEDURE — 36415 COLL VENOUS BLD VENIPUNCTURE: CPT

## 2018-08-16 PROCEDURE — 25000003 PHARM REV CODE 250: Performed by: NURSE ANESTHETIST, CERTIFIED REGISTERED

## 2018-08-16 PROCEDURE — 85014 HEMATOCRIT: CPT

## 2018-08-16 PROCEDURE — 85018 HEMOGLOBIN: CPT

## 2018-08-16 PROCEDURE — 0W3P8ZZ CONTROL BLEEDING IN GASTROINTESTINAL TRACT, VIA NATURAL OR ARTIFICIAL OPENING ENDOSCOPIC: ICD-10-PCS | Performed by: INTERNAL MEDICINE

## 2018-08-16 PROCEDURE — 85025 COMPLETE CBC W/AUTO DIFF WBC: CPT

## 2018-08-16 PROCEDURE — 83735 ASSAY OF MAGNESIUM: CPT

## 2018-08-16 PROCEDURE — 80100016 HC MAINTENANCE HEMODIALYSIS

## 2018-08-16 PROCEDURE — 37000009 HC ANESTHESIA EA ADD 15 MINS: Performed by: INTERNAL MEDICINE

## 2018-08-16 RX ORDER — ONDANSETRON 2 MG/ML
4 INJECTION INTRAMUSCULAR; INTRAVENOUS DAILY PRN
Status: DISCONTINUED | OUTPATIENT
Start: 2018-08-16 | End: 2018-08-16 | Stop reason: HOSPADM

## 2018-08-16 RX ORDER — SODIUM CHLORIDE 0.9 % (FLUSH) 0.9 %
3 SYRINGE (ML) INJECTION EVERY 8 HOURS
Status: DISCONTINUED | OUTPATIENT
Start: 2018-08-16 | End: 2018-08-16 | Stop reason: HOSPADM

## 2018-08-16 RX ORDER — OXYCODONE HYDROCHLORIDE 5 MG/1
5 TABLET ORAL
Status: DISCONTINUED | OUTPATIENT
Start: 2018-08-16 | End: 2018-08-16 | Stop reason: HOSPADM

## 2018-08-16 RX ORDER — ETOMIDATE 2 MG/ML
INJECTION INTRAVENOUS
Status: DISCONTINUED | OUTPATIENT
Start: 2018-08-16 | End: 2018-08-16

## 2018-08-16 RX ORDER — SODIUM CHLORIDE 9 MG/ML
INJECTION, SOLUTION INTRAVENOUS CONTINUOUS PRN
Status: DISCONTINUED | OUTPATIENT
Start: 2018-08-16 | End: 2018-08-16

## 2018-08-16 RX ORDER — ONDANSETRON 2 MG/ML
8 INJECTION INTRAMUSCULAR; INTRAVENOUS ONCE
Status: COMPLETED | OUTPATIENT
Start: 2018-08-16 | End: 2018-08-16

## 2018-08-16 RX ORDER — FENTANYL CITRATE 50 UG/ML
INJECTION, SOLUTION INTRAMUSCULAR; INTRAVENOUS
Status: DISCONTINUED | OUTPATIENT
Start: 2018-08-16 | End: 2018-08-16

## 2018-08-16 RX ORDER — SIMETHICONE 125 MG
125 TABLET,CHEWABLE ORAL EVERY 6 HOURS PRN
Status: DISCONTINUED | OUTPATIENT
Start: 2018-08-16 | End: 2018-08-19 | Stop reason: HOSPADM

## 2018-08-16 RX ORDER — SODIUM CHLORIDE 0.9 % (FLUSH) 0.9 %
3 SYRINGE (ML) INJECTION
Status: DISCONTINUED | OUTPATIENT
Start: 2018-08-16 | End: 2018-08-19 | Stop reason: HOSPADM

## 2018-08-16 RX ORDER — HYDROMORPHONE HYDROCHLORIDE 2 MG/ML
0.2 INJECTION, SOLUTION INTRAMUSCULAR; INTRAVENOUS; SUBCUTANEOUS EVERY 5 MIN PRN
Status: ACTIVE | OUTPATIENT
Start: 2018-08-16 | End: 2018-08-16

## 2018-08-16 RX ORDER — HYDROMORPHONE HYDROCHLORIDE 2 MG/ML
0.5 INJECTION, SOLUTION INTRAMUSCULAR; INTRAVENOUS; SUBCUTANEOUS EVERY 5 MIN PRN
Status: ACTIVE | OUTPATIENT
Start: 2018-08-16 | End: 2018-08-16

## 2018-08-16 RX ORDER — DIPHENHYDRAMINE HYDROCHLORIDE 50 MG/ML
25 INJECTION INTRAMUSCULAR; INTRAVENOUS EVERY 6 HOURS PRN
Status: DISCONTINUED | OUTPATIENT
Start: 2018-08-16 | End: 2018-08-16 | Stop reason: HOSPADM

## 2018-08-16 RX ORDER — SUCCINYLCHOLINE CHLORIDE 20 MG/ML
INJECTION INTRAMUSCULAR; INTRAVENOUS
Status: DISCONTINUED | OUTPATIENT
Start: 2018-08-16 | End: 2018-08-16

## 2018-08-16 RX ORDER — HYDROMORPHONE HYDROCHLORIDE 2 MG/ML
0.5 INJECTION, SOLUTION INTRAMUSCULAR; INTRAVENOUS; SUBCUTANEOUS EVERY 5 MIN PRN
Status: DISCONTINUED | OUTPATIENT
Start: 2018-08-16 | End: 2018-08-16 | Stop reason: HOSPADM

## 2018-08-16 RX ORDER — LIDOCAINE HCL/PF 100 MG/5ML
SYRINGE (ML) INTRAVENOUS
Status: DISCONTINUED | OUTPATIENT
Start: 2018-08-16 | End: 2018-08-16

## 2018-08-16 RX ADMIN — LIDOCAINE HYDROCHLORIDE 50 MG: 20 INJECTION, SOLUTION INTRAVENOUS at 01:08

## 2018-08-16 RX ADMIN — HYDRALAZINE HYDROCHLORIDE 10 MG: 20 INJECTION INTRAMUSCULAR; INTRAVENOUS at 07:08

## 2018-08-16 RX ADMIN — FLUTICASONE PROPIONATE 100 MCG: 50 SPRAY, METERED NASAL at 09:08

## 2018-08-16 RX ADMIN — GUAIFENESIN 600 MG: 600 TABLET, EXTENDED RELEASE ORAL at 09:08

## 2018-08-16 RX ADMIN — SENNOSIDES 8.6 MG: 8.6 TABLET, FILM COATED ORAL at 07:08

## 2018-08-16 RX ADMIN — CALCIUM ACETATE 1334 MG: 667 CAPSULE ORAL at 05:08

## 2018-08-16 RX ADMIN — LIDOCAINE 1 PATCH: 50 PATCH TOPICAL at 09:08

## 2018-08-16 RX ADMIN — ASPIRIN 81 MG 81 MG: 81 TABLET ORAL at 09:08

## 2018-08-16 RX ADMIN — FUROSEMIDE 120 MG: 40 TABLET ORAL at 09:08

## 2018-08-16 RX ADMIN — DEXTROSE 8 MG/HR: 50 INJECTION, SOLUTION INTRAVENOUS at 06:08

## 2018-08-16 RX ADMIN — TICAGRELOR 90 MG: 90 TABLET ORAL at 09:08

## 2018-08-16 RX ADMIN — ETOMIDATE 12 MG: 2 INJECTION, SOLUTION INTRAVENOUS at 01:08

## 2018-08-16 RX ADMIN — SODIUM CHLORIDE: 0.9 INJECTION, SOLUTION INTRAVENOUS at 01:08

## 2018-08-16 RX ADMIN — CARVEDILOL 3.12 MG: 3.12 TABLET, FILM COATED ORAL at 09:08

## 2018-08-16 RX ADMIN — SUCCINYLCHOLINE CHLORIDE 120 MG: 20 INJECTION, SOLUTION INTRAMUSCULAR; INTRAVENOUS at 01:08

## 2018-08-16 RX ADMIN — TRAMADOL HYDROCHLORIDE 50 MG: 50 TABLET, COATED ORAL at 07:08

## 2018-08-16 RX ADMIN — LEVOTHYROXINE SODIUM 50 MCG: 50 TABLET ORAL at 06:08

## 2018-08-16 RX ADMIN — ERYTHROPOIETIN 10000 UNITS: 10000 INJECTION, SOLUTION INTRAVENOUS; SUBCUTANEOUS at 11:08

## 2018-08-16 RX ADMIN — FENTANYL CITRATE 100 MCG: 50 INJECTION, SOLUTION INTRAMUSCULAR; INTRAVENOUS at 01:08

## 2018-08-16 RX ADMIN — Medication 125 MG: at 10:08

## 2018-08-16 RX ADMIN — ONDANSETRON 8 MG: 2 INJECTION INTRAMUSCULAR; INTRAVENOUS at 04:08

## 2018-08-16 RX ADMIN — MINOXIDIL 2.5 MG: 2.5 TABLET ORAL at 09:08

## 2018-08-16 RX ADMIN — DEXTROSE 8 MG/HR: 50 INJECTION, SOLUTION INTRAVENOUS at 09:08

## 2018-08-16 NOTE — PROVATION PATIENT INSTRUCTIONS
Discharge Summary/Instructions after an Endoscopic Procedure  Patient Name: Ki Knight  Patient MRN: [id number]  Patient YOB: 1950 Thursday, August 16, 2018  Moustapha Guillen MD  RESTRICTIONS:  During your procedure today, you received medications for sedation.  These   medications may affect your judgment, balance and coordination.  Therefore,   for 24 hours, you have the following restrictions:   - DO NOT drive a car, operate machinery, make legal/financial decisions,   sign important papers or drink alcohol.    ACTIVITY:  Today: no heavy lifting, straining or running due to procedural   sedation/anesthesia.  The following day: return to full activity including work.  DIET:  Eat and drink normally unless instructed otherwise.     TREATMENT FOR COMMON SIDE EFFECTS:  - Mild abdominal pain, nausea, belching, bloating or excessive gas:  rest,   eat lightly and use a heating pad.  - Sore Throat: treat with throat lozenges and/or gargle with warm salt   water.  - Because air was used during the procedure, expelling large amounts of air   from your rectum or belching is normal.  - If a bowel prep was taken, you may not have a bowel movement for 1-3 days.    This is normal.  SYMPTOMS TO WATCH FOR AND REPORT TO YOUR PHYSICIAN:  1. Abdominal pain or bloating, other than gas cramps.  2. Chest pain.  3. Back pain.  4. Signs of infection such as: chills or fever occurring within 24 hours   after the procedure.  5. Rectal bleeding, which would show as bright red, maroon, or black stools.   (A tablespoon of blood from the rectum is not serious, especially if   hemorrhoids are present.)  6. Vomiting.  7. Weakness or dizziness.  GO DIRECTLY TO THE NEAREST EMERGENCY ROOM IF YOU HAVE ANY OF THE FOLLOWING:      Difficulty breathing              Chills and/or fever over 101 F   Persistent vomiting and/or vomiting blood   Severe abdominal pain   Severe chest pain   Black, tarry stools   Bleeding- more than one  tablespoon   Any other symptom or condition that you feel may need urgent attention  Your doctor recommends these additional instructions:  If any biopsies were taken, your doctors clinic will contact you in 1 to 2   weeks with any results.  Regular diet and discharge to home today   Continue DAPT for 6 months total due to coronary stent  For questions, problems or results please call your physician - Moustapha Guillen MD at Work:  [phone].  EMERGENCY PHONE NUMBER: [phone],  LAB RESULTS: [phone]  IF A COMPLICATION OR EMERGENCY SITUATION ARISES AND YOU ARE UNABLE TO REACH   YOUR PHYSICIAN - GO DIRECTLY TO THE EMERGENCY ROOM.  MD Moustapha Prabhakar MD  8/16/2018 2:27:07 PM  This report has been verified and signed electronically.  PROVATION

## 2018-08-16 NOTE — PLAN OF CARE
Patient doing well this AM without any overnight issues. He has still not had a BM. Plan for repeat upper balloon enterosocopy today. Patient off Brillinta yesterday, but resumed and will continue for procedure due to high risk of thrombosis off DAPT. Results to follow later with procedure note.    Yakov Smith,   [facility]-2  Gastroenterology Service

## 2018-08-16 NOTE — PLAN OF CARE
Problem: Patient Care Overview  Goal: Plan of Care Review  Outcome: Ongoing (interventions implemented as appropriate)  The pt has slept well between care.  He had complaints of pain in his right lower back and leg which was relieved with medication and a Lidoderm patch to his right hip.  He has had no signs of bleeding.His blood glucose levels have been monitored and did not require coverage. He has had no falls.

## 2018-08-16 NOTE — NURSING
Pt  pantaprozole was administered by IVPB with the 40mg dose in 250ml bag. When IV started it was suppose to be at 20ml/hr and the whole bag was infused.Dr Brannon was notified. Pt without any s/sx of adverse side effects noted at this time.Pts dose on hold until 6am. Pt aware of incident and notified of dose change. Night shift nurse notified of change.

## 2018-08-16 NOTE — PLAN OF CARE
Problem: Hemodialysis (Adult)  Goal: Signs and Symptoms of Listed Potential Problems Will be Absent, Minimized or Managed (Hemodialysis)  Signs and symptoms of listed potential problems will be absent, minimized or managed by discharge/transition of care (reference Hemodialysis (Adult) CPG).   08/16/18 1045   Hemodialysis   Problems Assessed (Hemodialysis) all   Problems Present (Hemodialysis) electrolyte imbalance       HD W/UF, POC DISCUSSED W/PT

## 2018-08-16 NOTE — ANESTHESIA POSTPROCEDURE EVALUATION
"Anesthesia Post Evaluation    Patient: Ki Roque    Procedure(s) Performed: Procedure(s) (LRB):  upper single balloon with general anesthesia (N/A)    Final Anesthesia Type: general  Patient location during evaluation: PACU  Patient participation: Yes- Able to Participate  Level of consciousness: awake and alert  Post-procedure vital signs: reviewed and stable  Pain management: adequate  Airway patency: patent  PONV status at discharge: No PONV  Anesthetic complications: no      Cardiovascular status: blood pressure returned to baseline  Respiratory status: unassisted  Hydration status: euvolemic          Visit Vitals  BP (!) 150/66   Pulse 76   Temp 36.7 °C (98.1 °F)   Resp 10   Ht 5' 6" (1.676 m)   Wt 83 kg (182 lb 15.7 oz)   SpO2 98%   BMI 29.53 kg/m²       Pain/Megan Score: Pain Assessment Performed: Yes (8/16/2018  3:15 PM)  Presence of Pain: denies (8/16/2018  3:15 PM)  Pain Rating Prior to Med Admin: 6 (8/15/2018 10:26 PM)  Pain Rating Post Med Admin: 0 (8/15/2018 11:26 PM)  Megan Score: 10 (8/16/2018  3:15 PM)        "

## 2018-08-16 NOTE — PLAN OF CARE
Problem: Patient Care Overview  Goal: Plan of Care Review  Outcome: Ongoing (interventions implemented as appropriate)  Plan of care reviewed with patient.Pt had dialysis but no fluids were removed per MD order. Went to endo today for a procedure. Outcome good. Pt came back complaining of nausea and vomiting. PRN zofran given. Med effective. Fistula with bruits and thrills at this time. Verbalizes to staff understanding. NSR on monitor with 0 red alarms noted.  No acute distress observed at this time. Side rails x2, bed in low position, call bell within reach. Bed alarm in place for patient safety. Will relay to oncoming nurse to monitor for changes in condition.

## 2018-08-16 NOTE — PLAN OF CARE
Pt has met discharge criteria, VSS, denies complaints, abd soft. O2 sat on room air is 98%.. Released per Dr. Caceres and report called to Nicci SANTIZO on 4a

## 2018-08-16 NOTE — PROGRESS NOTES
[employer] renal fellow [facility]      Subjective:      Interval Hx:  -pt complains of persistent abd bloating  -to have repeat EGD today whil kelvin ASA/Brilinta       Objective:   Last 24 Hour Vital Signs:  BP  Min: 91/50  Max: 197/99  Temp  Av.7 °F (36.5 °C)  Min: 96.8 °F (36 °C)  Max: 99.1 °F (37.3 °C)  Pulse  Av.1  Min: 68  Max: 92  Resp  Av.3  Min: 0  Max: 53  SpO2  Av.6 %  Min: 98 %  Max: 100 %  Weight  Av kg (182 lb 15.7 oz)  Min: 83 kg (182 lb 15.7 oz)  Max: 83 kg (182 lb 15.7 oz)  I/O last 3 completed shifts:  In: 1680 [P.O.:315; I.V.:1140; Blood:225]  Out: -     Physical Examination:  GEN - NAD, AAOx4  HEAD - NCAT   EYES - PERRLA, non icteric   CHEST - lungs CTA bilaterally; equal expansion   HEART - RRR, no r/s3/s4; + 3/6 midsystolic murmur loudest at LSB 2nd ICS  ABD - distended, non ttp; no guarding or rigidity; + BS   EXTR  - warm; no edema  Skin - no tenting; no bruising; no ecchymosis   Neuro - no asterixis or localizing deficits        Laboratory:  Laboratory   Pertinent Findings:  Recent Labs   Lab  18   0517   08/15/18   0535  08/15/18   1856  08/15/18   2312  18   0534   WBC  4.70   --   5.27   --    --   6.46   HGB  7.7*  7.4*   < >  7.1*  6.9*  9.3*  8.8*  8.4*   HCT  22.6*  21.7*   < >  20.7*  20.9*  27.4*  26.4*  24.3*   PLT  90*   --   127*   --    --   137*   MCV  82   --   83   --    --   83   RDW  16.7*   --   16.8*   --    --   16.3*   NA  137   --   134*   --    --   135*   K  4.4   --   4.4   --    --   5.7*   CL  101   --   100   --    --   101   CO2  25   --   25   --    --   20*   BUN  103*   --   86*   --    --   113*   GLU  115*   --   114*   --    --   93   PROT  5.6*   --   5.5*   --    --   6.1   ALBUMIN  2.9*   --   3.0*   --    --   3.1*   BILITOT  0.5   --   0.4   --    --   0.6   AST  18   --   17   --    --   30   ALKPHOS  34*   --   32*   --    --   35*   ALT  13   --   9*   --    --   14    < > = values in this interval not displayed.        Current Medications:     Infusions:   pantoprazole 40 mg in dextrose 5 % 100 mL infusion (ready to mix system) 8 mg/hr (08/16/18 0603)        Scheduled:   amLODIPine  10 mg Oral Daily    aspirin  81 mg Oral Daily    calcitRIOL  0.25 mcg Oral Daily    calcium acetate  1,334 mg Oral TID WM    carvedilol  3.125 mg Oral BID    epoetin neva (PROCRIT) injection  10,000 Units Intravenous Once in dialysis    fluticasone  2 spray Each Nare BID    furosemide  120 mg Oral BID    guaiFENesin  600 mg Oral BID    levothyroxine  50 mcg Oral Before breakfast    lidocaine  1 patch Transdermal Q24H    metoclopramide HCl  10 mg Intravenous Once    metOLazone  5 mg Oral Daily    minoxidil  2.5 mg Oral BID    ondansetron  4 mg Intravenous Once    ticagrelor  90 mg Oral BID    vitamin renal formula (B-complex-vitamin c-folic acid)  1 capsule Oral Daily        PRN:  sodium chloride, sodium chloride, acetaminophen, acetaminophen, dextrose 50%, dextrose 50%, fluticasone, glucagon (human recombinant), glucose, glucose, hydrALAZINE, insulin aspart U-100, ramelteon, senna, sodium chloride 0.9%, traMADol        Assessment/Plan     This is a 69 yo AA male who was transferred from outside hospital for persistent GI bleeding; L-renal consulted for     ESRD  -ESRD 2/2 DM in 2016; pt is a home hemodialysis pt who dialyzes M/T/Th/F x 2.5 hrs; target weight 77-78kg; pt makes less than a cup of urine/day; access proximal LUE AVG  -outpt nephrologist Dr. Manriquez in [place]  -will cont outpt HD schedule while in house  -cont home metolazone 5 mg + lasix 120mg BID; cont home phoslo 1334mg with meals when reshma po  -will order nephrocaps     Normocytic anemia  -will give epo/IV iron with HD prn     MBD  -cont oral calcitriol     Malnutrition  -will order nepro with meals    GI bleed  -H/H stable  -repeat DBE today       Bubba Guallpa II  [facility]  [phone]

## 2018-08-16 NOTE — PROGRESS NOTES
PGY-2 Progress Note [facility]  Follow up for: GI Bleed    Hospital Stay Day 3    Subjective: Afebrile, BP elevated this AM, +bm/+flatus, tolerating diet without n/v, ambulating.     Pt reports improvement in his head cold and abdominal distention    Denies any CP, SOB, N/V/D, headaches, fever or chills.     Scheduled Meds:   amLODIPine  10 mg Oral Daily    aspirin  81 mg Oral Daily    calcitRIOL  0.25 mcg Oral Daily    calcium acetate  1,334 mg Oral TID WM    carvedilol  3.125 mg Oral BID    epoetin neva (PROCRIT) injection  10,000 Units Intravenous Once in dialysis    fluticasone  2 spray Each Nare BID    furosemide  120 mg Oral BID    guaiFENesin  600 mg Oral BID    levothyroxine  50 mcg Oral Before breakfast    lidocaine  1 patch Transdermal Q24H    metoclopramide HCl  10 mg Intravenous Once    metOLazone  5 mg Oral Daily    minoxidil  2.5 mg Oral BID    ondansetron  4 mg Intravenous Once    ticagrelor  90 mg Oral BID    vitamin renal formula (B-complex-vitamin c-folic acid)  1 capsule Oral Daily     Continuous Infusions:   pantoprazole 40 mg in dextrose 5 % 100 mL infusion (ready to mix system) 8 mg/hr (18 0603)     PRN Meds:sodium chloride, sodium chloride, acetaminophen, acetaminophen, dextrose 50%, dextrose 50%, fluticasone, glucagon (human recombinant), glucose, glucose, hydrALAZINE, insulin aspart U-100, ramelteon, senna, sodium chloride 0.9%, traMADol    Review of patient's allergies indicates:  No Known Allergies    Objectives:     Vitals(Most Recent)      BP  Min: 91/50  Max: 197/99  Temp  Av.7 °F (36.5 °C)  Min: 96.8 °F (36 °C)  Max: 99.1 °F (37.3 °C)  Pulse  Av.3  Min: 68  Max: 92  Resp  Av.4  Min: 0  Max: 53  SpO2  Av.6 %  Min: 98 %  Max: 100 %  Weight  Av kg (182 lb 15.7 oz)  Min: 83 kg (182 lb 15.7 oz)  Max: 83 kg (182 lb 15.7 oz)             Vitals([id number])  Temp:  [96.8 °F (36 °C)-99.1 °F (37.3 °C)]   Pulse:  [68-92]   Resp:  [0-53]   BP:  ()/(50-99)   SpO2:  [98 %-100 %]     I & O([id number])    Intake/Output Summary (Last 24 hours) at 8/16/2018 0837  Last data filed at 8/15/2018 2125  Gross per 24 hour   Intake 765 ml   Output --   Net 765 ml     General: AAOx3. NAD.  HEENT: NCAT. PERRLA.  Neck: Supple.   CV: RRR, no murmur   Chest: CTAB  Abd: +BS x 4. Soft. Distended, non- tender   Ext: trace edema  Skin: Intact. No rash. No lesions.   Neuro: CN II-XII intact. No focal deficit. Strength 5/5 throughout. Sensation intact.   Psych: Good judgement and reason. No A/V hallucinations. NL affect. No abnormal behaviors noted    LABS  CBC  Recent Labs   Lab  08/14/18   0517   08/15/18   0535  08/15/18   1856  08/15/18   2312  08/16/18   0534   WBC  4.70   --   5.27   --    --   6.46   RBC  2.77*   --   2.51*   --    --   2.94*   HGB  7.7*  7.4*   < >  7.1*  6.9*  9.3*  8.8*  8.4*   HCT  22.6*  21.7*   < >  20.7*  20.9*  27.4*  26.4*  24.3*   PLT  90*   --   127*   --    --   137*   MCV  82   --   83   --    --   83   MCH  27.8   --   28.3   --    --   28.6   MCHC  34.1   --   34.3   --    --   34.6    < > = values in this interval not displayed.     BMP  Recent Labs   Lab  08/14/18   0517  08/15/18   0535  08/16/18   0534   NA  137  134*  135*   K  4.4  4.4  5.7*   CO2  25  25  20*   CL  101  100  101   BUN  103*  86*  113*   CREATININE  11.8*  11.3*  13.1*   GLU  115*  114*  93       POCT-Glucose  POCT Glucose   Date Value Ref Range Status   08/16/2018 91 70 - 110 mg/dL Final   08/15/2018 120 (H) 70 - 110 mg/dL Final   08/15/2018 118 (H) 70 - 110 mg/dL Final   08/15/2018 113 (H) 70 - 110 mg/dL Final   08/14/2018 147 (H) 70 - 110 mg/dL Final   08/14/2018 151 (H) 70 - 110 mg/dL Final   08/14/2018 113 (H) 70 - 110 mg/dL Final   08/13/2018 123 (H) 70 - 110 mg/dL Final       Recent Labs   Lab  08/14/18   0517  08/15/18   0535  08/16/18   0534   CALCIUM  8.8  9.0  9.5   MG  2.0  2.0  2.3   PHOS  4.3  5.0*  6.3*     LFT  Recent Labs   Lab  08/14/18   0517   08/15/18   0535  08/16/18   0534   PROT  5.6*  5.5*  6.1   ALBUMIN  2.9*  3.0*  3.1*   BILITOT  0.5  0.4  0.6   AST  18  17  30   ALKPHOS  34*  32*  35*   ALT  13  9*  14     LAST HbA1c  Lab Results   Component Value Date    HGBA1C 6.2 (H) 08/13/2018       Imaging- no new imaging obtained    Micro- no new micro obtained    Assessment/Plan:   Patient is a 68 y.o. AA male with hx of ESRD (HD MTTF), HTN, CAD s/p stenting (on 8/6/18RCA on brillinta) and previous GI bleeds 2/2 small bowel  Transferred from OSH for GI work up and evaluation for GI bleed with plans for double bubble study with capsule study.      GI Bleed  -  At OSH, EGD per general surgery revealed gastritis, duodenitis and multiple small punctate ulcers with no active bleeding  - Underwent capsule study with GI  - EGD performed yesterday- no active bleeding seen  - transfused 2 units pRBC yesterday due to low H/H  - trending H/H, stable at this time  - Will need another EGD with GI while on Brilinta and asa    HTN  - continue amlodipine 10mg  - continue carvedilol 3.125mg    CAD s/p recent stent placement   - continue asa and brilinta    ESRD  - received HD on MTThF  - nephro consulted and following  - Will have HD tooday    T2DM  - A1c 6.2  - POCT glucose QID  - SSI      Code: full  Diet: NPO   Dispo: will have HD and EGD with GI today, follow-up further recs    Case discussed with staff    Katlyn Boss MD  [facility] HO2  08/16/2018

## 2018-08-16 NOTE — PLAN OF CARE
Patient was placed on a pantoprazole 40 mg continuous infusion but he accidentally received the entire amount in one dose per nurse. PPI held until 6 am tomorrow since he received enough pantoprazole in one dose to cover until 6 am tomorrow.     Yung Brannon MD  [facility]   08/15/2018

## 2018-08-16 NOTE — ANESTHESIA PREPROCEDURE EVALUATION
08/16/2018  Ki Roque is a 68 y.o., male w GI angiodysplasia & GI blood loss anemia; admitted to ICU->med/surg floor; transfused this admission; had prox enteroscopy GA 20180815; now for additional upper GI endo 20180816 ([place]).    PRIOR ANES (in Epic) 20180815 [id number] Upper_GI_Endoscopy [place]    [fent 50, etom 12] ->110 (w some sevo)    [sevo] SBP 90<->180    [ø mask vent; ET 7.5, Manriquez#2, Grade I]  [id number] SmBowelEnteroscopy [place]    [fent 50, prop 100] SB P 200->150    [sevo, prop 100, fent 20] VSS NAAC    [easy mask; ETT 7.5, Manriquez#2, Grade I]  20171214 EGD&PushEnteroscopy MAC    fent 25X4, prop 50 -> 100 mcg/kg/min    NAAC    ANES-RELATED MED/SURG  Patient Active Problem List   Diagnosis    Blood loss anemia    Angiodysplasia    Iron deficiency anemia due to chronic blood loss    GI bleed    CAD (coronary artery disease)    ESRD (end stage renal disease)     Past Medical History:   Diagnosis Date    Diabetes mellitus     Encounter for blood transfusion     Hemodialysis access, AV graft     Hypertension     Thyroid disease      Past Surgical History:   Procedure Laterality Date    graft placement        ALLERGIES  Review of patient's allergies indicates:  No Known Allergies    ANES-RELATED MAR [id number]  amlodipine  fluticasone   carvedilol      minoxidil  furosemide-PO 120bid  asa-81  ticagelor    levothyroxine pantoprazole-IV  insulin-SQ/SS    Pre-op Assessment      I have reviewed the Medications.     Review of Systems  Anesthesia Hx:  No problems with previous Anesthesia Denies Hx of Anesthetic complications   Denies Personal Hx of Anesthesia complications.   Hematology/Oncology:         -- Anemia:   Cardiovascular:   Hypertension    Renal/:   Chronic Renal Disease, ESRD, Dialysis    Endocrine:   Diabetes      Wt Readings from Last 1 Encounters:   08/16/18 83 kg (182 lb 15.7 oz)      Temp Readings from Last 1 Encounters:   08/16/18 37.1 °C (98.8 °F) (Temporal)     BP Readings from Last 1 Encounters:   08/16/18 (!) 177/68     Pulse Readings from Last 1 Encounters:   08/16/18 87     SpO2 Readings from Last 1 Encounters:   08/16/18 99%       Physical Exam  General:  Well nourished    Airway/Jaw/Neck:  Airway Findings:      Chest/Lungs:  Chest/Lungs Findings:    Heart/Vascular:  Heart Findings:       Mental Status:  Mental Status Findings:       Lab Results   Component Value Date    WBC 6.46 08/16/2018    HGB 8.4 (L) 08/16/2018    HCT 24.3 (L) 08/16/2018    MCV 83 08/16/2018     (L) 08/16/2018       Chemistry        Component Value Date/Time     (L) 08/16/2018 0534    K 5.7 (H) 08/16/2018 0534     08/16/2018 0534    CO2 20 (L) 08/16/2018 0534     (H) 08/16/2018 0534    CREATININE 13.1 (H) 08/16/2018 0534    GLU 93 08/16/2018 0534        Component Value Date/Time    CALCIUM 9.5 08/16/2018 0534    ALKPHOS 35 (L) 08/16/2018 0534    AST 30 08/16/2018 0534    ALT 14 08/16/2018 0534    BILITOT 0.6 08/16/2018 0534    ESTGFRAFRICA 4 (A) 08/16/2018 0534    EGFRNONAA 3 (A) 08/16/2018 0534          Lab Results   Component Value Date    ALBUMIN 3.1 (L) 08/16/2018      Lab Results   Component Value Date    TSH 2.486 08/13/2018   No results found for: APTT  No results found for: INR, PROTIME      CXR 20180813  Clear lungs.  Enlarged cardiac silhouette which may be in part related to AP view. Lungs grossly clear    EKG 20180813  Normal sinus rhythm with sinus arrhythmia  T wave abnormality, consider lateral ischemia  Abnormal ECG  When compared with ECG of 22-JAN-2018 09:14, no significant change was found  Confirmed by Josue     ECHO        Anesthesia Plan  Type of Anesthesia, risks & benefits discussed:  Anesthesia Type:  MAC, general  Patient's Preference:   Intra-op Monitoring Plan: standard ASA monitors  Intra-op Monitoring Plan Comments:   Post Op Pain Control Plan: multimodal  analgesia  Post Op Pain Control Plan Comments:   Induction:   Inhalation  Beta Blocker:  Patient is not currently on a Beta-Blocker (No further documentation required).       Informed Consent: Patient understands risks and agrees with Anesthesia plan.  Questions answered. Anesthesia consent signed with patient.  ASA Score: 3     Day of Surgery Review of History & Physical:        Anesthesia Plan Notes: Labs pending        Ready For Surgery From Anesthesia Perspective.

## 2018-08-16 NOTE — TRANSFER OF CARE
"Anesthesia Transfer of Care Note    Patient: Ki Roque    Procedure(s) Performed: Procedure(s) (LRB):  upper single balloon with general anesthesia (N/A)    Patient location: PACU    Anesthesia Type: general    Transport from OR: Transported from OR on 100% O2 by closed face mask with adequate spontaneous ventilation    Post pain: adequate analgesia    Post assessment: no apparent anesthetic complications    Post vital signs: stable    Level of consciousness: awake, alert and oriented    Nausea/Vomiting: no nausea/vomiting    Complications: none    Transfer of care protocol was followed      Last vitals:   Visit Vitals  BP (!) 151/67   Pulse 90   Temp 36.7 °C (98.1 °F) (Skin)   Resp 11   Ht 5' 6" (1.676 m)   Wt 83 kg (182 lb 15.7 oz)   SpO2 100%   BMI 29.53 kg/m²     "

## 2018-08-17 PROBLEM — K92.2 GI BLEED: Status: RESOLVED | Noted: 2018-08-13 | Resolved: 2018-08-17

## 2018-08-17 LAB
ABO + RH BLD: NORMAL
ALBUMIN SERPL BCP-MCNC: 3.1 G/DL
ALP SERPL-CCNC: 41 U/L
ALT SERPL W/O P-5'-P-CCNC: 13 U/L
ANION GAP SERPL CALC-SCNC: 12 MMOL/L
AST SERPL-CCNC: 25 U/L
BASOPHILS # BLD AUTO: 0.03 K/UL
BASOPHILS NFR BLD: 0.4 %
BILIRUB SERPL-MCNC: 0.5 MG/DL
BLD GP AB SCN CELLS X3 SERPL QL: NORMAL
BUN SERPL-MCNC: 84 MG/DL
CALCIUM SERPL-MCNC: 9.1 MG/DL
CHLORIDE SERPL-SCNC: 99 MMOL/L
CO2 SERPL-SCNC: 22 MMOL/L
CREAT SERPL-MCNC: 10.8 MG/DL
DIFFERENTIAL METHOD: ABNORMAL
EOSINOPHIL # BLD AUTO: 0.3 K/UL
EOSINOPHIL NFR BLD: 3.7 %
ERYTHROCYTE [DISTWIDTH] IN BLOOD BY AUTOMATED COUNT: 16.6 %
EST. GFR  (AFRICAN AMERICAN): 5 ML/MIN/1.73 M^2
EST. GFR  (NON AFRICAN AMERICAN): 4 ML/MIN/1.73 M^2
GLUCOSE SERPL-MCNC: 111 MG/DL
HCT VFR BLD AUTO: 21.5 %
HCT VFR BLD AUTO: 22.2 %
HCT VFR BLD AUTO: 24 %
HCT VFR BLD AUTO: 24.9 %
HEP. B SURF AB, QUAL: NEGATIVE
HEP. B SURF AB, QUANT.: 9 MIU/ML
HGB BLD-MCNC: 7.3 G/DL
HGB BLD-MCNC: 7.5 G/DL
HGB BLD-MCNC: 8.2 G/DL
HGB BLD-MCNC: 8.5 G/DL
LYMPHOCYTES # BLD AUTO: 0.8 K/UL
LYMPHOCYTES NFR BLD: 9.8 %
MAGNESIUM SERPL-MCNC: 2 MG/DL
MCH RBC QN AUTO: 29 PG
MCHC RBC AUTO-ENTMCNC: 34.2 G/DL
MCV RBC AUTO: 85 FL
MONOCYTES # BLD AUTO: 0.3 K/UL
MONOCYTES NFR BLD: 4.2 %
NEUTROPHILS # BLD AUTO: 6.4 K/UL
NEUTROPHILS NFR BLD: 81.6 %
PHOSPHATE SERPL-MCNC: 4.5 MG/DL
PLATELET # BLD AUTO: 143 K/UL
PMV BLD AUTO: 10.4 FL
POCT GLUCOSE: 106 MG/DL (ref 70–110)
POCT GLUCOSE: 117 MG/DL (ref 70–110)
POCT GLUCOSE: 131 MG/DL (ref 70–110)
POCT GLUCOSE: 161 MG/DL (ref 70–110)
POTASSIUM SERPL-SCNC: 4.5 MMOL/L
PROT SERPL-MCNC: 6.1 G/DL
RBC # BLD AUTO: 2.83 M/UL
SODIUM SERPL-SCNC: 133 MMOL/L
WBC # BLD AUTO: 7.83 K/UL

## 2018-08-17 PROCEDURE — 25000003 PHARM REV CODE 250: Performed by: STUDENT IN AN ORGANIZED HEALTH CARE EDUCATION/TRAINING PROGRAM

## 2018-08-17 PROCEDURE — 85018 HEMOGLOBIN: CPT | Mod: 91

## 2018-08-17 PROCEDURE — 94761 N-INVAS EAR/PLS OXIMETRY MLT: CPT

## 2018-08-17 PROCEDURE — C9113 INJ PANTOPRAZOLE SODIUM, VIA: HCPCS | Performed by: STUDENT IN AN ORGANIZED HEALTH CARE EDUCATION/TRAINING PROGRAM

## 2018-08-17 PROCEDURE — 85014 HEMATOCRIT: CPT | Mod: 91

## 2018-08-17 PROCEDURE — 12000002 HC ACUTE/MED SURGE SEMI-PRIVATE ROOM

## 2018-08-17 PROCEDURE — 86920 COMPATIBILITY TEST SPIN: CPT

## 2018-08-17 PROCEDURE — 83735 ASSAY OF MAGNESIUM: CPT

## 2018-08-17 PROCEDURE — 85014 HEMATOCRIT: CPT

## 2018-08-17 PROCEDURE — 63600175 PHARM REV CODE 636 W HCPCS: Performed by: STUDENT IN AN ORGANIZED HEALTH CARE EDUCATION/TRAINING PROGRAM

## 2018-08-17 PROCEDURE — 36415 COLL VENOUS BLD VENIPUNCTURE: CPT

## 2018-08-17 PROCEDURE — 84100 ASSAY OF PHOSPHORUS: CPT

## 2018-08-17 PROCEDURE — 86850 RBC ANTIBODY SCREEN: CPT

## 2018-08-17 PROCEDURE — 80100016 HC MAINTENANCE HEMODIALYSIS

## 2018-08-17 PROCEDURE — 85025 COMPLETE CBC W/AUTO DIFF WBC: CPT

## 2018-08-17 PROCEDURE — 80053 COMPREHEN METABOLIC PANEL: CPT

## 2018-08-17 RX ORDER — POLYETHYLENE GLYCOL 3350 17 G/17G
17 POWDER, FOR SOLUTION ORAL ONCE
Status: COMPLETED | OUTPATIENT
Start: 2018-08-17 | End: 2018-08-17

## 2018-08-17 RX ORDER — ONDANSETRON 8 MG/1
8 TABLET, ORALLY DISINTEGRATING ORAL EVERY 6 HOURS PRN
Status: DISCONTINUED | OUTPATIENT
Start: 2018-08-17 | End: 2018-08-19 | Stop reason: HOSPADM

## 2018-08-17 RX ORDER — NAPROXEN SODIUM 220 MG/1
81 TABLET, FILM COATED ORAL DAILY
Refills: 0 | COMMUNITY
Start: 2018-08-18 | End: 2019-08-18

## 2018-08-17 RX ORDER — PANTOPRAZOLE SODIUM 40 MG/1
40 TABLET, DELAYED RELEASE ORAL DAILY
Status: DISCONTINUED | OUTPATIENT
Start: 2018-08-17 | End: 2018-08-19 | Stop reason: HOSPADM

## 2018-08-17 RX ORDER — ONDANSETRON 2 MG/ML
4 INJECTION INTRAMUSCULAR; INTRAVENOUS EVERY 6 HOURS PRN
Status: DISCONTINUED | OUTPATIENT
Start: 2018-08-17 | End: 2018-08-19 | Stop reason: HOSPADM

## 2018-08-17 RX ORDER — POLYETHYLENE GLYCOL 3350 17 G/17G
17 POWDER, FOR SOLUTION ORAL DAILY
Status: DISCONTINUED | OUTPATIENT
Start: 2018-08-17 | End: 2018-08-18

## 2018-08-17 RX ORDER — SENNOSIDES 8.6 MG/1
8.6 TABLET ORAL DAILY
Status: DISCONTINUED | OUTPATIENT
Start: 2018-08-17 | End: 2018-08-18

## 2018-08-17 RX ORDER — MAG HYDROX/ALUMINUM HYD/SIMETH 200-200-20
30 SUSPENSION, ORAL (FINAL DOSE FORM) ORAL ONCE AS NEEDED
Status: ACTIVE | OUTPATIENT
Start: 2018-08-17 | End: 2018-08-17

## 2018-08-17 RX ADMIN — PANTOPRAZOLE SODIUM 40 MG: 40 TABLET, DELAYED RELEASE ORAL at 03:08

## 2018-08-17 RX ADMIN — DEXTROSE 8 MG/HR: 50 INJECTION, SOLUTION INTRAVENOUS at 01:08

## 2018-08-17 RX ADMIN — AMLODIPINE BESYLATE 10 MG: 5 TABLET ORAL at 04:08

## 2018-08-17 RX ADMIN — CARVEDILOL 3.12 MG: 3.12 TABLET, FILM COATED ORAL at 08:08

## 2018-08-17 RX ADMIN — LIDOCAINE 1 PATCH: 50 PATCH TOPICAL at 08:08

## 2018-08-17 RX ADMIN — POLYETHYLENE GLYCOL 3350 17 G: 17 POWDER, FOR SOLUTION ORAL at 03:08

## 2018-08-17 RX ADMIN — LIDOCAINE HYDROCHLORIDE: 20 SOLUTION ORAL; TOPICAL at 12:08

## 2018-08-17 RX ADMIN — FUROSEMIDE 120 MG: 40 TABLET ORAL at 08:08

## 2018-08-17 RX ADMIN — GUAIFENESIN 600 MG: 600 TABLET, EXTENDED RELEASE ORAL at 11:08

## 2018-08-17 RX ADMIN — RAMELTEON 8 MG: 8 TABLET, FILM COATED ORAL at 12:08

## 2018-08-17 RX ADMIN — TICAGRELOR 90 MG: 90 TABLET ORAL at 08:08

## 2018-08-17 RX ADMIN — CALCIUM ACETATE 1334 MG: 667 CAPSULE ORAL at 04:08

## 2018-08-17 RX ADMIN — GUAIFENESIN 600 MG: 600 TABLET, EXTENDED RELEASE ORAL at 08:08

## 2018-08-17 RX ADMIN — ASPIRIN 81 MG 81 MG: 81 TABLET ORAL at 11:08

## 2018-08-17 RX ADMIN — MINOXIDIL 2.5 MG: 2.5 TABLET ORAL at 08:08

## 2018-08-17 RX ADMIN — ONDANSETRON 4 MG: 2 INJECTION INTRAMUSCULAR; INTRAVENOUS at 03:08

## 2018-08-17 RX ADMIN — LEVOTHYROXINE SODIUM 50 MCG: 50 TABLET ORAL at 05:08

## 2018-08-17 RX ADMIN — TICAGRELOR 90 MG: 90 TABLET ORAL at 11:08

## 2018-08-17 RX ADMIN — SENNOSIDES 8.6 MG: 8.6 TABLET, FILM COATED ORAL at 11:08

## 2018-08-17 RX ADMIN — FLUTICASONE PROPIONATE 100 MCG: 50 SPRAY, METERED NASAL at 11:08

## 2018-08-17 RX ADMIN — DEXTROSE 8 MG/HR: 50 INJECTION, SOLUTION INTRAVENOUS at 05:08

## 2018-08-17 RX ADMIN — FLUTICASONE PROPIONATE 100 MCG: 50 SPRAY, METERED NASAL at 09:08

## 2018-08-17 NOTE — PLAN OF CARE
Problem: Patient Care Overview  Goal: Plan of Care Review  Outcome: Ongoing (interventions implemented as appropriate)  Plan of care reviewed w/pt.  Pt verbalized understanding.  Abdominal xray today.  Pt had dialysis today.  H&H low this afternoon; therefore, type and screen and prepare 1 unit of PRBCs ordered.  Blood glucose monitored and maintained per orders.  Fall/safety precautions maintained.  Bed in low position and locked.  Call light within reach.  Slip resistant socks on.  Bed alarm on.  Nurse instructed pt to notify staff for assistance.  Pt verbalized understanding.  Pt stable.  PIV clean, dry and intact.  No signs of distress noted.

## 2018-08-17 NOTE — PLAN OF CARE
Problem: Patient Care Overview  Goal: Plan of Care Review  Outcome: Ongoing (interventions implemented as appropriate)  Pt on RA with documented sats.99. Will continue to monitor.

## 2018-08-17 NOTE — PHYSICIAN QUERY
PT Name: Ki Roque  MR #: [id number]    Physician Query Form - Consultant Diagnosis Clarification     CDS/: Inocencio PADGETT,RN,CDI            Contact information:[phone]    This form is a permanent document in the medical record.     Query Date: August 17, 2018      By submitting this query, we are merely seeking further clarification of documentation.  Please utilize your independent clinical judgment when addressing the question(s) below.      The Medical record contains the following:   Diagnosis Supporting Clinical Information Location in Medical Record                            Malnutrition       ESRD        Malnutrition       -will order nepro with meals             General ROS: negative for chills, fever or weight       loss       JG=232xg 13.5 oz     HT=56     BMI=27.90       ESRD, Severe Anemia            WT= 172lb       HT= 5'6       BMI= 27.90              08/14/18 Nephrology Consults                       08/14/18 Gastroenterology                            08/15/18 Anesthesia Post         Evaluation         Do you agree with the Consultants diagnosis of ___Malnutrition________________________________?                 [ x  ]   Yes                          [   ]   No                         [   ]   Clinically undetermined                 [   ]   Other/Clarification of findings: ___________________________________________

## 2018-08-17 NOTE — PLAN OF CARE
TN went to meet with patient, no family at bedside. No anticipated discharge needs. Patient may possibly discharge today.    --Update: Patient is not discharging today. TN will follow-up with patient on Monday.     08/17/18 1253   Discharge Reassessment   Assessment Type Discharge Planning Reassessment   Provided patient/caregiver education on the expected discharge date and the discharge plan Yes   Do you have any problems affording any of your prescribed medications? TBD   Discharge Plan A Home with family   Discharge Plan B Home with family;Home Health   Patient choice form signed by patient/caregiver N/A   Can the patient answer the patient profile reliably? Yes, cognitively intact   Describe the patient's ability to walk at the present time. Minor restrictions or changes     Helene Theodore RN  Transition Navigator  [phone]

## 2018-08-17 NOTE — PLAN OF CARE
Problem: Hemodialysis (Adult)  Goal: Signs and Symptoms of Listed Potential Problems Will be Absent, Minimized or Managed (Hemodialysis)  Signs and symptoms of listed potential problems will be absent, minimized or managed by discharge/transition of care (reference Hemodialysis (Adult) CPG).   08/17/18 1155   Hemodialysis   Problems Assessed (Hemodialysis) electrolyte imbalance;fluid imbalance   Problems Present (Hemodialysis) electrolyte imbalance;fluid imbalance   HD with UF today

## 2018-08-17 NOTE — PLAN OF CARE
Problem: Patient Care Overview  Goal: Plan of Care Review  Outcome: Ongoing (interventions implemented as appropriate)  The pt has slept off and on.  He has had complaints of abdominal pain stating that he is feeling bloated.  The GI cocktail worked best for his complaints.  Blood glucose levels have been monitored and did not require coverage. He has had no falls.  No active bleeding noted.

## 2018-08-17 NOTE — PROGRESS NOTES
PGY-2 Progress Note [facility]  Follow up for: GI Bleed    Hospital Stay Day 4    Subjective: Afebrile, BP intermittently elevated, denies bowel movement.  Endorses continuation of abdominal distention, states this is similar to when he's had fluid on him before.    Denies any CP, SOB, N/V/D, headaches, fever or chills.     Scheduled Meds:   amLODIPine  10 mg Oral Daily    aspirin  81 mg Oral Daily    calcitRIOL  0.25 mcg Oral Daily    calcium acetate  1,334 mg Oral TID WM    carvedilol  3.125 mg Oral BID    fluticasone  2 spray Each Nare BID    furosemide  120 mg Oral BID    guaiFENesin  600 mg Oral BID    levothyroxine  50 mcg Oral Before breakfast    lidocaine  1 patch Transdermal Q24H    metoclopramide HCl  10 mg Intravenous Once    metOLazone  5 mg Oral Daily    minoxidil  2.5 mg Oral BID    ondansetron  4 mg Intravenous Once    ticagrelor  90 mg Oral BID    vitamin renal formula (B-complex-vitamin c-folic acid)  1 capsule Oral Daily     Continuous Infusions:   pantoprazole 40 mg in dextrose 5 % 100 mL infusion (ready to mix system) 8 mg/hr (18 0556)     PRN Meds:sodium chloride, sodium chloride, acetaminophen, acetaminophen, aluminum-magnesium hydroxide-simethicone, dextrose 50%, dextrose 50%, fluticasone, glucagon (human recombinant), glucose, glucose, hydrALAZINE, insulin aspart U-100, ramelteon, senna, simethicone, sodium chloride 0.9%, traMADol    Review of patient's allergies indicates:  No Known Allergies    Objectives:     Vitals(Most Recent)      BP  Min: 143/81  Max: 192/84  Temp  Av.1 °F (36.7 °C)  Min: 97.4 °F (36.3 °C)  Max: 98.8 °F (37.1 °C)  Pulse  Av.3  Min: 68  Max: 93  Resp  Av.3  Min: 10  Max: 20  SpO2  Av.8 %  Min: 97 %  Max: 100 %             Vitals([id number])  Temp:  [97.4 °F (36.3 °C)-98.8 °F (37.1 °C)]   Pulse:  [68-93]   Resp:  [10-20]   BP: (143-192)/(65-92)   SpO2:  [97 %-100 %]     I & O([id number])    Intake/Output Summary (Last 24 hours) at  8/17/2018 0714  Last data filed at 8/17/2018 0032  Gross per 24 hour   Intake 570 ml   Output 400 ml   Net 170 ml     General: AAOx3. NAD. Appears uncomfortable  HEENT: NCAT. PERRLA.  Neck: Supple.   CV: RRR, no murmur   Chest: CTAB  Abd: +BS x 4. Soft. Distended, non- tender   Ext: trace edema  Skin: Intact. No rash. No lesions.   Neuro: CN II-XII intact. No focal deficit. Strength 5/5 throughout. Sensation intact.   Psych: Good judgement and reason. No A/V hallucinations. NL affect. No abnormal behaviors noted     LABS  CBC  Recent Labs   Lab  08/14/18   0517   08/15/18   0535   08/16/18   0534  08/16/18   1924  08/16/18   2337   WBC  4.70   --   5.27   --   6.46   --    --    RBC  2.77*   --   2.51*   --   2.94*   --    --    HGB  7.7*  7.4*   < >  7.1*  6.9*   < >  8.4*  9.3*  8.5*   HCT  22.6*  21.7*   < >  20.7*  20.9*   < >  24.3*  28.4*  24.9*   PLT  90*   --   127*   --   137*   --    --    MCV  82   --   83   --   83   --    --    MCH  27.8   --   28.3   --   28.6   --    --    MCHC  34.1   --   34.3   --   34.6   --    --     < > = values in this interval not displayed.     BMP  Recent Labs   Lab  08/14/18   0517  08/15/18   0535  08/16/18   0534   NA  137  134*  135*   K  4.4  4.4  5.7*   CO2  25  25  20*   CL  101  100  101   BUN  103*  86*  113*   CREATININE  11.8*  11.3*  13.1*   GLU  115*  114*  93       POCT-Glucose  POCT Glucose   Date Value Ref Range Status   08/17/2018 106 70 - 110 mg/dL Final   08/16/2018 97 70 - 110 mg/dL Final   08/16/2018 82 70 - 110 mg/dL Final   08/16/2018 93 70 - 110 mg/dL Final   08/16/2018 91 70 - 110 mg/dL Final   08/15/2018 120 (H) 70 - 110 mg/dL Final   08/15/2018 118 (H) 70 - 110 mg/dL Final   08/15/2018 113 (H) 70 - 110 mg/dL Final   08/14/2018 147 (H) 70 - 110 mg/dL Final   08/14/2018 151 (H) 70 - 110 mg/dL Final   08/14/2018 113 (H) 70 - 110 mg/dL Final       Recent Labs   Lab  08/14/18   0517  08/15/18   0535  08/16/18   0534   CALCIUM  8.8  9.0  9.5   MG   2.0  2.0  2.3   PHOS  4.3  5.0*  6.3*     LFT  Recent Labs   Lab  08/14/18   0517  08/15/18   0535  08/16/18   0534   PROT  5.6*  5.5*  6.1   ALBUMIN  2.9*  3.0*  3.1*   BILITOT  0.5  0.4  0.6   AST  18  17  30   ALKPHOS  34*  32*  35*   ALT  13  9*  14     LAST HbA1c  Lab Results   Component Value Date    HGBA1C 6.2 (H) 08/13/2018           Imaging-  No new imaging    Assessment/Plan:   Patient is a 68 y.o. AA male with hx of ESRD (HD MTTF), HTN, CAD s/p stenting (on 8/6/18RCA on brillinta) and previous GI bleeds 2/2 small bowel  Transferred from OSH for GI work up and evaluation for GI bleed. S/p double balloon upper endoscopy.       GI Bleed  -  At OSH, EGD per general surgery revealed gastritis, duodenitis and multiple small punctate ulcers with no active bleeding  - Underwent capsule study with GI  - EGD performed 8/15- no active bleeding seen  - transfused 2 units pRBC  due to low H/H on 8/15  - trending H/H, stable at this time   - Patient had upper ballon endoscopy yesterday- active bleed in proximal duodenum s/p 3 clips     HTN  - continue amlodipine 10mg  - continue carvedilol 3.125mg     CAD s/p recent stent placement   - continue asa and brilinta     ESRD  - received HD on MTThF  - nephro consulted and following  - Will have HD tooday     T2DM  - A1c 6.2  - POCT glucose QID  - SSI        Code: full  Diet: renal diet  Dispo: f/u abdo xray and likely will be discharged home today      Case discussed with staff    Katlyn Boss MD  [facility] HO2  08/17/2018

## 2018-08-17 NOTE — PLAN OF CARE
08/17/18 1620   Medicare Message   Important Message from Medicare regarding Discharge Appeal Rights Given to patient/caregiver;Explained to patient/caregiver;Signed/date by patient/caregiver   Date IMM was signed 08/17/18   Time IMM was signed [id number]

## 2018-08-18 LAB
ALBUMIN SERPL BCP-MCNC: 2.8 G/DL
ALP SERPL-CCNC: 41 U/L
ALT SERPL W/O P-5'-P-CCNC: 15 U/L
ANION GAP SERPL CALC-SCNC: 7 MMOL/L
AST SERPL-CCNC: 25 U/L
BASOPHILS # BLD AUTO: 0.01 K/UL
BASOPHILS NFR BLD: 0.2 %
BILIRUB SERPL-MCNC: 0.4 MG/DL
BLD PROD TYP BPU: NORMAL
BLOOD UNIT EXPIRATION DATE: NORMAL
BLOOD UNIT TYPE CODE: 6200
BLOOD UNIT TYPE: NORMAL
BUN SERPL-MCNC: 56 MG/DL
CALCIUM SERPL-MCNC: 8.9 MG/DL
CHLORIDE SERPL-SCNC: 101 MMOL/L
CO2 SERPL-SCNC: 26 MMOL/L
CODING SYSTEM: NORMAL
CREAT SERPL-MCNC: 8.2 MG/DL
DIFFERENTIAL METHOD: ABNORMAL
DISPENSE STATUS: NORMAL
EOSINOPHIL # BLD AUTO: 0.2 K/UL
EOSINOPHIL NFR BLD: 5.3 %
ERYTHROCYTE [DISTWIDTH] IN BLOOD BY AUTOMATED COUNT: 16.5 %
EST. GFR  (AFRICAN AMERICAN): 7 ML/MIN/1.73 M^2
EST. GFR  (NON AFRICAN AMERICAN): 6 ML/MIN/1.73 M^2
GLUCOSE SERPL-MCNC: 138 MG/DL
HCT VFR BLD AUTO: 18.9 %
HCT VFR BLD AUTO: 20.9 %
HCT VFR BLD AUTO: 22.5 %
HCT VFR BLD AUTO: 22.9 %
HCT VFR BLD AUTO: 23.2 %
HCT VFR BLD AUTO: 24.6 %
HGB BLD-MCNC: 6.4 G/DL
HGB BLD-MCNC: 7.1 G/DL
HGB BLD-MCNC: 7.7 G/DL
HGB BLD-MCNC: 7.8 G/DL
HGB BLD-MCNC: 7.9 G/DL
HGB BLD-MCNC: 8.2 G/DL
LYMPHOCYTES # BLD AUTO: 0.7 K/UL
LYMPHOCYTES NFR BLD: 16.1 %
MAGNESIUM SERPL-MCNC: 1.9 MG/DL
MCH RBC QN AUTO: 28.7 PG
MCHC RBC AUTO-ENTMCNC: 33.9 G/DL
MCV RBC AUTO: 85 FL
MONOCYTES # BLD AUTO: 0.2 K/UL
MONOCYTES NFR BLD: 4.6 %
NEUTROPHILS # BLD AUTO: 3.2 K/UL
NEUTROPHILS NFR BLD: 73.3 %
PHOSPHATE SERPL-MCNC: 3.9 MG/DL
PLATELET # BLD AUTO: 117 K/UL
PMV BLD AUTO: 10.1 FL
POCT GLUCOSE: 109 MG/DL (ref 70–110)
POCT GLUCOSE: 124 MG/DL (ref 70–110)
POCT GLUCOSE: 151 MG/DL (ref 70–110)
POCT GLUCOSE: 153 MG/DL (ref 70–110)
POTASSIUM SERPL-SCNC: 4.3 MMOL/L
PROT SERPL-MCNC: 5.4 G/DL
RBC # BLD AUTO: 2.23 M/UL
SODIUM SERPL-SCNC: 134 MMOL/L
TRANS ERYTHROCYTES VOL PATIENT: NORMAL ML
WBC # BLD AUTO: 4.36 K/UL

## 2018-08-18 PROCEDURE — 84100 ASSAY OF PHOSPHORUS: CPT

## 2018-08-18 PROCEDURE — 80053 COMPREHEN METABOLIC PANEL: CPT

## 2018-08-18 PROCEDURE — 25000003 PHARM REV CODE 250: Performed by: STUDENT IN AN ORGANIZED HEALTH CARE EDUCATION/TRAINING PROGRAM

## 2018-08-18 PROCEDURE — 83735 ASSAY OF MAGNESIUM: CPT

## 2018-08-18 PROCEDURE — 25000003 PHARM REV CODE 250: Performed by: INTERNAL MEDICINE

## 2018-08-18 PROCEDURE — 85018 HEMOGLOBIN: CPT | Mod: 91

## 2018-08-18 PROCEDURE — 36415 COLL VENOUS BLD VENIPUNCTURE: CPT

## 2018-08-18 PROCEDURE — 94761 N-INVAS EAR/PLS OXIMETRY MLT: CPT

## 2018-08-18 PROCEDURE — 85014 HEMATOCRIT: CPT | Mod: 91

## 2018-08-18 PROCEDURE — P9021 RED BLOOD CELLS UNIT: HCPCS

## 2018-08-18 PROCEDURE — 85025 COMPLETE CBC W/AUTO DIFF WBC: CPT

## 2018-08-18 PROCEDURE — 12000002 HC ACUTE/MED SURGE SEMI-PRIVATE ROOM

## 2018-08-18 PROCEDURE — 36430 TRANSFUSION BLD/BLD COMPNT: CPT

## 2018-08-18 RX ORDER — AMOXICILLIN 250 MG
2 CAPSULE ORAL ONCE
Status: COMPLETED | OUTPATIENT
Start: 2018-08-18 | End: 2018-08-18

## 2018-08-18 RX ORDER — HYDROCODONE BITARTRATE AND ACETAMINOPHEN 500; 5 MG/1; MG/1
TABLET ORAL
Status: DISCONTINUED | OUTPATIENT
Start: 2018-08-18 | End: 2018-08-19 | Stop reason: HOSPADM

## 2018-08-18 RX ORDER — POLYETHYLENE GLYCOL 3350 17 G/17G
17 POWDER, FOR SOLUTION ORAL ONCE
Status: DISCONTINUED | OUTPATIENT
Start: 2018-08-18 | End: 2018-08-18

## 2018-08-18 RX ORDER — BISACODYL 5 MG
10 TABLET, DELAYED RELEASE (ENTERIC COATED) ORAL
Status: DISCONTINUED | OUTPATIENT
Start: 2018-08-18 | End: 2018-08-19 | Stop reason: HOSPADM

## 2018-08-18 RX ORDER — POLYETHYLENE GLYCOL 3350 17 G/17G
17 POWDER, FOR SOLUTION ORAL 2 TIMES DAILY
Status: DISCONTINUED | OUTPATIENT
Start: 2018-08-18 | End: 2018-08-19 | Stop reason: HOSPADM

## 2018-08-18 RX ADMIN — LEVOTHYROXINE SODIUM 50 MCG: 50 TABLET ORAL at 05:08

## 2018-08-18 RX ADMIN — FLUTICASONE PROPIONATE 100 MCG: 50 SPRAY, METERED NASAL at 09:08

## 2018-08-18 RX ADMIN — MINOXIDIL 2.5 MG: 2.5 TABLET ORAL at 08:08

## 2018-08-18 RX ADMIN — LIDOCAINE 1 PATCH: 50 PATCH TOPICAL at 08:08

## 2018-08-18 RX ADMIN — ASPIRIN 81 MG 81 MG: 81 TABLET ORAL at 09:08

## 2018-08-18 RX ADMIN — POLYETHYLENE GLYCOL 3350 17 G: 17 POWDER, FOR SOLUTION ORAL at 08:08

## 2018-08-18 RX ADMIN — CALCIUM ACETATE 1334 MG: 667 CAPSULE ORAL at 09:08

## 2018-08-18 RX ADMIN — AMLODIPINE BESYLATE 10 MG: 5 TABLET ORAL at 09:08

## 2018-08-18 RX ADMIN — GUAIFENESIN 600 MG: 600 TABLET, EXTENDED RELEASE ORAL at 08:08

## 2018-08-18 RX ADMIN — METOLAZONE 5 MG: 5 TABLET ORAL at 09:08

## 2018-08-18 RX ADMIN — FUROSEMIDE 120 MG: 40 TABLET ORAL at 08:08

## 2018-08-18 RX ADMIN — FUROSEMIDE 120 MG: 40 TABLET ORAL at 09:08

## 2018-08-18 RX ADMIN — POLYETHYLENE GLYCOL 3350 17 G: 17 POWDER, FOR SOLUTION ORAL at 09:08

## 2018-08-18 RX ADMIN — Medication 1 CAPSULE: at 09:08

## 2018-08-18 RX ADMIN — DOCUSATE SODIUM AND SENNOSIDES 2 TABLET: 8.6; 5 TABLET, FILM COATED ORAL at 09:08

## 2018-08-18 RX ADMIN — TICAGRELOR 90 MG: 90 TABLET ORAL at 09:08

## 2018-08-18 RX ADMIN — PANTOPRAZOLE SODIUM 40 MG: 40 TABLET, DELAYED RELEASE ORAL at 09:08

## 2018-08-18 RX ADMIN — MINOXIDIL 2.5 MG: 2.5 TABLET ORAL at 09:08

## 2018-08-18 RX ADMIN — GUAIFENESIN 600 MG: 600 TABLET, EXTENDED RELEASE ORAL at 09:08

## 2018-08-18 RX ADMIN — CARVEDILOL 3.12 MG: 3.12 TABLET, FILM COATED ORAL at 09:08

## 2018-08-18 RX ADMIN — CALCITRIOL 0.25 MCG: 0.25 CAPSULE, LIQUID FILLED ORAL at 09:08

## 2018-08-18 RX ADMIN — TICAGRELOR 90 MG: 90 TABLET ORAL at 08:08

## 2018-08-18 RX ADMIN — CARVEDILOL 3.12 MG: 3.12 TABLET, FILM COATED ORAL at 08:08

## 2018-08-18 RX ADMIN — CALCIUM ACETATE 1334 MG: 667 CAPSULE ORAL at 12:08

## 2018-08-18 RX ADMIN — CALCIUM ACETATE 1334 MG: 667 CAPSULE ORAL at 05:08

## 2018-08-18 NOTE — PLAN OF CARE
Problem: Patient Care Overview  Goal: Plan of Care Review  Outcome: Ongoing (interventions implemented as appropriate)  Plan of care reviewed with patient, understanding verbalized.  Pt receiving 1 unit blood, transfusion started at 0428 .  H/H monitored every 6 hours.  Bed alarm on, call light in reach, fall precautions in place.  Will continue to monitor and report to oncoming nurse.

## 2018-08-18 NOTE — PROGRESS NOTES
[facility] Hospital Medicine  Progress Note    Patient Name: Ki Roque  MRN: [id number]  Patient Class: IP- Inpatient   Admission Date: 8/13/2018  Length of Stay: 5 days  Attending Physician: Dr. Roya Juarez  Primary Care Provider: To Obtain Unable        Subjective:     Principal Problem:GI bleed    Interval History: The patient said he feels fine, denies any acute bleeding. He has not have a bowel movement yet and feels abdomen is pretty tight. He does't think miralax alone is working for him.     Review of Systems   Constitutional: Negative for activity change, chills, fatigue and fever.   HENT: Negative for hearing loss.    Eyes: Negative for visual disturbance.   Respiratory: Negative for cough, chest tightness and shortness of breath.    Cardiovascular: Negative for chest pain, palpitations and leg swelling.   Gastrointestinal: Positive for abdominal distention, abdominal pain and constipation. Negative for anal bleeding, blood in stool, diarrhea, nausea and vomiting.   Genitourinary: Negative for dysuria.   Musculoskeletal: Negative for arthralgias.   Skin: Negative for wound.   Neurological: Negative for headaches.        Objective:     Vital Signs (Most Recent):  Temp: 98.2 °F (36.8 °C) (08/18/18 0811)  Pulse: 81 (08/18/18 0811)  Resp: 20 (08/18/18 0811)  BP: (!) 175/67 (08/18/18 0811)  SpO2: 99 % (08/18/18 0814) Vital Signs (24h Range):  Temp:  [97 °F (36.1 °C)-99.1 °F (37.3 °C)] 98.2 °F (36.8 °C)  Pulse:  [65-93] 81  Resp:  [14-20] 20  SpO2:  [98 %-100 %] 99 %  BP: (101-190)/(64-93) 175/67     Weight: 83 kg (182 lb 15.7 oz)  Body mass index is 29.53 kg/m².    Intake/Output Summary (Last 24 hours) at 8/18/2018 0825  Last data filed at 8/17/2018 1420  Gross per 24 hour   Intake 400 ml   Output 400 ml   Net 0 ml      Physical Exam   Constitutional: He is oriented to person, place, and time. He appears well-developed and well-nourished. No distress.   HENT:   Head: Normocephalic and  atraumatic.   Nose: Nose normal.   Eyes: Conjunctivae and EOM are normal. No scleral icterus.   IV line in place.   Neck: Normal range of motion. Neck supple.   Cardiovascular: Normal rate, regular rhythm, normal heart sounds and intact distal pulses. Exam reveals no gallop and no friction rub.   No murmur heard.  Pulmonary/Chest: Effort normal and breath sounds normal. No respiratory distress. He has no wheezes. He has no rales. He exhibits no tenderness.   Abdominal: Bowel sounds are normal. He exhibits distension. He exhibits no mass. There is no tenderness. There is no rebound and no guarding.   Abdomen tight   Musculoskeletal: Normal range of motion. He exhibits no edema, tenderness or deformity.   Neurological: He is alert and oriented to person, place, and time.   Skin: Skin is warm. Capillary refill takes less than 2 seconds. He is not diaphoretic.   Psychiatric: He has a normal mood and affect. His behavior is normal. Judgment and thought content normal.       Recent Labs      08/16/18 0534   08/17/18 0720 08/18/18   0322   WBC  6.46   --   7.83   --   4.36   HGB  8.4*   < >  8.2*   < >  6.4*   HCT  24.3*   < >  24.0*   < >  18.9*   PLT  137*   --   143*   --   117*   MCV  83   --   85   --   85   RDW  16.3*   --   16.6*   --   16.5*    < > = values in this interval not displayed.       Recent Labs      08/16/18 0534  08/17/18 0720 08/18/18   0322   NA  135*  133*  134*   K  5.7*  4.5  4.3   CL  101  99  101   CO2  20*  22*  26   GLU  93  111*  138*   BUN  113*  84*  56*   CREATININE  13.1*  10.8*  8.2*   CALCIUM  9.5  9.1  8.9   PROT  6.1  6.1  5.4*   ALBUMIN  3.1*  3.1*  2.8*   BILITOT  0.6  0.5  0.4   ALKPHOS  35*  41*  41*   AST  30  25  25   ALT  14  13  15   ANIONGAP  14  12  7*   ESTGFRAFRICA  4*  5*  7*   EGFRNONAA  3*  4*  6*       Recent Labs      08/16/18   0534  08/17/18   0720  08/18/18   0322   MG  2.3  2.0  1.9   PHOS  6.3*  4.5  3.9       Coags  No results found for: INR, PROTIME,  APTT  No results for input(s): PT, INR, APTT in the last 168 hours.    A1c:   Lab Results   Component Value Date    HGBA1C 6.2 (H) 08/13/2018   , Last Gluc:   Recent Labs   Lab  08/16/18 2023 08/17/18   0615  08/17/18   1122  08/17/18   1554  08/17/18   2016  08/18/18   0412   POCTGLUCOSE  97  106  117*  131*  161*  153*       TSH:   Lab Results   Component Value Date    TSH 2.486 08/13/2018         Cardiac Enzymes  No results for input(s): TROPONINI, CPKMB, CPK in the last 72 hours.      Urinalysis  Urinalysis  No results for input(s): COLORU, CLARITYU, SPECGRAV, PHUR, PROTEINUA, GLUCOSEU, BILIRUBINCON, BLOODU, WBCU, RBCU, BACTERIA, MUCUS, NITRITE, LEUKOCYTESUR, UROBILINOGEN, HYALINECASTS in the last 24 hours.  No results for input(s): COLORU, CLARITYU, SPECGRAV, PHUR, PROTEINUA, GLUCOSEU, BLOODU, WBCU, RBCU, BACTERIA, MUCUS in the last 24 hours.    Invalid input(s):  BILIRUBINCON    Micro  Microbiology Results (last 7 days)     ** No results found for the last 168 hours. **             ABG  No results for input(s): PH, PCO2, PO2, HCO3, POCSATURATED, BE in the last 168 hours.      Imaging     KUB shows plenty of stools.    Assessment/Plan:    Patient is a 68 y.o. AA male with hx of ESRD (HD MTTF), HTN, CAD s/p stenting (on 8/6/18RCA on brillinta) and previous GI bleeds 2/2 small bowel  Transferred from OSH for GI work up and evaluation for GI bleed. S/p double balloon upper endoscopy.       GI Bleed  -  At OSH, EGD per general surgery revealed gastritis, duodenitis and multiple small punctate ulcers with no active bleeding  - Underwent capsule study with GI  - EGD performed 8/15- no active bleeding seen  - transfused 2 units pRBC  due to low H/H on 8/15  - trending H/H, stable at this time   - Patient had upper ballon endoscopy two days ago- active bleed in proximal duodenum s/p 3 clips  - resolved. H/H dropped 1 unit. He's getting one unit of transfusion, consented, and prepped for extra 2units. Will trend  H/H     HTN  - continue amlodipine 10mg  - continue carvedilol 3.125mg  - hydralazine prn     CAD s/p recent stent placement   - continue asa and brilinta     ESRD  - received HD on MTThF  - nephro consulted and following  - Had HD on Friday.     T2DM  - A1c 6.2  - POCT glucose QID  - SSI     Constipation  Showed on KUB  Has not had a bm for 2 days and feels constipated  Will add docusat as he said miralax and senna didn't work.  Will f/u       Code: full  Diet: renal diet  Dispo:trending H/H and bowel movement   Case discussed with staff      Active Diagnoses:    Diagnosis Date Noted POA    CAD (coronary artery disease) [I25.10] 08/13/2018 Yes    ESRD (end stage renal disease) [N18.6] 08/13/2018 Yes    Iron deficiency anemia due to chronic blood loss [D50.0] 01/22/2018 Yes    Angiodysplasia [K55.20] 12/14/2017 Yes      Problems Resolved During this Admission:    Diagnosis Date Noted Date Resolved POA    PRINCIPAL PROBLEM:  GI bleed [K92.2] 08/13/2018 08/17/2018 Yes     VTE Risk Mitigation (From admission, onward)        Ordered     Place sequential compression device  Until discontinued      08/13/18 1830     IP VTE HIGH RISK PATIENT  Once      08/13/18 1830             Stormy Broussard MD  Department of Hospital Medicine   [facility]

## 2018-08-18 NOTE — PROGRESS NOTES
"[facility]    CC: anemia    HPI 68 y.o. male with pmh ESRD, CAD (s/p PCI on 8/6/18, now on DAPT) presents for recurrent, severe, normocytic anemia not associated with melena. He reports melena for the last week prior to admission at OSH. At that time he had just had a negative video capsule repeated on 7/2018 and he was being set up for sandostatin injections outpatient for presumed small bowel angioectasias. However, prior to getting it set up, he was admitted to an OSH for severe anemia and melena. He required 9 units of blood and EGD there at the OSH showed gastritis and small non-bleeding ulcers. Patient underwent SBE on 8/16 which resulted in small bowel dieulafoy lesion identified and treated with APC and 3 hemoclips. Patient tolerated procedure with stable H/H until today. Hgb down to 6.4, however patient denied any blood in stool. He had bowel movement this morning and reports feeling like he has more stool to come. He denies lightheadedness, dizziness and abdominal pain.      Past Medical History    has a past medical history of Diabetes mellitus, Encounter for blood transfusion, Hemodialysis access, AV graft, Hypertension, and Thyroid disease.      Review of Systems  General ROS: negative for - chills, fever or weight loss  Cardiovascular ROS: no chest pain or dyspnea on exertion  Gastrointestinal ROS: no abdominal pain, change in bowel habits, or black/ bloody stools    Physical Examination  BP (!) 175/67   Pulse 81   Temp 98.2 °F (36.8 °C)   Resp 20   Ht 5' 6" (1.676 m)   Wt 83 kg (182 lb 15.7 oz)   SpO2 99%   BMI 29.53 kg/m²   General appearance: alert, cooperative, no distress  HENT: Normocephalic, atraumatic, neck symmetrical, no nasal discharge   Lungs: clear to auscultation in all fields, symmetric chest wall expansion bilaterally, no wheeze, rale, or rhonchi  Heart: normal rate, regular rhythm without rub; palpable peripheral pulsesI   Abdomen: soft, non-tender; bowel sounds " normoactive; no organomegaly  Extremities: extremities symmetric; no clubbing, cyanosis, or edema  Neurologic: Alert and oriented X 3, normal strength, normal coordination    Labs:  Recent Results (from the past 336 hour(s))   CBC auto differential    Collection Time: 08/18/18  3:22 AM   Result Value Ref Range    WBC 4.36 3.90 - 12.70 K/uL    Hemoglobin 6.4 (L) 14.0 - 18.0 g/dL    Hematocrit 18.9 (LL) 40.0 - 54.0 %    Platelets 117 (L) 150 - 350 K/uL   CBC auto differential    Collection Time: 08/17/18  7:20 AM   Result Value Ref Range    WBC 7.83 3.90 - 12.70 K/uL    Hemoglobin 8.2 (L) 14.0 - 18.0 g/dL    Hematocrit 24.0 (L) 40.0 - 54.0 %    Platelets 143 (L) 150 - 350 K/uL   CBC auto differential    Collection Time: 08/16/18  5:34 AM   Result Value Ref Range    WBC 6.46 3.90 - 12.70 K/uL    Hemoglobin 8.4 (L) 14.0 - 18.0 g/dL    Hematocrit 24.3 (L) 40.0 - 54.0 %    Platelets 137 (L) 150 - 350 K/uL         Imaging:  KUB --   Several metallic bodies project over the central abdomen, uncertain if external or intra-abdominal.  Bowel gas pattern is non-obstructive.  Stool noted throughout the ascending and transverse colon.  No evidence for pneumoperitoneum.  Degenerative changes of the lumbar spine noted.        I personally reviewed imaging    Medical records reviewed    Assessment:   68 yoM with pmh ESRD, CAD with recent PCI presents for small bowel bleed from dieulafoy lesion. Lesion treated with APC and hemoclips, now with slight drop in Hgb overnight. Patient without black tarry stool, notes constipation until this morning. KUB confirms large amount of stool in colon, will increase bowel regimen.    Plan:  - begin bisacodyl 10mg PO QOD as needed for constipation  - increase miralax to 17g bid  - monitor H/H, maintain hgb >7  - monitor stool for color      Arnie Felix MD  [facility], PGY V  [phone]

## 2018-08-19 VITALS
SYSTOLIC BLOOD PRESSURE: 171 MMHG | OXYGEN SATURATION: 99 % | BODY MASS INDEX: 29.41 KG/M2 | RESPIRATION RATE: 18 BRPM | DIASTOLIC BLOOD PRESSURE: 74 MMHG | WEIGHT: 183 LBS | HEART RATE: 73 BPM | TEMPERATURE: 98 F | HEIGHT: 66 IN

## 2018-08-19 LAB
BASOPHILS # BLD AUTO: 0.02 K/UL
BASOPHILS # BLD AUTO: 0.13 K/UL
BASOPHILS NFR BLD: 0.4 %
BASOPHILS NFR BLD: 2.2 %
DIFFERENTIAL METHOD: ABNORMAL
DIFFERENTIAL METHOD: ABNORMAL
EOSINOPHIL # BLD AUTO: 0.3 K/UL
EOSINOPHIL # BLD AUTO: 0.3 K/UL
EOSINOPHIL NFR BLD: 4.6 %
EOSINOPHIL NFR BLD: 4.9 %
ERYTHROCYTE [DISTWIDTH] IN BLOOD BY AUTOMATED COUNT: 16.3 %
ERYTHROCYTE [DISTWIDTH] IN BLOOD BY AUTOMATED COUNT: 16.5 %
HCT VFR BLD AUTO: 21.7 %
HCT VFR BLD AUTO: 21.9 %
HCT VFR BLD AUTO: 22.1 %
HCT VFR BLD AUTO: 22.1 %
HGB BLD-MCNC: 7.4 G/DL
HGB BLD-MCNC: 7.6 G/DL
LYMPHOCYTES # BLD AUTO: 0.5 K/UL
LYMPHOCYTES # BLD AUTO: 0.6 K/UL
LYMPHOCYTES NFR BLD: 10.5 %
LYMPHOCYTES NFR BLD: 8.1 %
MAGNESIUM SERPL-MCNC: 2 MG/DL
MCH RBC QN AUTO: 28.6 PG
MCH RBC QN AUTO: 29 PG
MCHC RBC AUTO-ENTMCNC: 33.8 G/DL
MCHC RBC AUTO-ENTMCNC: 34.4 G/DL
MCV RBC AUTO: 84 FL
MCV RBC AUTO: 85 FL
MONOCYTES # BLD AUTO: 0.5 K/UL
MONOCYTES # BLD AUTO: 0.7 K/UL
MONOCYTES NFR BLD: 11.9 %
MONOCYTES NFR BLD: 7.6 %
NEUTROPHILS # BLD AUTO: 4.1 K/UL
NEUTROPHILS # BLD AUTO: 4.3 K/UL
NEUTROPHILS NFR BLD: 72.1 %
NEUTROPHILS NFR BLD: 72.3 %
PHOSPHATE SERPL-MCNC: 4.7 MG/DL
PLATELET # BLD AUTO: 153 K/UL
PLATELET # BLD AUTO: 165 K/UL
PMV BLD AUTO: 10.3 FL
PMV BLD AUTO: 10.6 FL
POCT GLUCOSE: 172 MG/DL (ref 70–110)
POCT GLUCOSE: 174 MG/DL (ref 70–110)
POCT GLUCOSE: 77 MG/DL (ref 70–110)
RBC # BLD AUTO: 2.59 M/UL
RBC # BLD AUTO: 2.62 M/UL
WBC # BLD AUTO: 5.71 K/UL
WBC # BLD AUTO: 5.89 K/UL

## 2018-08-19 PROCEDURE — 25000003 PHARM REV CODE 250: Performed by: STUDENT IN AN ORGANIZED HEALTH CARE EDUCATION/TRAINING PROGRAM

## 2018-08-19 PROCEDURE — 83735 ASSAY OF MAGNESIUM: CPT

## 2018-08-19 PROCEDURE — 25000003 PHARM REV CODE 250: Performed by: INTERNAL MEDICINE

## 2018-08-19 PROCEDURE — 84100 ASSAY OF PHOSPHORUS: CPT

## 2018-08-19 PROCEDURE — 85018 HEMOGLOBIN: CPT

## 2018-08-19 PROCEDURE — 80053 COMPREHEN METABOLIC PANEL: CPT

## 2018-08-19 PROCEDURE — 36415 COLL VENOUS BLD VENIPUNCTURE: CPT

## 2018-08-19 PROCEDURE — 94761 N-INVAS EAR/PLS OXIMETRY MLT: CPT

## 2018-08-19 PROCEDURE — 85014 HEMATOCRIT: CPT

## 2018-08-19 PROCEDURE — 85025 COMPLETE CBC W/AUTO DIFF WBC: CPT

## 2018-08-19 RX ORDER — PANTOPRAZOLE SODIUM 40 MG/1
40 TABLET, DELAYED RELEASE ORAL DAILY
Qty: 30 TABLET | Refills: 11 | Status: SHIPPED | OUTPATIENT
Start: 2018-08-20 | End: 2019-08-20

## 2018-08-19 RX ORDER — POLYETHYLENE GLYCOL 3350 17 G/17G
17 POWDER, FOR SOLUTION ORAL 2 TIMES DAILY
Qty: 100 PACKET | Refills: 0 | Status: SHIPPED | OUTPATIENT
Start: 2018-08-19

## 2018-08-19 RX ADMIN — FUROSEMIDE 120 MG: 40 TABLET ORAL at 10:08

## 2018-08-19 RX ADMIN — METOLAZONE 5 MG: 5 TABLET ORAL at 10:08

## 2018-08-19 RX ADMIN — ASPIRIN 81 MG 81 MG: 81 TABLET ORAL at 10:08

## 2018-08-19 RX ADMIN — CALCITRIOL 0.25 MCG: 0.25 CAPSULE, LIQUID FILLED ORAL at 10:08

## 2018-08-19 RX ADMIN — MINOXIDIL 2.5 MG: 2.5 TABLET ORAL at 10:08

## 2018-08-19 RX ADMIN — POLYETHYLENE GLYCOL 3350 17 G: 17 POWDER, FOR SOLUTION ORAL at 10:08

## 2018-08-19 RX ADMIN — BISACODYL 10 MG: 5 TABLET, DELAYED RELEASE ORAL at 01:08

## 2018-08-19 RX ADMIN — GUAIFENESIN 600 MG: 600 TABLET, EXTENDED RELEASE ORAL at 10:08

## 2018-08-19 RX ADMIN — CALCIUM ACETATE 1334 MG: 667 CAPSULE ORAL at 04:08

## 2018-08-19 RX ADMIN — CARVEDILOL 3.12 MG: 3.12 TABLET, FILM COATED ORAL at 10:08

## 2018-08-19 RX ADMIN — Medication 1 CAPSULE: at 10:08

## 2018-08-19 RX ADMIN — LEVOTHYROXINE SODIUM 50 MCG: 50 TABLET ORAL at 05:08

## 2018-08-19 RX ADMIN — TICAGRELOR 90 MG: 90 TABLET ORAL at 10:08

## 2018-08-19 RX ADMIN — PANTOPRAZOLE SODIUM 40 MG: 40 TABLET, DELAYED RELEASE ORAL at 10:08

## 2018-08-19 RX ADMIN — CALCIUM ACETATE 1334 MG: 667 CAPSULE ORAL at 12:08

## 2018-08-19 RX ADMIN — AMLODIPINE BESYLATE 10 MG: 5 TABLET ORAL at 10:08

## 2018-08-19 RX ADMIN — FLUTICASONE PROPIONATE 100 MCG: 50 SPRAY, METERED NASAL at 10:08

## 2018-08-19 NOTE — PLAN OF CARE
Problem: Patient Care Overview  Goal: Plan of Care Review  Outcome: Ongoing (interventions implemented as appropriate)  Plan care explained and verbalized understanding. Patient is awake, alert and orientedx4. No orders for telemetry. Complaining of constipation. Scheduled  and as needed stool softeners given. Awaiting for bowel movement. Up in chair with seat alarm on. Non-skid socks on. Call light within reach and instructed to call for help when needed. Will continue to monitor.

## 2018-08-19 NOTE — PROGRESS NOTES
PGY-2 Progress Note [facility]    Follow up for: GI bleed  Hospital Stay Day 6    Subjective: Afebrile, BP elevated, good uop, reports 1 bowel movement, no blood present, tolerating diet without n/v, ambulating.  Reports abdo is still distended.      Denies any CP, SOB, N/V/D, headaches, fever or chills.     Scheduled Meds:   amLODIPine  10 mg Oral Daily    aspirin  81 mg Oral Daily    calcitRIOL  0.25 mcg Oral Daily    calcium acetate  1,334 mg Oral TID WM    carvedilol  3.125 mg Oral BID    [START ON 2018] epoetin enva (PROCRIT) injection  10,000 Units Intravenous Once in dialysis    fluticasone  2 spray Each Nare BID    furosemide  120 mg Oral BID    guaiFENesin  600 mg Oral BID    levothyroxine  50 mcg Oral Before breakfast    lidocaine  1 patch Transdermal Q24H    metoclopramide HCl  10 mg Intravenous Once    metOLazone  5 mg Oral Daily    minoxidil  2.5 mg Oral BID    pantoprazole  40 mg Oral Daily    polyethylene glycol  17 g Oral BID    ticagrelor  90 mg Oral BID    vitamin renal formula (B-complex-vitamin c-folic acid)  1 capsule Oral Daily     Continuous Infusions:  PRN Meds:sodium chloride, sodium chloride, sodium chloride, acetaminophen, acetaminophen, bisacodyl, dextrose 50%, dextrose 50%, fluticasone, glucagon (human recombinant), glucose, glucose, hydrALAZINE, insulin aspart U-100, ondansetron, ondansetron, ramelteon, simethicone, sodium chloride 0.9%, traMADol    Review of patient's allergies indicates:  No Known Allergies    Objectives:     Vitals(Most Recent)      BP  Min: 160/74  Max: 190/83  Temp  Av.5 °F (36.4 °C)  Min: 97.1 °F (36.2 °C)  Max: 98 °F (36.7 °C)  Pulse  Av.7  Min: 74  Max: 81  Resp  Av.8  Min: 17  Max: 18  SpO2  Av.6 %  Min: 99 %  Max: 100 %             Vitals([id number])  Temp:  [97.1 °F (36.2 °C)-98 °F (36.7 °C)]   Pulse:  [74-81]   Resp:  [17-18]   BP: (160-190)/(7-83)   SpO2:  [99 %-100 %]     I & O([id number])    Intake/Output Summary (Last 24  hours) at 8/19/2018 0837  Last data filed at 8/19/2018 0500  Gross per 24 hour   Intake 125 ml   Output 50 ml   Net 75 ml     Constitutional: He is oriented to person, place, and time. He appears well-developed and well-nourished. No distress.   HENT:   Head: Normocephalic and atraumatic.   Nose: Nose normal.   Eyes: Conjunctivae and EOM are normal. No scleral icterus.   IV line in place.   Neck: Normal range of motion. Neck supple.   Cardiovascular: Normal rate, regular rhythm, normal heart sounds and intact distal pulses. Exam reveals no gallop and no friction rub.   No murmur heard.  Pulmonary/Chest: Effort normal and breath sounds normal. No respiratory distress. He has no wheezes. He has no rales. He exhibits no tenderness.   Abdominal: Bowel sounds are normal. Abdo is soft, but distended. He exhibits no mass. There is no tenderness. There is no rebound and no guarding.   Musculoskeletal: Normal range of motion. He exhibits no edema, tenderness or deformity.   Neurological: He is alert and oriented to person, place, and time.   Skin: Skin is warm. Capillary refill takes less than 2 seconds. He is not diaphoretic.   Psychiatric: He has a normal mood and affect. His behavior is normal. Judgment and thought content normal.      LABS  CBC  Recent Labs   Lab  08/17/18   0720   08/18/18   0322   08/18/18   2022  08/18/18   2307  08/19/18   0420  08/19/18   0600   WBC  7.83   --   4.36   --    --    --   5.71   --    RBC  2.83*   --   2.23*   --    --    --   2.59*   --    HGB  8.2*   < >  6.4*   < >  7.8*  8.2*  7.4*   --    HCT  24.0*   < >  18.9*   < >  22.9*  24.6*  21.9*  21.7*   PLT  143*   --   117*   --    --    --   153   --    MCV  85   --   85   --    --    --   85   --    MCH  29.0   --   28.7   --    --    --   28.6   --    MCHC  34.2   --   33.9   --    --    --   33.8   --     < > = values in this interval not displayed.     BMP  Recent Labs   Lab  08/17/18   0720  08/18/18   0322  08/19/18   0420   NA   133*  134*  137   K  4.5  4.3  5.2*   CO2  22*  26  29   CL  99  101  100   BUN  84*  56*  81*   CREATININE  10.8*  8.2*  10.6*   GLU  111*  138*  80       POCT-Glucose  POCT Glucose   Date Value Ref Range Status   08/19/2018 77 70 - 110 mg/dL Final   08/18/2018 124 (H) 70 - 110 mg/dL Final   08/18/2018 151 (H) 70 - 110 mg/dL Final   08/18/2018 109 70 - 110 mg/dL Final   08/18/2018 153 (H) 70 - 110 mg/dL Final   08/17/2018 161 (H) 70 - 110 mg/dL Final   08/17/2018 131 (H) 70 - 110 mg/dL Final   08/17/2018 117 (H) 70 - 110 mg/dL Final   08/17/2018 106 70 - 110 mg/dL Final   08/16/2018 97 70 - 110 mg/dL Final   08/16/2018 82 70 - 110 mg/dL Final   08/16/2018 93 70 - 110 mg/dL Final       Recent Labs   Lab  08/17/18   0720  08/18/18   0322  08/19/18   0420   CALCIUM  9.1  8.9  9.8   MG  2.0  1.9  2.0   PHOS  4.5  3.9  4.7*     LFT  Recent Labs   Lab  08/17/18   0720  08/18/18   0322  08/19/18   0420   PROT  6.1  5.4*  6.1   ALBUMIN  3.1*  2.8*  3.2*   BILITOT  0.5  0.4  0.5   AST  25  25  32   ALKPHOS  41*  41*  43*   ALT  13  15  18       LAST HbA1c  Lab Results   Component Value Date    HGBA1C 6.2 (H) 08/13/2018           Imaging- no new imaging      Micro- no new micro labs     Assessment/Plan:    Patient is a 68 y.o. AA male with hx of ESRD (HD MTTF), HTN, CAD s/p stenting (on 8/6/18RCA on brillinta) and previous GI bleeds 2/2 small bowel  Transferred from OSH for GI work up and evaluation for GI bleed. S/p double balloon upper endoscopy.       GI Bleed  -  At OSH, EGD per general surgery revealed gastritis, duodenitis and multiple small punctate ulcers with no active bleeding  - Underwent capsule study with GI  - EGD performed 8/15- no active bleeding seen  - trending H/H, stable at this time   - Patient had upper ballon endoscopy two days ago- active bleed in proximal duodenum s/p 3 clips  - H/H dropped 1 unit, transfused 1 unit pRBC.  H/H stable at this time  - F/u GI recs     HTN  - BP elevated during hospital  admission   - continue amlodipine 10mg  - continue carvedilol 3.125mg  - hydralazine prn     CAD s/p recent stent placement   - continue asa and brilinta     ESRD  - received HD on MTThF  - nephro consulted and following  - Had HD on Friday.     T2DM  - A1c 6.2  - POCT glucose QID  - SSI     Constipation  - as seen on KUB  - have added docusate and increased miralax to BID   - improvement with 1 bowel movement yesterday        Code: full  Diet: renal diet  Dispo:trending H/H and f/u GI recs    Case discussed with staff    Katlyn Boss MD  [facility] HO2  08/19/2018

## 2018-08-19 NOTE — NURSING
IV site removed. No active bleeding noted. Discharge instructions and educational printouts given to pt and discussed thoroughly with patient and son. Both verbalized clear understanding of all discussed. At present no distress noted. Patient d/c;d via w/c with escort service and son with all of patient's belongings.

## 2018-08-19 NOTE — PLAN OF CARE
Discharge orders noted, no HH or HME ordered.    Pt's nurse will go over medications/signs and symptoms prior to discharge       08/19/18 1338   Final Note   Assessment Type Final Discharge Note   Discharge Disposition Home   What phone number can be called within the next 1-3 days to see how you are doing after discharge? [phone]   Hospital Follow Up  Appt(s) scheduled? No  (Offices closed for Weekend, Patient to schedule own follow up appointment)   Right Care Referral Info   Post Acute Recommendation No Care     Jennifer Wyman RN Transitional Navigator  [phone]

## 2018-08-20 ENCOUNTER — PATIENT OUTREACH (OUTPATIENT)
Dept: ADMINISTRATIVE | Facility: CLINIC | Age: 68
End: 2018-08-20

## 2018-08-20 LAB
BLD PROD TYP BPU: NORMAL
BLD PROD TYP BPU: NORMAL
BLOOD UNIT EXPIRATION DATE: NORMAL
BLOOD UNIT EXPIRATION DATE: NORMAL
BLOOD UNIT TYPE CODE: 6200
BLOOD UNIT TYPE CODE: 6200
BLOOD UNIT TYPE: NORMAL
BLOOD UNIT TYPE: NORMAL
CODING SYSTEM: NORMAL
CODING SYSTEM: NORMAL
DISPENSE STATUS: NORMAL
DISPENSE STATUS: NORMAL
TRANS ERYTHROCYTES VOL PATIENT: NORMAL ML
TRANS ERYTHROCYTES VOL PATIENT: NORMAL ML

## 2018-08-20 NOTE — PHYSICIAN QUERY
PT Name: Ki Roque  MR #: [id number]    Physician Query Form - Nutrition Clarification     CDS/: nIocencio PADGETT,RN,CDI            Contact information:[phone]    This form is a permanent document in the medical record.     Query Date: August 20, 2018    By submitting this query, we are merely seeking further clarification of documentation.. Please utilize your independent clinical judgment when addressing the question(s) below.    The Medical record contains the following:   Indicators  Supporting Clinical Findings Location in Medical Record    % of Estimated Energy Intake over a time frame from p.o., TF, or TPN      Weight Status over a time frame      Subcutaneous Fat and/or Muscle Loss      Fluid Accumulation or Edema      Reduced  Strength     x Wt / BMI / Usual Body Weight      WT= 182lb       BMI= 29.53         08/18/18 Hospital Medicine Progress Notes    Delayed Wound Healing / Failure to Thrive     x Acute or Chronic Illness      ESRD, Mineral Bone Disease, Malnutrition          08/14/18 Nephrology Consults       Medication     x Treatment      will order nepro with meals            Diet: renal diet                  08/14/18 Nephrology  Consults            08/19/18  Hospital Medicine Progress Notes    Other       AND / ASPEN Clinical Characteristics (October 2011)  A minimum of two characteristics is recommended for diagnosing either moderate or severe malnutrition   Mild Malnutrition Moderate Malnutrition Severe Malnutrition   Energy Intake from p.o., TF or TPN. < 75% intake of estimated energy needs for less than 7 days < 75% intake of estimated energy needs for greater than 7 days < 50% intake of estimated energy needs for > 5 days   Weight Loss 1-2% in 1 month  5% in 3 months  7.5% in 6 months  10% in 1 year 1-2 % in 1 week  5% in 1 month  7.5% in 3 months  10% in 6 months  20% in 1 year > 2% in 1 week  > 5% in 1 month  > 7.5% in 3 months  > 10% in 6 months  > 20% in 1 year    Physical Findings     None *Mild subcutaneous fat and/or muscle loss  *Mild fluid accumulation  *Stage II decubitus  *Surgical wound or non-healing wound *Mod/severe subcutaneous fat and/or muscle loss  *Mod/severe fluid accumulation  *Stage III or IV decubitus  *Non-healing surgical wound     Provider, please specify diagnosis or diagnoses associated with above clinical findings.    [x  ] Mild Protein-Calorie Malnutrition    [  ] Other Nutritional Diagnosis (please specify): ____________________________________    [  ] Clinically Undetermined    Please document in your progress notes daily for the duration of treatment until resolved and include in your discharge summary.

## 2018-08-20 NOTE — DISCHARGE SUMMARY
[facility]  Discharge Summary      Admit Date: 8/13/2018    Discharge Date and Time: 8/19/2018  5:04 PM    Attending Physician: No att. providers found     Reason for Admission: GI Bleed    Procedures Performed: Procedure(s) (LRB):  upper single balloon with general anesthesia (N/A)    History of Present Illness:  Patient is a 68 y.o. AA male with hx of ESRD (HD MTTF), HTN, CAD s/p stenting (on 8/6/18 RCA on brillinta and ASA) and previous GI bleeds 2/2 small bowel  Transferred from OSH for GI work up and evaluation for GI bleed. At OSH, EGD showed gastritis and muliple small ulcers with no active bleeding. H/H was unstable requiring 9 units over hospital course.      On arrival to [facility], patient reported fatigue,  some mild nausea and tenderness over the right groin where cath access was obtained on recent LHC. He denied bm today but reported melana over past week.      He had no other complaints. Denies cp/palpations, sob, dizziness, weakness, vomiting.     Hospital Course   On arrival to [facility] patient had a chest xray and EKG. He was continued on Coreg 3.125mg and Amlodipine 10mg as he was at OSH. He was started on IV fluids NS and two large bore (18 gauge) IV were obtained. He was typed and screened and 2 units were prepared since his H/H was 8.2/23.5 (on 8/13 at OSH). He was on SSI for DM2. On 8/14/18, his chest xray showed cardiomegaly and his H/H remained stable. He went to hemodialysis according with his MTTF schedule. On 8/15/18, his H/H went down to 6.9/20.9 so he was transfused 2u pRBC. EGD was performed and did not show active bleeding. On 8/16/18, he went back for dialysis. His upper balloon endoscopy showed active bleed in the proximal duodenum s/p 3 clips. On 8/17/18 patient had hemodialysis and had no new changes. On 8/18/18 his H/H dropped 1 unit so he received 1u pRBC. He was started on hydralazine prn for HTN. KUB showed stool. He was given docusate and increased Miralax for  constipation. On 8/19/18, his H/H was stable and constipation relieved. He was discharged home in stable condition and instructed to follow up with outpatient GI.    Consults: GI and nephrology    Significant Diagnostic Studies: Labs:   BMP:   Recent Labs   Lab  08/19/18   0420   GLU  80   NA  137   K  5.2*   CL  100   CO2  29   BUN  81*   CREATININE  10.6*   CALCIUM  9.8   MG  2.0   , CMP   Recent Labs   Lab  08/19/18   0420   NA  137   K  5.2*   CL  100   CO2  29   GLU  80   BUN  81*   CREATININE  10.6*   CALCIUM  9.8   PROT  6.1   ALBUMIN  3.2*   BILITOT  0.5   ALKPHOS  43*   AST  32   ALT  18   ANIONGAP  8   ESTGFRAFRICA  5*   EGFRNONAA  4*   , CBC   Recent Labs   Lab  08/19/18   0420   08/19/18   0848  08/19/18   1207   WBC  5.71   --    --   5.89   HGB  7.4*   --   7.6*  7.6*  7.6*   HCT  21.9*   < >   --   22.1*  22.1*   PLT  153   --    --   165    < > = values in this interval not displayed.     Recent Labs   Lab  08/13/18   2000   HGBA1C  6.2*     Radiology: X-Ray: CXR: X-Ray Chest 1 View (CXR):   Results for orders placed or performed during the hospital encounter of 08/13/18   X-Ray Chest 1 View    Narrative    EXAMINATION:  XR CHEST 1 VIEW    CLINICAL HISTORY:  gi bleed; Angiodysplasia of colon without hemorrhage    TECHNIQUE:  Single frontal view of the chest was performed.    COMPARISON:  None.    FINDINGS:  The mediastinal structures are midline.  The cardiac silhouette is enlarged, possibly in part due to AP view.  The lungs appear grossly clear.    No osseous abnormalities are seen.      Impression    Clear lungs.    Enlarged cardiac silhouette which may be in part related to AP view.      Electronically signed by: Bibi Siddiqui MD  Date:    08/14/2018  Time:    08:38   KUB: X-Ray Abdomen AP 1 View (KUB):   Results for orders placed or performed during the hospital encounter of 08/13/18   X-Ray Abdomen AP 1 View    Narrative    EXAMINATION:  XR ABDOMEN AP 1 VIEW    CLINICAL  HISTORY:  constipation; Constipation, unspecified    TECHNIQUE:  Single AP View of the abdomen was performed.    COMPARISON:  None.    FINDINGS:  Several metallic bodies project over the central abdomen, uncertain if external or intra-abdominal.  Bowel gas pattern is non-obstructive.  Stool noted throughout the ascending and transverse colon.  No evidence for pneumoperitoneum.  Degenerative changes of the lumbar spine noted.      Impression    As above.      Electronically signed by: Joshua Kumar MD  Date:    08/17/2018  Time:    11:19     KUB:excess stool  Upper balloon endoscopy: showed active bleed in the proximal duodenum    Final Diagnoses:    Principal Problem: Gastrointestinal hemorrhage      Discharged Condition: stable    Disposition: Home or Self Care    Follow Up/Patient Instructions: Follow up with outpatient GI physician within 1 week of discharge.    Medications:  Transfer Medications (for Discharge Readmit only):   No current facility-administered medications for this encounter.      Current Outpatient Medications   Medication Sig Dispense Refill    amLODIPine (NORVASC) 10 MG tablet Take 10 mg by mouth once daily.      atorvastatin (LIPITOR) 80 MG tablet Take 80 mg by mouth every evening.      calcitRIOL (ROCALTROL) 0.5 MCG Cap Take 0.5 mcg by mouth once daily.      carvedilol (COREG) 3.125 MG tablet Take 3.125 mg by mouth 2 (two) times daily.      cinacalcet (SENSIPAR) 30 MG Tab Take 60 mg by mouth daily with breakfast.       doxazosin (CARDURA) 2 MG tablet Take 2 mg by mouth every evening.      ferric citrate (AURYXIA) 210 mg iron Tab Take 210 mg by mouth 3 (three) times daily.      FOLIC ACID/VIT B COMPLEX AND C (LUIS-AMANDEEP ORAL) Take 1 tablet by mouth once daily.      furosemide (LASIX) 80 MG tablet Take 120 mg by mouth 2 (two) times daily.       minoxidil (LONITEN) 2.5 MG tablet Take 2.5 mg by mouth 2 (two) times daily.       pantoprazole (PROTONIX) 40 MG tablet Take 40 mg by mouth 2  (two) times daily.       sodium bicarbonate 650 MG tablet Take 650 mg by mouth 2 (two) times daily.      ticagrelor (BRILINTA) 90 mg tablet Take 90 mg by mouth 2 (two) times daily.      aspirin 81 MG Chew Take 1 tablet (81 mg total) by mouth once daily.  0    CALCIUM ACETATE (PHOSLO ORAL) Take 2 tablets by mouth 2 (two) times daily before meals.      epoetin neva (EPOGEN) 20,000 unit/mL injection Inject 20,000 Units into the skin 3 (three) times daily.      glipiZIDE (GLUCOTROL) 5 MG tablet Take 5 mg by mouth 2 (two) times daily before meals.      levothyroxine (SYNTHROID) 50 MCG tablet Take 50 mcg by mouth once daily.      metOLazone (ZAROXOLYN) 5 MG tablet Take 5 mg by mouth once daily.      nebivolol (BYSTOLIC) 2.5 MG Tab Take 10 mg by mouth once daily.      pantoprazole (PROTONIX) 40 MG tablet Take 1 tablet (40 mg total) by mouth once daily. 30 tablet 11    polyethylene glycol (GLYCOLAX) 17 gram PwPk Take 17 g by mouth 2 (two) times daily. 100 packet 0    ramipril (ALTACE) 10 MG capsule Take 10 mg by mouth once daily.       Discharge Procedure Orders   Diet diabetic     Diet renal     Diet Cardiac     Diet renal     Notify your health care provider if you experience any of the following:  temperature >100.4     Notify your health care provider if you experience any of the following:  persistent nausea and vomiting or diarrhea     Notify your health care provider if you experience any of the following:  severe uncontrolled pain     Notify your health care provider if you experience any of the following:  redness, tenderness, or signs of infection (pain, swelling, redness, odor or green/yellow discharge around incision site)     Notify your health care provider if you experience any of the following:  temperature >100.4     Notify your health care provider if you experience any of the following:  persistent nausea and vomiting or diarrhea     Notify your health care provider if you experience any of  the following:  redness, tenderness, or signs of infection (pain, swelling, redness, odor or green/yellow discharge around incision site)     Notify your health care provider if you experience any of the following:  severe uncontrolled pain     Notify your health care provider if you experience any of the following:  persistent dizziness, light-headedness, or visual disturbances     Activity as tolerated     Activity as tolerated     Follow-up Information     Go to Garth Manriquez MD.    Specialty:  Nephrology  Why:  Offices closed for Weekend, Patient to schedule own follow up appointment.  Contact information:  [address] [place] [address]  [phone]             Moustapha Guillen MD. Schedule an appointment as soon as possible for a visit in 1 day.    Specialty:  Gastroenterology  Why:  Offices closed for Weekend, Patient to schedule own follow up appointment  Contact information:  [address]  [facility] [address]  [phone]                  > 30 minutes spent on this discharge.     Clara Andrew

## 2018-08-20 NOTE — PATIENT INSTRUCTIONS
When You Have Gastrointestinal (GI) Bleeding    Blood in your vomit or stool can be a sign of gastrointestinal (GI) bleeding. GI bleeding can be scary. But the cause may not be serious. You should always see a doctor if GI bleeding occurs.  The GI tract  The GI tract is the path through which food travels in the body. Food passes from the mouth down the esophagus (the tube from the mouth to the stomach). Food begins to break down in the stomach. It then moves through the duodenum, the first part of the small intestine. Nutrients are absorbed as food travels through the small intestine. What is left passes into the colon (large intestine) as waste. The colon removes water from the waste. Waste continues from the colon to the rectum (where stool is stored). Waste then leaves the body through the anus.  Causes of GI bleeding  GI bleeding can be caused by many different problems. Some of the more common causes include:  · Swollen veins in the anus (hemorrhoids)  · Swollen veins in the esophagus (varices)  · Sore on the lining of the GI tract (ulcer)  · Cuts or scrapes in the mouth or throat  · Infection caused by germs such as bacteria or parasites  · Food allergies, such as milk allergy in young children  · Medicines  · Inflammation of the GI tract (gastritis or esophagitis)  · Colitis (Crohn's disease or ulcerative colitis)  · Cancer (tumors or polyps)  · Abnormal pouches in the colon (diverticula)  · Tears in the esophagus or anus  · Nosebleed  · Abnormal blood vessels in the GI tract (angiodysplasia)  Diagnosing the cause of blood in stool  If blood is coming out in your stool, you may have a lower GI tract problem or a very fast upper GI tract bleed. Bleeding from the GI tract can be bright red. Or it may look dark and tarry. Tests may also find blood in your stool that cant be seen with the eye (occult blood). To find out the cause, tests that may be ordered include:  · Blood tests. A blood sample is taken and  sent to a lab for exam.  · Hemoccult test. Checks a stool sample for blood.  · Stool culture. Checks a stool sample for bacteria or parasites.  · X-ray, ultrasound, or CT scan. Imaging tests that take pictures of the digestive tract.  · Colonoscopy or sigmoidoscopy. This test uses a flexible tube with a tiny camera. The tube is inserted through your anus into your rectum to see the inside of your colon. Your provider can also take a tiny tissue sample (biopsy) and treat a bleeding source  Diagnosing the cause of blood in vomit  If you are vomiting blood or something that looks like coffee grounds, you may have an upper GI tract problem. To find the cause, tests that may be done include:  · Upper Endoscopy. A flexible tube with a tiny camera is inserted through your mouth and throat to see inside your upper GI tract. This lets your provider take a tiny tissue sample (biopsy) and treat a bleeding source.  · Nasogastric lavage. This can tell if you have upper GI or lower GI bleeding.  · X-ray, ultrasound, or CT scan. Imaging tests that take pictures of your digestive tract.  · Upper GI series. X-rays of the upper part of your GI tract taken from inside your body.  · Enteroscopy. This sends a flexible tube or a small, swallowed capsule camera into your small intestine.  When to call your healthcare provider  Call your healthcare provider right away if you have any of the following:  · Bleeding from your mouth or anus that can't be stopped  · Fever of 100.4°F (38.0°) or higher  · Bleeding along with feeling lightheaded or dizzy  · Signs of fluid loss (dehydration). These include a dry, sticky mouth, decreased urine output; and very dark urine.  · Belly (abdominal) pain   Date Last Reviewed: 7/1/2016  © 2410-5204 [employer]. [address] [place] [address], [place] [address]. All rights reserved. This information is not intended as a substitute for professional medical care. Always follow your healthcare  professional's instructions.

## 2018-08-21 ENCOUNTER — TELEPHONE (OUTPATIENT)
Dept: NEUROLOGY | Facility: HOSPITAL | Age: 68
End: 2018-08-21

## 2018-08-21 LAB
ALBUMIN SERPL BCP-MCNC: 3.2 G/DL
ALP SERPL-CCNC: 43 U/L
ALT SERPL W/O P-5'-P-CCNC: 18 U/L
ANION GAP SERPL CALC-SCNC: 15 MMOL/L
AST SERPL-CCNC: 32 U/L
BILIRUB SERPL-MCNC: 0.5 MG/DL
BUN SERPL-MCNC: 81 MG/DL
CALCIUM SERPL-MCNC: 9.8 MG/DL
CHLORIDE SERPL-SCNC: 100 MMOL/L
CO2 SERPL-SCNC: 22 MMOL/L
CREAT SERPL-MCNC: 10.6 MG/DL
EST. GFR  (AFRICAN AMERICAN): 5 ML/MIN/1.73 M^2
EST. GFR  (NON AFRICAN AMERICAN): 4 ML/MIN/1.73 M^2
GLUCOSE SERPL-MCNC: 80 MG/DL
POTASSIUM SERPL-SCNC: 5.2 MMOL/L
PROT SERPL-MCNC: 6.1 G/DL
SODIUM SERPL-SCNC: 137 MMOL/L

## 2018-08-21 NOTE — TELEPHONE ENCOUNTER
[facility] with Dr. Luís Holm at [facility], pt has 2 point drop in hgb from 8/19/18.  HGgb 6.4.  Dr. Holm would like to know if pt should be sent to [facility] or be treated in [facility] to be contacted with Dr. Guillen recommendation.

## 2018-08-21 NOTE — TELEPHONE ENCOUNTER
Per Dr. Guillen, Mima with [facility] given recommendations to transfuse pt with 2 units in [place]  If pt's hgb fails to improve to have pt come to [facility] for 2nd transfusion in 1-2 weeks. Mima repeated information given correctly.

## 2018-08-21 NOTE — PHYSICIAN QUERY
"PT Name: Ki Roque  MR #: [id number]    Physician Query Form - Hematology Clarification      CDS/: Inocencio PADGETT,RN,CDI            Contact information:[phone]  This form is a permanent document in the medical record.      Query Date: August 21, 2018    By submitting this query, we are merely seeking further clarification of documentation. Please utilize your independent clinical judgment when addressing the question(s) below.    The Medical record contains the following:   Indicators  Supporting Clinical Findings Location in Medical Record   x "Anemia" documented      Mr. Roque is a 68 year old man with     recurrent, severe, normocytic anemia not     associated with melena            Blood loss anemia              08/13/18 Inpatient Consult to Gastroenterology-[facility]               08/14/18 Anesthesia Preprocedure Evaluation   x H & H =      HGB= 7.5------>7.7---->6.9---->9.3--->6.4-->7.6       HCT= 22.2----->22.6-->20.9-->28.4-->18.9->22.1           08/13---------------------------->08/19 Labs    BP =                     HR=     x "GI bleeding" documented     Reason for Admission: GI Bleed,needs transfusion        Final Diagnoses:      Principal Problem: Gastrointestinal hemorrhage                08/13/18 Anesthesia Preprocedure Evaluation             08/19/18 [facility] Discharge Summary    Acute bleeding (Non GI site)     x Transfusion(s)     Transfuse RBC 1 Unit    Transfuse RBC 2 Unit             08/15/18, 08/18/15 Blood Transfusion Record    Treatment:      Other:        Provider, please specify diagnosis or diagnoses associated with above clinical findings.    [x  ] Acute blood loss anemia    [  ] Precipitous drop in Hematocrit    [  ] Other Hematological Diagnosis (please specify): _________________________________    [  ] Clinically Undetermined       Please document in your progress notes daily for the duration of treatment, until resolved, and include in your discharge " summary.

## 2018-08-21 NOTE — TELEPHONE ENCOUNTER
----- Message from Helene Theodore RN sent at 8/20/2018 11:49 AM CDT -----  Hello,           Patient discharged over the weekend. He is from [place]. I see he is followed by Dr. Guillen. I was seeing if you can call patient to schedule follow-up, since he lives such a far distance. Thanks!    Thanks!  Helene Theodore RN  Transition Navigator  [phone]

## 2018-08-21 NOTE — TELEPHONE ENCOUNTER
"Pt feels a little weak from hospital stay.  Pt states "I don't see blood when I go to bathroom".  Pt instructed to contact this clinic in a month with signs or symptoms of bleeding.  Pt acknowledged understanding.  "

## 2018-08-23 ENCOUNTER — TELEPHONE (OUTPATIENT)
Dept: NEUROLOGY | Facility: HOSPITAL | Age: 68
End: 2018-08-23

## 2018-08-23 NOTE — TELEPHONE ENCOUNTER
Mima with [facility], pt had 2 units transfused on 8/21/18, count down.  Order given for 2 units this evening. Pt has leg pagan for bladder obstruction and placed on Brilinta and aspirin daily by cardiologist when stents placed early this month.  Mima notified per Dr. Guillen, pt to come to [facility] on Sunday, 8/26/18, procedure to be done on Monday, 8/27/18.  Mima repeated information correctly.

## 2018-08-23 NOTE — TELEPHONE ENCOUNTER
----- Message from Marcela Crenshaw sent at 8/23/2018 12:05 PM CDT -----  Contact: Mima with [facility]- Please call Mima with [facility] at [phone] option 4

## 2018-08-26 ENCOUNTER — HOSPITAL ENCOUNTER (OUTPATIENT)
Facility: HOSPITAL | Age: 68
Discharge: HOME | End: 2018-08-27
Attending: EMERGENCY MEDICINE | Admitting: FAMILY MEDICINE
Payer: MEDICARE

## 2018-08-26 DIAGNOSIS — K92.1 GASTROINTESTINAL HEMORRHAGE WITH MELENA: ICD-10-CM

## 2018-08-26 DIAGNOSIS — K92.2 GASTROINTESTINAL HEMORRHAGE: ICD-10-CM

## 2018-08-26 DIAGNOSIS — K92.2 UPPER GI BLEEDING: Primary | ICD-10-CM

## 2018-08-26 DIAGNOSIS — D64.9 ANEMIA, UNSPECIFIED TYPE: ICD-10-CM

## 2018-08-26 PROBLEM — I10 HTN (HYPERTENSION): Status: ACTIVE | Noted: 2018-08-26

## 2018-08-26 PROBLEM — E11.9 T2DM (TYPE 2 DIABETES MELLITUS): Status: ACTIVE | Noted: 2018-08-26

## 2018-08-26 LAB
ABO + RH BLD: NORMAL
ALBUMIN SERPL BCP-MCNC: 2.8 G/DL
ALP SERPL-CCNC: 45 U/L
ALT SERPL W/O P-5'-P-CCNC: 22 U/L
ANION GAP SERPL CALC-SCNC: 11 MMOL/L
AST SERPL-CCNC: 26 U/L
BASOPHILS # BLD AUTO: 0.05 K/UL
BASOPHILS NFR BLD: 0.8 %
BILIRUB SERPL-MCNC: 0.5 MG/DL
BLD GP AB SCN CELLS X3 SERPL QL: NORMAL
BLD PROD TYP BPU: NORMAL
BLOOD UNIT EXPIRATION DATE: NORMAL
BLOOD UNIT TYPE CODE: 6200
BLOOD UNIT TYPE: NORMAL
BUN SERPL-MCNC: 64 MG/DL
CALCIUM SERPL-MCNC: 8.9 MG/DL
CHLORIDE SERPL-SCNC: 96 MMOL/L
CO2 SERPL-SCNC: 30 MMOL/L
CODING SYSTEM: NORMAL
CREAT SERPL-MCNC: 8.5 MG/DL
DIFFERENTIAL METHOD: ABNORMAL
DISPENSE STATUS: NORMAL
EOSINOPHIL # BLD AUTO: 0.2 K/UL
EOSINOPHIL NFR BLD: 3.1 %
ERYTHROCYTE [DISTWIDTH] IN BLOOD BY AUTOMATED COUNT: 16 %
EST. GFR  (AFRICAN AMERICAN): 7 ML/MIN/1.73 M^2
EST. GFR  (NON AFRICAN AMERICAN): 6 ML/MIN/1.73 M^2
GLUCOSE SERPL-MCNC: 141 MG/DL
HCT VFR BLD AUTO: 20.6 %
HCT VFR BLD AUTO: 23 %
HGB BLD-MCNC: 6.8 G/DL
INR PPP: 1
LYMPHOCYTES # BLD AUTO: 0.9 K/UL
LYMPHOCYTES NFR BLD: 14.5 %
MCH RBC QN AUTO: 28.9 PG
MCHC RBC AUTO-ENTMCNC: 33 G/DL
MCV RBC AUTO: 88 FL
MONOCYTES # BLD AUTO: 0.3 K/UL
MONOCYTES NFR BLD: 4.1 %
NEUTROPHILS # BLD AUTO: 4.7 K/UL
NEUTROPHILS NFR BLD: 77.5 %
PLATELET # BLD AUTO: 116 K/UL
PMV BLD AUTO: 11 FL
POCT GLUCOSE: 166 MG/DL (ref 70–110)
POTASSIUM SERPL-SCNC: 3.5 MMOL/L
PROT SERPL-MCNC: 5.7 G/DL
PROTHROMBIN TIME: 10.7 SEC
RBC # BLD AUTO: 2.35 M/UL
SODIUM SERPL-SCNC: 137 MMOL/L
TRANS ERYTHROCYTES VOL PATIENT: NORMAL ML
WBC # BLD AUTO: 6.07 K/UL

## 2018-08-26 PROCEDURE — C9113 INJ PANTOPRAZOLE SODIUM, VIA: HCPCS | Performed by: STUDENT IN AN ORGANIZED HEALTH CARE EDUCATION/TRAINING PROGRAM

## 2018-08-26 PROCEDURE — G0378 HOSPITAL OBSERVATION PER HR: HCPCS

## 2018-08-26 PROCEDURE — 36415 COLL VENOUS BLD VENIPUNCTURE: CPT

## 2018-08-26 PROCEDURE — 25000003 PHARM REV CODE 250: Performed by: STUDENT IN AN ORGANIZED HEALTH CARE EDUCATION/TRAINING PROGRAM

## 2018-08-26 PROCEDURE — 99285 EMERGENCY DEPT VISIT HI MDM: CPT | Mod: 25

## 2018-08-26 PROCEDURE — 63600175 PHARM REV CODE 636 W HCPCS: Performed by: STUDENT IN AN ORGANIZED HEALTH CARE EDUCATION/TRAINING PROGRAM

## 2018-08-26 PROCEDURE — 85025 COMPLETE CBC W/AUTO DIFF WBC: CPT

## 2018-08-26 PROCEDURE — 86901 BLOOD TYPING SEROLOGIC RH(D): CPT

## 2018-08-26 PROCEDURE — 86920 COMPATIBILITY TEST SPIN: CPT | Mod: 59

## 2018-08-26 PROCEDURE — 36430 TRANSFUSION BLD/BLD COMPNT: CPT

## 2018-08-26 PROCEDURE — 85610 PROTHROMBIN TIME: CPT

## 2018-08-26 PROCEDURE — 85014 HEMATOCRIT: CPT | Mod: 91

## 2018-08-26 PROCEDURE — 80053 COMPREHEN METABOLIC PANEL: CPT

## 2018-08-26 PROCEDURE — P9021 RED BLOOD CELLS UNIT: HCPCS

## 2018-08-26 RX ORDER — INSULIN ASPART 100 [IU]/ML
1-10 INJECTION, SOLUTION INTRAVENOUS; SUBCUTANEOUS
Status: DISCONTINUED | OUTPATIENT
Start: 2018-08-26 | End: 2018-08-27 | Stop reason: HOSPADM

## 2018-08-26 RX ORDER — CALCITRIOL 0.25 UG/1
0.5 CAPSULE ORAL DAILY
Status: DISCONTINUED | OUTPATIENT
Start: 2018-08-27 | End: 2018-08-27 | Stop reason: HOSPADM

## 2018-08-26 RX ORDER — POLYETHYLENE GLYCOL 3350 17 G/17G
17 POWDER, FOR SOLUTION ORAL 2 TIMES DAILY
Status: DISCONTINUED | OUTPATIENT
Start: 2018-08-26 | End: 2018-08-27 | Stop reason: HOSPADM

## 2018-08-26 RX ORDER — CARVEDILOL 3.12 MG/1
3.12 TABLET ORAL 2 TIMES DAILY
Status: DISCONTINUED | OUTPATIENT
Start: 2018-08-26 | End: 2018-08-27 | Stop reason: HOSPADM

## 2018-08-26 RX ORDER — SODIUM CHLORIDE 0.9 % (FLUSH) 0.9 %
5 SYRINGE (ML) INJECTION
Status: DISCONTINUED | OUTPATIENT
Start: 2018-08-26 | End: 2018-08-27 | Stop reason: HOSPADM

## 2018-08-26 RX ORDER — NAPROXEN SODIUM 220 MG/1
81 TABLET, FILM COATED ORAL DAILY
Status: DISCONTINUED | OUTPATIENT
Start: 2018-08-27 | End: 2018-08-27 | Stop reason: HOSPADM

## 2018-08-26 RX ORDER — SEVELAMER CARBONATE 800 MG/1
800 TABLET, FILM COATED ORAL
Status: DISCONTINUED | OUTPATIENT
Start: 2018-08-27 | End: 2018-08-27 | Stop reason: HOSPADM

## 2018-08-26 RX ORDER — LEVOTHYROXINE SODIUM 50 UG/1
50 TABLET ORAL DAILY
Status: DISCONTINUED | OUTPATIENT
Start: 2018-08-27 | End: 2018-08-27 | Stop reason: HOSPADM

## 2018-08-26 RX ORDER — HYDROCODONE BITARTRATE AND ACETAMINOPHEN 500; 5 MG/1; MG/1
TABLET ORAL
Status: DISCONTINUED | OUTPATIENT
Start: 2018-08-26 | End: 2018-08-27 | Stop reason: HOSPADM

## 2018-08-26 RX ORDER — AMLODIPINE BESYLATE 5 MG/1
10 TABLET ORAL DAILY
Status: DISCONTINUED | OUTPATIENT
Start: 2018-08-27 | End: 2018-08-27 | Stop reason: HOSPADM

## 2018-08-26 RX ORDER — DOXAZOSIN 2 MG/1
2 TABLET ORAL NIGHTLY
Status: DISCONTINUED | OUTPATIENT
Start: 2018-08-26 | End: 2018-08-27 | Stop reason: HOSPADM

## 2018-08-26 RX ORDER — IBUPROFEN 200 MG
24 TABLET ORAL
Status: DISCONTINUED | OUTPATIENT
Start: 2018-08-26 | End: 2018-08-27 | Stop reason: HOSPADM

## 2018-08-26 RX ORDER — PANTOPRAZOLE SODIUM 40 MG/1
40 TABLET, DELAYED RELEASE ORAL 2 TIMES DAILY
Status: CANCELLED | OUTPATIENT
Start: 2018-08-26

## 2018-08-26 RX ORDER — ATORVASTATIN CALCIUM 40 MG/1
80 TABLET, FILM COATED ORAL NIGHTLY
Status: DISCONTINUED | OUTPATIENT
Start: 2018-08-26 | End: 2018-08-27 | Stop reason: HOSPADM

## 2018-08-26 RX ORDER — GLUCAGON 1 MG
1 KIT INJECTION
Status: DISCONTINUED | OUTPATIENT
Start: 2018-08-26 | End: 2018-08-27 | Stop reason: HOSPADM

## 2018-08-26 RX ORDER — IBUPROFEN 200 MG
16 TABLET ORAL
Status: DISCONTINUED | OUTPATIENT
Start: 2018-08-26 | End: 2018-08-27 | Stop reason: HOSPADM

## 2018-08-26 RX ORDER — FUROSEMIDE 40 MG/1
120 TABLET ORAL 2 TIMES DAILY
Status: DISCONTINUED | OUTPATIENT
Start: 2018-08-26 | End: 2018-08-27 | Stop reason: HOSPADM

## 2018-08-26 RX ORDER — CINACALCET 30 MG/1
60 TABLET, FILM COATED ORAL
Status: DISCONTINUED | OUTPATIENT
Start: 2018-08-27 | End: 2018-08-27 | Stop reason: HOSPADM

## 2018-08-26 RX ORDER — HYDRALAZINE HYDROCHLORIDE 20 MG/ML
10 INJECTION INTRAMUSCULAR; INTRAVENOUS EVERY 8 HOURS PRN
Status: DISCONTINUED | OUTPATIENT
Start: 2018-08-26 | End: 2018-08-27 | Stop reason: HOSPADM

## 2018-08-26 RX ADMIN — CARVEDILOL 3.12 MG: 3.12 TABLET, FILM COATED ORAL at 10:08

## 2018-08-26 RX ADMIN — ATORVASTATIN CALCIUM 80 MG: 40 TABLET, FILM COATED ORAL at 10:08

## 2018-08-26 RX ADMIN — INSULIN ASPART 1 UNITS: 100 INJECTION, SOLUTION INTRAVENOUS; SUBCUTANEOUS at 10:08

## 2018-08-26 RX ADMIN — DOXAZOSIN 2 MG: 2 TABLET ORAL at 10:08

## 2018-08-26 RX ADMIN — DEXTROSE 40 MG: 50 INJECTION, SOLUTION INTRAVENOUS at 10:08

## 2018-08-26 RX ADMIN — POLYETHYLENE GLYCOL 3350 17 G: 17 POWDER, FOR SOLUTION ORAL at 10:08

## 2018-08-26 RX ADMIN — FUROSEMIDE 120 MG: 40 TABLET ORAL at 10:08

## 2018-08-26 NOTE — ED NOTES
Patient's wife extremely angry with coming to ER.  She states she was told that patient would be a direct admit and they were instructed to come today.  Dr Emerson notified of wife's complaints.

## 2018-08-26 NOTE — ED NOTES
"Patient asked if he has been bleeding, he replies, "My head is thumping and that usually means I am bleeding"  Wife reports he has had blood in his stool.  "

## 2018-08-26 NOTE — ED NOTES
Spoke with wife and explained that Dr. Guillen left no information with Dr. Emerson and we would be trying to find out what he wants done.  Wife is calmer now and understands our position.  Apologies given for their wait while we contact the doctor.

## 2018-08-26 NOTE — ED PROVIDER NOTES
NAME:  Ki Roque  CSN:     [id number]  MRN:    [id number]  ADMIT DATE: 2018        eMERGENCY dEPARTMENT eNCOUnter    CHIEF COMPLAINT    Chief Complaint   Patient presents with    Abdominal Pain     68 year old male presents to ed cc of abdominal pain. patient states he has scheduled procedure with Dr. nicholson for tomorrow.        HPI      Ki Roque is a 68 y.o. male who presents to the ED for an endoscopy.  The patient was told to come to the hospital by Dr. cartagena to have an endoscopy done tomorrow.  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  Patient is a chronic GI bleeder.  He denies any new symptoms but states that he is continuing to have black stool.  No abdominal pain, no hematemesis.          ALLERGIES    Review of patient's allergies indicates:  No Known Allergies    PAST MEDICAL HISTORY  Past Medical History:   Diagnosis Date    Diabetes mellitus     Encounter for blood transfusion     Hemodialysis access, AV graft     Hypertension     Thyroid disease        SURGICAL HISTORY    Past Surgical History:   Procedure Laterality Date    graft placement          SOCIAL HISTORY    Social History     Socioeconomic History    Marital status:      Spouse name: None    Number of children: None    Years of education: None    Highest education level: None   Social Needs    Financial resource strain: None    Food insecurity - worry: None    Food insecurity - inability: None    Transportation needs - medical: None    Transportation needs - non-medical: None   Occupational History    None   Tobacco Use    Smoking status: Former Smoker     Last attempt to quit: 2009     Years since quittin.7    Smokeless tobacco: Never Used   Substance and Sexual Activity    Alcohol use: No    Drug use: No    Sexual activity: Yes     Partners: Female   Other Topics Concern    None   Social History Narrative    None       FAMILY HISTORY    Family History   Problem Relation Age of  "Onset    Cancer Father        REVIEW OF SYSTEMS   ROS  All Systems otherwise negative except as noted in the History of Present Illness.        PHYSICAL EXAM    Reviewed Triage Note  VITAL SIGNS:   ED Triage Vitals [08/26/18 1435]   Enc Vitals Group      BP (!) 130/56      Pulse 82      Resp 20      Temp 99.4 °F (37.4 °C)      Temp src Oral      SpO2 99 %      Weight 175 lb      Height 5' 6"      Head Circumference       Peak Flow       Pain Score       Pain Loc       Pain Edu?       Excl. in GC?        Patient Vitals for the past 24 hrs:   BP Temp Temp src Pulse Resp SpO2 Height Weight   08/26/18 1548 (!) 147/65 -- -- 76 16 100 % -- --   08/26/18 1435 (!) 130/56 99.4 °F (37.4 °C) Oral 82 20 99 % 5' 6" (1.676 m) 79.4 kg (175 lb)           Physical Exam    Constitutional:  Well-developed, well-nourished. No acute distress  HENT:  Normocephalic, atraumatic.  Eyes:  EOMI. Conjunctiva normal without discharge.   Neck: Normal range of motion.No stridor. No meningismus.   Respiratory:  Normal breath sounds bilaterally.  No respiratory distress, retractions, or conversational dyspnea. No wheezing. No rhonchi. No rales.   Cardiovascular:  Normal heart rate. Normal rhythm. No pitting lower extremity edema.   GI:  Abdomen soft, non-distended, non-tender. Normal bowel sounds. No guarding, rigidity or rebound.    : No CVA tenderness.   Musculoskeletal:  No gross deformity or limited range of motion of all major joints. No palpable bony deformity. No tenderness to palpation.  Integument:  Warm and dry. No rash.  Neurologic:  Normal motor function. Normal sensory function. No focal deficits noted. Alert and Interactive.  Psychiatric:  Affect normal. Mood normal.         LABS  Pertinent labs reviewed. (See chart for details)   Labs Reviewed   CBC W/ AUTO DIFFERENTIAL - Abnormal; Notable for the following components:       Result Value    RBC 2.35 (*)     Hemoglobin 6.8 (*)     Hematocrit 20.6 (*)     RDW 16.0 (*)     Platelets " 116 (*)     Lymph # 0.9 (*)     Gran% 77.5 (*)     Lymph% 14.5 (*)     All other components within normal limits   COMPREHENSIVE METABOLIC PANEL - Abnormal; Notable for the following components:    CO2 30 (*)     Glucose 141 (*)     BUN, Bld 64 (*)     Creatinine 8.5 (*)     Total Protein 5.7 (*)     Albumin 2.8 (*)     Alkaline Phosphatase 45 (*)     eGFR if  7 (*)     eGFR if non  6 (*)     All other components within normal limits   PROTIME-INR   TYPE & SCREEN   PREPARE RBC SOFT         RADIOLOGY    Imaging Results    None         PROCEDURES    Procedures      EKG     Interpreted by ERP:          ED COURSE & MEDICAL DECISION MAKING    Pertinent & Imaging studies reviewed. (See chart for details and specific orders.)        Medications   0.9%  NaCl infusion (for blood administration) (not administered)          I discussed the patient's case with Dr. cartagena who confirms that the patient is scheduled for an endoscopy at 11:00 a.m. tomorrow.  I discussed this case also with [facility] who will place him in observation       DISPOSITION  Patient placed in observation in stable condition at No discharge date for patient encounter.        FINAL IMPRESSION    1. Upper GI bleeding    2. Anemia, unspecified type              Critical care time spent with this patient (not including separately billable items) was  0 minutes.     DISCLAIMER: This note was prepared with Dragon NaturallySpeaking voice recognition transcription software. Garbled syntax, mangled pronouns, and other bizarre constructions may be attributed to that software system.      Jostin Emerson MD  08/26/2018  5:54 PM          Jostin Emerson MD  08/26/18 9886

## 2018-08-27 ENCOUNTER — ANESTHESIA (OUTPATIENT)
Dept: ENDOSCOPY | Facility: HOSPITAL | Age: 68
End: 2018-08-27
Payer: MEDICARE

## 2018-08-27 ENCOUNTER — ANESTHESIA EVENT (OUTPATIENT)
Dept: ENDOSCOPY | Facility: HOSPITAL | Age: 68
End: 2018-08-27
Payer: MEDICARE

## 2018-08-27 VITALS
BODY MASS INDEX: 28.7 KG/M2 | OXYGEN SATURATION: 99 % | RESPIRATION RATE: 14 BRPM | SYSTOLIC BLOOD PRESSURE: 145 MMHG | HEART RATE: 74 BPM | TEMPERATURE: 98 F | WEIGHT: 178.56 LBS | HEIGHT: 66 IN | DIASTOLIC BLOOD PRESSURE: 65 MMHG

## 2018-08-27 LAB
ALBUMIN SERPL BCP-MCNC: 2.6 G/DL
ALP SERPL-CCNC: 42 U/L
ALT SERPL W/O P-5'-P-CCNC: 35 U/L
ANION GAP SERPL CALC-SCNC: 10 MMOL/L
AST SERPL-CCNC: 36 U/L
BASOPHILS # BLD AUTO: 0.05 K/UL
BASOPHILS NFR BLD: 0.8 %
BILIRUB SERPL-MCNC: 0.6 MG/DL
BUN SERPL-MCNC: 80 MG/DL
CALCIUM SERPL-MCNC: 8.6 MG/DL
CHLORIDE SERPL-SCNC: 97 MMOL/L
CO2 SERPL-SCNC: 31 MMOL/L
CREAT SERPL-MCNC: 9.7 MG/DL
DIFFERENTIAL METHOD: ABNORMAL
EOSINOPHIL # BLD AUTO: 0.3 K/UL
EOSINOPHIL NFR BLD: 4.4 %
ERYTHROCYTE [DISTWIDTH] IN BLOOD BY AUTOMATED COUNT: 15.7 %
EST. GFR  (AFRICAN AMERICAN): 6 ML/MIN/1.73 M^2
EST. GFR  (NON AFRICAN AMERICAN): 5 ML/MIN/1.73 M^2
GLUCOSE SERPL-MCNC: 98 MG/DL
HCT VFR BLD AUTO: 21.2 %
HCT VFR BLD AUTO: 22.3 %
HCT VFR BLD AUTO: 22.3 %
HGB BLD-MCNC: 7.3 G/DL
LYMPHOCYTES # BLD AUTO: 0.9 K/UL
LYMPHOCYTES NFR BLD: 14.6 %
MAGNESIUM SERPL-MCNC: 1.7 MG/DL
MCH RBC QN AUTO: 29.1 PG
MCHC RBC AUTO-ENTMCNC: 32.7 G/DL
MCV RBC AUTO: 89 FL
MONOCYTES # BLD AUTO: 0.4 K/UL
MONOCYTES NFR BLD: 5.9 %
NEUTROPHILS # BLD AUTO: 4.8 K/UL
NEUTROPHILS NFR BLD: 74 %
PHOSPHATE SERPL-MCNC: 4.3 MG/DL
PLATELET # BLD AUTO: 117 K/UL
PMV BLD AUTO: 10.7 FL
POCT GLUCOSE: 101 MG/DL (ref 70–110)
POTASSIUM SERPL-SCNC: 4 MMOL/L
PROT SERPL-MCNC: 5.4 G/DL
RBC # BLD AUTO: 2.51 M/UL
SODIUM SERPL-SCNC: 138 MMOL/L
WBC # BLD AUTO: 6.42 K/UL

## 2018-08-27 PROCEDURE — 44378 SMALL BOWEL ENDOSCOPY: CPT | Performed by: INTERNAL MEDICINE

## 2018-08-27 PROCEDURE — 80053 COMPREHEN METABOLIC PANEL: CPT

## 2018-08-27 PROCEDURE — 90935 HEMODIALYSIS ONE EVALUATION: CPT

## 2018-08-27 PROCEDURE — P9021 RED BLOOD CELLS UNIT: HCPCS

## 2018-08-27 PROCEDURE — 85014 HEMATOCRIT: CPT | Mod: 91

## 2018-08-27 PROCEDURE — 94761 N-INVAS EAR/PLS OXIMETRY MLT: CPT

## 2018-08-27 PROCEDURE — 25000003 PHARM REV CODE 250: Performed by: STUDENT IN AN ORGANIZED HEALTH CARE EDUCATION/TRAINING PROGRAM

## 2018-08-27 PROCEDURE — 37000008 HC ANESTHESIA 1ST 15 MINUTES: Performed by: INTERNAL MEDICINE

## 2018-08-27 PROCEDURE — 84100 ASSAY OF PHOSPHORUS: CPT

## 2018-08-27 PROCEDURE — 83735 ASSAY OF MAGNESIUM: CPT

## 2018-08-27 PROCEDURE — 63600175 PHARM REV CODE 636 W HCPCS: Performed by: NURSE ANESTHETIST, CERTIFIED REGISTERED

## 2018-08-27 PROCEDURE — 37000009 HC ANESTHESIA EA ADD 15 MINS: Performed by: INTERNAL MEDICINE

## 2018-08-27 PROCEDURE — 27202087 HC PROBE, APC: Performed by: INTERNAL MEDICINE

## 2018-08-27 PROCEDURE — G0378 HOSPITAL OBSERVATION PER HR: HCPCS

## 2018-08-27 PROCEDURE — 36415 COLL VENOUS BLD VENIPUNCTURE: CPT

## 2018-08-27 PROCEDURE — 27201030 HC OVERTUBE: Performed by: INTERNAL MEDICINE

## 2018-08-27 PROCEDURE — 99203 OFFICE O/P NEW LOW 30 MIN: CPT | Mod: GC,,, | Performed by: INTERNAL MEDICINE

## 2018-08-27 PROCEDURE — 85025 COMPLETE CBC W/AUTO DIFF WBC: CPT

## 2018-08-27 RX ORDER — PROPOFOL 10 MG/ML
VIAL (ML) INTRAVENOUS
Status: DISCONTINUED | OUTPATIENT
Start: 2018-08-27 | End: 2018-08-27

## 2018-08-27 RX ORDER — SODIUM CHLORIDE 9 MG/ML
INJECTION, SOLUTION INTRAVENOUS ONCE
Status: DISCONTINUED | OUTPATIENT
Start: 2018-08-27 | End: 2018-08-27 | Stop reason: HOSPADM

## 2018-08-27 RX ORDER — SODIUM CHLORIDE 9 MG/ML
INJECTION, SOLUTION INTRAVENOUS
Status: DISCONTINUED | OUTPATIENT
Start: 2018-08-27 | End: 2018-08-27 | Stop reason: HOSPADM

## 2018-08-27 RX ORDER — PROPOFOL 10 MG/ML
VIAL (ML) INTRAVENOUS CONTINUOUS PRN
Status: DISCONTINUED | OUTPATIENT
Start: 2018-08-27 | End: 2018-08-27

## 2018-08-27 RX ORDER — HYDROCODONE BITARTRATE AND ACETAMINOPHEN 500; 5 MG/1; MG/1
TABLET ORAL
Status: DISCONTINUED | OUTPATIENT
Start: 2018-08-27 | End: 2018-08-27 | Stop reason: HOSPADM

## 2018-08-27 RX ORDER — CLOPIDOGREL BISULFATE 75 MG/1
75 TABLET ORAL DAILY
Qty: 30 TABLET | Refills: 5 | Status: SHIPPED | OUTPATIENT
Start: 2018-08-27 | End: 2019-02-23

## 2018-08-27 RX ORDER — FENTANYL CITRATE 50 UG/ML
INJECTION, SOLUTION INTRAMUSCULAR; INTRAVENOUS
Status: DISCONTINUED | OUTPATIENT
Start: 2018-08-27 | End: 2018-08-27

## 2018-08-27 RX ADMIN — LEVOTHYROXINE SODIUM 50 MCG: 50 TABLET ORAL at 02:08

## 2018-08-27 RX ADMIN — CALCITRIOL 0.5 MCG: 0.25 CAPSULE, LIQUID FILLED ORAL at 02:08

## 2018-08-27 RX ADMIN — PROPOFOL 150 MCG/KG/MIN: 10 INJECTION, EMULSION INTRAVENOUS at 01:08

## 2018-08-27 RX ADMIN — FENTANYL CITRATE 25 MCG: 50 INJECTION, SOLUTION INTRAMUSCULAR; INTRAVENOUS at 01:08

## 2018-08-27 RX ADMIN — Medication 1 CAPSULE: at 02:08

## 2018-08-27 RX ADMIN — ASPIRIN 81 MG 81 MG: 81 TABLET ORAL at 02:08

## 2018-08-27 RX ADMIN — PROPOFOL 50 MG: 10 INJECTION, EMULSION INTRAVENOUS at 01:08

## 2018-08-27 RX ADMIN — FENTANYL CITRATE 50 MCG: 50 INJECTION, SOLUTION INTRAMUSCULAR; INTRAVENOUS at 01:08

## 2018-08-27 NOTE — PT/OT/SLP PROGRESS
Occupational Therapy      Patient Name:  Ki Roque   MRN:  [id number]    10:38 AM Patient not seen today secondary to [facility] in HD  . Will follow-up as available.    12:54 PM: Pt not seen 2/2 [facility] for Endo procedure. RN reports pt will not be appropriate for therapy this date. Will reattempt evaluation tomorrow's date.    Viv [place] OT  8/27/2018

## 2018-08-27 NOTE — ASSESSMENT & PLAN NOTE
Recent stenting 8/6/2018  S/p DAPT of ASA and Brilinta   Maintained on ASA   Continue ASA therapy

## 2018-08-27 NOTE — H&P
"[facility]  History & Physical    Patient Name: Ki Roque  MRN: [id number]  Admission Date: 8/26/2018  Attending Physician: Bubba Gonzalez MD  Primary Care Provider: To Obtain Unable         Patient information was obtained from patient, spouse/SO and ER records.     Subjective:     Principal Problem:Gastrointestinal hemorrhage    Chief Complaint:   Chief Complaint   Patient presents with    Abdominal Pain     68 year old male presents to ed cc of abdominal pain. patient states he has scheduled procedure with Dr. guillen for tomorrow.         HPI: Patient is a 67 yo male pmhx of ESRD (HD MTTF), HTN, CAD s/p stenting (on 8/6/18 RCA on ASA and plavix) and previous GI bleed 2/2 small bowel. He was recently admitted to our service and underwent EGD with Dr. Guillen that showed dieulafoy lesion with active bleeding in the proximal jejunum.  Three hemostatic clips were placed after achieving hemostasis.  Patient was discharged home to continue 6 months of DAPT for recent cardiac stents.  Patient reports since he was discharged, he's required 4 units of pRBC transfusion at outside facility.  He contacted Dr. Guillen and was told to present to ED for admission and procedure on Monday.  Patient reports he's been experiencing a "thumping" in his head which indicated his blood count was low.  He also reports shortness of breath and distention of abdomen.      He also reports he has fluid on him and doctor placed a catheter.  Patient receives HD on MTTF.              Past Medical History:   Diagnosis Date    Diabetes mellitus     Encounter for blood transfusion     Hemodialysis access, AV graft     Hypertension     Thyroid disease        Past Surgical History:   Procedure Laterality Date    graft placement          Review of patient's allergies indicates:  No Known Allergies    No current facility-administered medications on file prior to encounter.      Current Outpatient " Medications on File Prior to Encounter   Medication Sig    amLODIPine (NORVASC) 10 MG tablet Take 10 mg by mouth once daily.    aspirin 81 MG Chew Take 1 tablet (81 mg total) by mouth once daily.    atorvastatin (LIPITOR) 80 MG tablet Take 80 mg by mouth every evening.    calcitRIOL (ROCALTROL) 0.5 MCG Cap Take 0.5 mcg by mouth once daily.    CALCIUM ACETATE (PHOSLO ORAL) Take 2 tablets by mouth 2 (two) times daily before meals.    carvedilol (COREG) 3.125 MG tablet Take 3.125 mg by mouth 2 (two) times daily.    cinacalcet (SENSIPAR) 30 MG Tab Take 60 mg by mouth daily with breakfast.     doxazosin (CARDURA) 2 MG tablet Take 2 mg by mouth every evening.    epoetin neva (EPOGEN) 20,000 unit/mL injection Inject 20,000 Units into the skin 3 (three) times daily.    ferric citrate (AURYXIA) 210 mg iron Tab Take 210 mg by mouth 3 (three) times daily.    FOLIC ACID/VIT B COMPLEX AND C (LUIS-AMANDEEP ORAL) Take 1 tablet by mouth once daily.    furosemide (LASIX) 80 MG tablet Take 120 mg by mouth 2 (two) times daily.     glipiZIDE (GLUCOTROL) 5 MG tablet Take 5 mg by mouth 2 (two) times daily before meals.    levothyroxine (SYNTHROID) 50 MCG tablet Take 50 mcg by mouth once daily.    metOLazone (ZAROXOLYN) 5 MG tablet Take 5 mg by mouth once daily.    minoxidil (LONITEN) 2.5 MG tablet Take 2.5 mg by mouth 2 (two) times daily.     nebivolol (BYSTOLIC) 2.5 MG Tab Take 10 mg by mouth once daily.    pantoprazole (PROTONIX) 40 MG tablet Take 40 mg by mouth 2 (two) times daily.     pantoprazole (PROTONIX) 40 MG tablet Take 1 tablet (40 mg total) by mouth once daily.    polyethylene glycol (GLYCOLAX) 17 gram PwPk Take 17 g by mouth 2 (two) times daily.    ramipril (ALTACE) 10 MG capsule Take 10 mg by mouth once daily.    sodium bicarbonate 650 MG tablet Take 650 mg by mouth 2 (two) times daily.    ticagrelor (BRILINTA) 90 mg tablet Take 90 mg by mouth 2 (two) times daily.     Family History     Problem Relation  (Age of Onset)    Cancer Father        Tobacco Use    Smoking status: Former Smoker     Last attempt to quit: 2009     Years since quittin.7    Smokeless tobacco: Never Used   Substance and Sexual Activity    Alcohol use: No    Drug use: No    Sexual activity: Yes     Partners: Female     Review of Systems   Constitutional: Positive for appetite change. Negative for chills, fatigue and fever.   HENT: Negative for congestion and sore throat.    Respiratory: Positive for shortness of breath. Negative for cough and chest tightness.    Cardiovascular: Negative for chest pain, palpitations and leg swelling.   Gastrointestinal: Positive for constipation. Negative for abdominal pain, blood in stool, diarrhea, nausea and vomiting.   Genitourinary: Negative for dysuria.        Catheter placed   Musculoskeletal: Negative for neck pain and neck stiffness.   Neurological: Positive for headaches. Negative for dizziness, weakness and light-headedness.   Psychiatric/Behavioral: Negative for agitation and behavioral problems.     Objective:     Vital Signs (Most Recent):  Temp: 96.8 °F (36 °C) (18)  Pulse: 80 (18)  Resp: 20 (18)  BP: (!) 183/77 (18)  SpO2: 99 % (18) Vital Signs (24h Range):  Temp:  [96.8 °F (36 °C)-99.4 °F (37.4 °C)] 96.8 °F (36 °C)  Pulse:  [76-82] 80  Resp:  [16-20] 20  SpO2:  [99 %-100 %] 99 %  BP: (130-183)/(56-77) 183/77     Weight: 81 kg (178 lb 9.2 oz)  Body mass index is 28.82 kg/m².    Physical Exam   Constitutional: He is oriented to person, place, and time. He appears well-developed and well-nourished. No distress.   HENT:   Head: Normocephalic and atraumatic.   Mouth/Throat: No oropharyngeal exudate.   Eyes: EOM are normal. Pupils are equal, round, and reactive to light. No scleral icterus.   Neck: Normal range of motion. Neck supple.   Cardiovascular: Normal rate, regular rhythm, normal heart sounds and intact distal pulses.   No  murmur heard.  Pulmonary/Chest: Effort normal. No respiratory distress.   Abdominal: Soft. He exhibits distension. There is no tenderness. There is no guarding.   Musculoskeletal: Normal range of motion. He exhibits no edema.   Neurological: He is alert and oriented to person, place, and time. No cranial nerve deficit. He exhibits normal muscle tone. Coordination normal.   Skin: Skin is warm and dry. Capillary refill takes less than 2 seconds. He is not diaphoretic.   Psychiatric: He has a normal mood and affect. His behavior is normal.   Nursing note and vitals reviewed.        CRANIAL NERVES     CN III, IV, VI   Pupils are equal, round, and reactive to light.  Extraocular motions are normal.        Significant Labs:   CBC:   Recent Labs   Lab  08/26/18   1616   WBC  6.07   HGB  6.8*   HCT  20.6*   PLT  116*     CMP:   Recent Labs   Lab  08/26/18   1616   NA  137   K  3.5   CL  96   CO2  30*   GLU  141*   BUN  64*   CREATININE  8.5*   CALCIUM  8.9   PROT  5.7*   ALBUMIN  2.8*   BILITOT  0.5   ALKPHOS  45*   AST  26   ALT  22   ANIONGAP  11   EGFRNONAA  6*     Coagulation:   Recent Labs   Lab  08/26/18   1616   INR  1.0     Significant Imaging: I have reviewed all pertinent imaging results/findings within the past 24 hours.    Assessment/Plan:     * Gastrointestinal hemorrhage    Likely 2/2 to known hx of AVM  Afebrile, /56, HR 82   H/H of 6.8/20.6 on admit  - NPO  - Two large bore (> 18 gauge) IV obtained  - Protonix 40mg ordered  - Trending H/H q6h. Transfuse for goal Hb > 8 give cardiac history  - GI consulted and plan for scope in AM  - continue ASA therapy per GI           T2DM (type 2 diabetes mellitus)    -A1C 6.2  -Diet controlled at home  -SSI             HTN (hypertension)    Continue home amlodipine and coreg          ESRD (end stage renal disease)    Receives HD on MTTF  Patient reports last dialyzed today in preparation for procedure tomorrow   Consulted nephro           CAD (coronary artery  disease)    Recent stenting 8/6/2018  S/p DAPT of ASA and Brilinta   Maintained on ASA   Continue ASA therapy             VTE Risk Mitigation (From admission, onward)        Ordered     IP VTE HIGH RISK PATIENT  Once      08/26/18 2005     Place LAMBERTO hose  Until discontinued      08/26/18 2005             Katlyn Boss MD  Department of Hospital Medicine   [facility]

## 2018-08-27 NOTE — CONSULTS
[employer] renal fellow KELLI KEITH    consult note    Reason for Consult:     ESRD    Subjective:      History of Present Illness:  Ki Roque is a 68 y.o. AA  male who  has a past medical history of Diabetes mellitus, Encounter for blood transfusion, Hemodialysis access, AV graft, Hypertension, and Thyroid disease.. The patient presented to the [facility] on 8/26/2018 with a primary complaint of Abdominal Pain (68 year old male presents to ed cc of abdominal pain. patient states he has scheduled procedure with Dr. nicholson for tomorrow. )    Pt admitted for abd pain and planned EGD for today; recently discharged from hospital after small bowel GI bleed.    Renal Hx  -ESRD 2/2 DM in 2016; pt is a home hemodialysis pt who dialyzes M/T/Th/F x 2.5 hrs; target weight 77-78kg; pt makes less than a cup of urine/day; access proximal LUE AVG  -outpt nephrologist Dr. Manriquez in [place]    ROs  Pt states he is tired of coming in and out of hospital; denies sob/cp; states they removed pagan this am        Past Medical History:  Past Medical History:   Diagnosis Date    Diabetes mellitus     Encounter for blood transfusion     Hemodialysis access, AV graft     Hypertension     Thyroid disease        Past Surgical History:  Past Surgical History:   Procedure Laterality Date    graft placement          Allergies:  Review of patient's allergies indicates:  No Known Allergies    Medications:   In-Hospital Scheduled Medications:   sodium chloride 0.9%   Intravenous Once    amLODIPine  10 mg Oral Daily    aspirin  81 mg Oral Daily    atorvastatin  80 mg Oral QHS    calcitRIOL  0.5 mcg Oral Daily    carvedilol  3.125 mg Oral BID    cinacalcet  60 mg Oral Daily with breakfast    doxazosin  2 mg Oral QHS    epoetin neva (PROCRIT) injection  10,000 Units Intravenous Once in dialysis    furosemide  120 mg Oral BID    levothyroxine  50 mcg Oral Daily    pantoprazole 40 mg in dextrose 5 % 100 mL infusion (ready to mix system)  40 mg  Intravenous BID    polyethylene glycol  17 g Oral BID    sevelamer carbonate  800 mg Oral TID WM    vitamin renal formula (B-complex-vitamin c-folic acid)  1 capsule Oral Daily      In-Hospital PRN Medications:  sodium chloride, sodium chloride 0.9%, dextrose 50%, dextrose 50%, glucagon (human recombinant), glucose, glucose, hydrALAZINE, insulin aspart U-100, sodium chloride 0.9%   In-Hospital IV Infusion Medications:     Home Medications:  Prior to Admission medications    Medication Sig Start Date End Date Taking? Authorizing Provider   amLODIPine (NORVASC) 10 MG tablet Take 10 mg by mouth once daily.    Historical Provider, MD   aspirin 81 MG Chew Take 1 tablet (81 mg total) by mouth once daily. 8/18/18 8/18/19  Katlyn Boss MD   atorvastatin (LIPITOR) 80 MG tablet Take 80 mg by mouth every evening.    Historical Provider, MD   calcitRIOL (ROCALTROL) 0.5 MCG Cap Take 0.5 mcg by mouth once daily.    Historical Provider, MD   CALCIUM ACETATE (PHOSLO ORAL) Take 2 tablets by mouth 2 (two) times daily before meals.    Historical Provider, MD   carvedilol (COREG) 3.125 MG tablet Take 3.125 mg by mouth 2 (two) times daily.    Historical Provider, MD   cinacalcet (SENSIPAR) 30 MG Tab Take 60 mg by mouth daily with breakfast.     Historical Provider, MD   doxazosin (CARDURA) 2 MG tablet Take 2 mg by mouth every evening.    Historical Provider, MD   epoetin neva (EPOGEN) 20,000 unit/mL injection Inject 20,000 Units into the skin 3 (three) times daily.    Historical Provider, MD   ferric citrate (AURYXIA) 210 mg iron Tab Take 210 mg by mouth 3 (three) times daily.    Historical Provider, MD   FOLIC ACID/VIT B COMPLEX AND C (LUIS-AMANDEEP ORAL) Take 1 tablet by mouth once daily.    Historical Provider, MD   furosemide (LASIX) 80 MG tablet Take 120 mg by mouth 2 (two) times daily.     Historical Provider, MD   glipiZIDE (GLUCOTROL) 5 MG tablet Take 5 mg by mouth 2 (two) times daily before meals.    Historical  "Provider, MD   levothyroxine (SYNTHROID) 50 MCG tablet Take 50 mcg by mouth once daily.    Historical Provider, MD   metOLazone (ZAROXOLYN) 5 MG tablet Take 5 mg by mouth once daily.    Historical Provider, MD   minoxidil (LONITEN) 2.5 MG tablet Take 2.5 mg by mouth 2 (two) times daily.     Historical Provider, MD   nebivolol (BYSTOLIC) 2.5 MG Tab Take 10 mg by mouth once daily.    Historical Provider, MD   pantoprazole (PROTONIX) 40 MG tablet Take 40 mg by mouth 2 (two) times daily.     Historical Provider, MD   pantoprazole (PROTONIX) 40 MG tablet Take 1 tablet (40 mg total) by mouth once daily. 18  Katlyn Boss MD   polyethylene glycol (GLYCOLAX) 17 gram PwPk Take 17 g by mouth 2 (two) times daily. 18   Katlyn Boss MD   ramipril (ALTACE) 10 MG capsule Take 10 mg by mouth once daily.    Historical Provider, MD   sodium bicarbonate 650 MG tablet Take 650 mg by mouth 2 (two) times daily.    Historical Provider, MD   ticagrelor (BRILINTA) 90 mg tablet Take 90 mg by mouth 2 (two) times daily.    Historical Provider, MD       Family History:  Family History   Problem Relation Age of Onset    Cancer Father        Social History:  Social History     Tobacco Use    Smoking status: Former Smoker     Last attempt to quit: 2009     Years since quittin.7    Smokeless tobacco: Never Used   Substance Use Topics    Alcohol use: No    Drug use: No       Review of Systems:  All other systems are reviewed and are negative.    Health Maintenance:     Immunizations:   Currently on File:   There is no immunization history on file for this patient.       Objective:   Last 24 Hour Vital Signs:  BP  Min: 130/56  Max: 184/81  Temp  Av °F (36.7 °C)  Min: 96.8 °F (36 °C)  Max: 99.4 °F (37.4 °C)  Pulse  Av.2  Min: 76  Max: 131  Resp  Av.4  Min: 16  Max: 20  SpO2  Av.5 %  Min: 96 %  Max: 100 %  Height  Av' 6" (167.6 cm)  Min: 5' 6" (167.6 cm)  Max: 5' 6" (167.6 " cm)  Weight  Av.2 kg (176 lb 12.6 oz)  Min: 79.4 kg (175 lb)  Max: 81 kg (178 lb 9.2 oz)  I/O last 3 completed shifts:  In: 458 [P.O.:100; I.V.:150; Blood:208]  Out: 100 [Urine:100]    Physical Examination:  GEN - NAD, AAOx4  HEAD - NCAT   EYES - PERRLA, non icteric   NECK - JVP 5 ; no thyromegaly   CHEST - lungs CTA bilaterally; equal expansion   HEART - RRR, no m/r/s3/s4   ABD - non distended, non ttp; no guarding or rigidity   EXTR  -warm; no edema  Skin - no tenting; no bruising; no ecchymosis   Neuro - no asterixis or localizing deficits      Laboratory Results:    Trended Lab Data:  Recent Labs   Lab  18   1616  18   2231  18   0330   WBC  6.07   --   6.42   HGB  6.8*   --   7.3*   HCT  20.6*  23.0*  22.3*  22.3*   PLT  116*   --   117*   MCV  88   --   89   RDW  16.0*   --   15.7*   NA  137   --   138   K  3.5   --   4.0   CL  96   --   97   CO2  30*   --   31*   BUN  64*   --   80*   GLU  141*   --   98   PROT  5.7*   --   5.4*   ALBUMIN  2.8*   --   2.6*   BILITOT  0.5   --   0.6   AST  26   --   36   ALKPHOS  45*   --   42*   ALT  22   --   35       Trended Cardiac Data:  No results for input(s): TROPONINI, CKTOTAL, CKMB, BNP in the last 168 hours.          Radiology:  X-ray Chest 1 View    Result Date: 2018  EXAMINATION: XR CHEST 1 VIEW CLINICAL HISTORY: gi bleed; Angiodysplasia of colon without hemorrhage TECHNIQUE: Single frontal view of the chest was performed. COMPARISON: None. FINDINGS: The mediastinal structures are midline.  The cardiac silhouette is enlarged, possibly in part due to AP view.  The lungs appear grossly clear. No osseous abnormalities are seen.     Clear lungs. Enlarged cardiac silhouette which may be in part related to AP view. Electronically signed by: Bibi Siddiqui MD Date:    2018 Time:    08:38    X-ray Abdomen Ap 1 View    Result Date: 2018  EXAMINATION: XR ABDOMEN AP 1 VIEW CLINICAL HISTORY: constipation; Constipation, unspecified  TECHNIQUE: Single AP View of the abdomen was performed. COMPARISON: None. FINDINGS: Several metallic bodies project over the central abdomen, uncertain if external or intra-abdominal.  Bowel gas pattern is non-obstructive.  Stool noted throughout the ascending and transverse colon.  No evidence for pneumoperitoneum.  Degenerative changes of the lumbar spine noted.     As above. Electronically signed by: Joshua Kumar MD Date:    08/17/2018 Time:    11:19           Assessment/Plan     This is a 69 yo AA male who was transferred from outside hospital for persistent GI bleeding; L-renal consulted for     ESRD  -ESRD 2/2 DM in 2016; pt is a home hemodialysis pt who dialyzes M/T/Th/F x 2.5 hrs; target weight 77-78kg; pt makes less than a cup of urine/day; access proximal LUE AVG  -outpt nephrologist Dr. Manriquez in [place]  -will cont outpt HD schedule while in house  -cont home metolazone 5 mg + lasix 120mg BID; cont home phoslo 1334mg with meals/nephrocaps when reshma po    Normocytic anemia  -will give epo/IV iron with HD prn     MBD  -cont oral calcitriol     Malnutrition  -rec nepro with meals     GI bleed  -received 1 u pRBC  -EGD today        Bubba Guallpa II  [employer] renal HO [facility]  [phone]

## 2018-08-27 NOTE — ANESTHESIA PREPROCEDURE EVALUATION
08/27/2018  Ki Roque is a 68 y.o., male w GI angiodysplasia & GI blood loss anemia; admitted to ICU->med/surg floor; transfused this admission; had prox enteroscopy GA [id number]; now for ENTEROSCOPY, PROXIMAL-Upper SBE (N/A ) (GA).    PRIOR ANES (in Epic) 20180815 [id number] Upper_GI_Endoscopy [place]    [fent 50, etom 12] ->110 (w some sevo)    [sevo] SBP 90<->180    [ø mask vent; ET 7.5, Manriquez#2, Grade I]  [id number] SmBowelEnteroscopy [place]    [fent 50, prop 100] SB P 200->150    [sevo, prop 100, fent 20] VSS NAAC    [easy mask; ETT 7.5, Manriquez#2, Grade I]  [id number] EGD&PushEnteroscopy MAC    fent 25X4, prop 50 -> 100 mcg/kg/min    NAAC    ANES-RELATED MED/SURG  Patient Active Problem List   Diagnosis    Blood loss anemia    Angiodysplasia    Iron deficiency anemia due to chronic blood loss    Gastrointestinal hemorrhage    CAD (coronary artery disease)    ESRD (end stage renal disease)    Acute non intractable tension-type headache    Constipation    Upper GI bleeding    HTN (hypertension)    Normocytic anemia    T2DM (type 2 diabetes mellitus)     Past Medical History:   Diagnosis Date    Diabetes mellitus     Encounter for blood transfusion     Hemodialysis access, AV graft     Hypertension     Thyroid disease      Past Surgical History:   Procedure Laterality Date    graft placement        ALLERGIES  Review of patient's allergies indicates:  No Known Allergies    ANES-RELATED MAR [id number]  amlodipine  fluticasone   carvedilol      minoxidil  furosemide-PO 120bid  asa-81  ticagelor    levothyroxine pantoprazole-IV  insulin-SQ/SS    Anesthesia Evaluation    I have reviewed the Patient Summary Reports.     I have reviewed the Medications.     Review of Systems  Anesthesia Hx:  No problems with previous Anesthesia Denies Hx of Anesthetic complications   Denies Personal Hx of Anesthesia  complications.   Social:  Former Smoker, No Alcohol Use    Hematology/Oncology:         -- Anemia: Hematology Comments: thrombocytopenia   Cardiovascular:   Exercise tolerance: poor Hypertension CAD asymptomatic     Renal/:   Chronic Renal Disease, ESRD, Dialysis    Neurological:   Headaches    Endocrine:   Diabetes      Wt Readings from Last 1 Encounters:   08/26/18 81 kg (178 lb 9.2 oz)     Temp Readings from Last 1 Encounters:   08/27/18 36.7 °C (98.1 °F)     BP Readings from Last 1 Encounters:   08/27/18 138/62     Pulse Readings from Last 1 Encounters:   08/27/18 80     SpO2 Readings from Last 1 Encounters:   08/27/18 97%       Physical Exam  General:  Well nourished    Airway/Jaw/Neck:  Airway Findings:      Chest/Lungs:  Chest/Lungs Findings: Normal Respiratory Rate, Clear to auscultation     Heart/Vascular:  Heart Findings: Rate: Normal  Rhythm: Regular Rhythm        Mental Status:  Mental Status Findings:       Lab Results   Component Value Date    WBC 6.42 08/27/2018    HGB 7.3 (L) 08/27/2018    HCT 21.2 (L) 08/27/2018    MCV 89 08/27/2018     (L) 08/27/2018       Chemistry        Component Value Date/Time     08/27/2018 0330    K 4.0 08/27/2018 0330    CL 97 08/27/2018 0330    CO2 31 (H) 08/27/2018 0330    BUN 80 (H) 08/27/2018 0330    CREATININE 9.7 (H) 08/27/2018 0330    GLU 98 08/27/2018 0330        Component Value Date/Time    CALCIUM 8.6 (L) 08/27/2018 0330    ALKPHOS 42 (L) 08/27/2018 0330    AST 36 08/27/2018 0330    ALT 35 08/27/2018 0330    BILITOT 0.6 08/27/2018 0330    ESTGFRAFRICA 6 (A) 08/27/2018 0330    EGFRNONAA 5 (A) 08/27/2018 0330          Lab Results   Component Value Date    ALBUMIN 2.6 (L) 08/27/2018      Lab Results   Component Value Date    TSH 2.486 08/13/2018   No results found for: APTT  Lab Results   Component Value Date    INR 1.0 08/26/2018         CXR 20180813  Clear lungs.  Enlarged cardiac silhouette which may be in part related to AP view. Lungs grossly  clear    EKG 20180813  Normal sinus rhythm with sinus arrhythmia  T wave abnormality, consider lateral ischemia  Abnormal ECG  When compared with ECG of 22-JAN-2018 09:14, no significant change was found  Confirmed by Josue             Anesthesia Plan  Type of Anesthesia, risks & benefits discussed:  Anesthesia Type:  MAC, general  Patient's Preference:   Intra-op Monitoring Plan: standard ASA monitors  Intra-op Monitoring Plan Comments:   Post Op Pain Control Plan: multimodal analgesia  Post Op Pain Control Plan Comments:   Induction:    Beta Blocker:  Patient is not currently on a Beta-Blocker (No further documentation required).       Informed Consent: Patient understands risks and agrees with Anesthesia plan.  Questions answered. Anesthesia consent signed with patient.  ASA Score: 4     Day of Surgery Review of History & Physical:            Ready For Surgery From Anesthesia Perspective.

## 2018-08-27 NOTE — ASSESSMENT & PLAN NOTE
Receives HD on MTTF  Patient reports last dialyzed today in preparation for procedure tomorrow   Consulted nephro

## 2018-08-27 NOTE — PLAN OF CARE
"TN went to meet with patient, off floor at this time. Patient's wife at bedside. She reports patient's head was "thumping," that's how he knew his blood count was low. Patient is currently in dialysis, but he will GI procedure done today. Wife believes patient's PCP is Dr. Bustillo. Patient lives with his wife and is independent at home. He still drives. Wife will provide transportation home. TN will continue to follow.       08/27/18 1039   Discharge Assessment   Assessment Type Discharge Planning Assessment   Confirmed/corrected address and phone number on facesheet? Yes   Assessment information obtained from? Caregiver   Prior to hospitilization cognitive status: Alert/Oriented   Prior to hospitalization functional status: Independent   Current cognitive status: Alert/Oriented   Current Functional Status: Independent   Facility Arrived From: Home   Lives With spouse   Able to Return to Prior Arrangements yes   Is patient able to care for self after discharge? Yes   Who are your caregiver(s) and their phone number(s)? Karen Roque Spouse [phone]    Patient's perception of discharge disposition home or selfcare   Readmission Within The Last 30 Days unable to assess   Equipment Currently Used at Home glucometer   Do you have any problems affording any of your prescribed medications? No   Is the patient taking medications as prescribed? yes   Does the patient have transportation home? Yes   Transportation Available family or friend will provide   Dialysis Name and Scheduled days [facility]Home [facility]   Does the patient receive services at the [facility]? No   Discharge Plan A Home with family   Discharge Plan B Home;Home with family   Patient/Family In Agreement With Plan yes     Helene Theodore RN  Transition Navigator  [phone]  "

## 2018-08-27 NOTE — HOSPITAL COURSE
08/27/2018 NPO for colonoscopy today. Undergoing HD. Hemodynamically stable with HH of 7/23. Brilinta on hold for GI procedure.

## 2018-08-27 NOTE — TRANSFER OF CARE
"Anesthesia Transfer of Care Note    Patient: Ki Roque    Procedure(s) Performed: Procedure(s) (LRB):  ENTEROSCOPY, PROXIMAL-Upper SBE (N/A)    Patient location: GI    Anesthesia Type: MAC    Transport from OR: Transported from OR on room air with adequate spontaneous ventilation    Post pain: adequate analgesia    Post assessment: no apparent anesthetic complications    Post vital signs: stable    Level of consciousness: awake    Nausea/Vomiting: no nausea/vomiting    Complications: none    Transfer of care protocol was followed      Last vitals:   Visit Vitals  BP (!) 147/72   Pulse 77   Temp 37.2 °C (99 °F)   Resp 20   Ht 5' 6" (1.676 m)   Wt 81 kg (178 lb 9.2 oz)   SpO2 99%   BMI 28.82 kg/m²     "

## 2018-08-27 NOTE — PLAN OF CARE
TN went to meet with patient, wife at bedside. Patient is discharged to home.  No needs noted upon discharge. TN will call patient tomorrow for follow-up appointment. Wife will transport patient home.     0929--Update: TN spoke with Dr. Bustillo's office. They are a nephrology office, and they do not follow-up with patients. They only see patient's at dialysis. I called patient's wife and informed her of this. Patient is scheduled for a follow-up with another doctor this am. She will get that doctor to refer him to a PCP.    Follow-up With  Details  Why  Contact Info   Primary Care Physician  Schedule an appointment as soon as possible for a visit in 1 week  Primary Care Physician--Office Closed        08/27/18 1720   Final Note   Assessment Type Final Discharge Note   Discharge Disposition Home   What phone number can be called within the next 1-3 days to see how you are doing after discharge? [phone]   Hospital Follow Up  Appt(s) scheduled? (No, office closed. Will follow-up tomorrow.)   Right Care Referral Info   Post Acute Recommendation No Care     Helene Theodore RN  Transition Navigator  [phone]

## 2018-08-27 NOTE — ASSESSMENT & PLAN NOTE
SBP 130s-180s Titrate antihypertensives for goal BP <130/80  Continue Norvasc, doxazosin, Lasix.  Recommend up titration of Coreg

## 2018-08-27 NOTE — PLAN OF CARE
Problem: Patient Care Overview  Goal: Plan of Care Review  Pt presented to ER as per PCP directions for anemia.  Urinary cath present on admit.  Pt reported that his PCP placed a urinary cath 3 days ( ~ aug 23) ago for retention.  Morning Consults to: urology, nephrology and [facility]. Received 1 unit of blood with out any issue. Wife Ana at bedside.  , covered with 1 unit Novalog. Pt take oral DM meds at home.    Tele: NSR,  70 HR,  No alarms.     Bed in lowest position, wheels locked, non skid socks, ID band worn, personal items and call bell with in reach, bed alarm set.

## 2018-08-27 NOTE — PROGRESS NOTES
"PGY-2 Progress Note [facility]  Follow up for: GI bleed     Chief Complaint   Patient presents with    Abdominal Pain     68 year old male presents to ed cc of abdominal pain. patient states he has scheduled procedure with Dr. nicholson for tomorrow.      Hospital Stay Day 1    Subjective: Afebrile, BP elevated this AM. Patient reports continued distention of abdomen. Currently NPO for GI procedure.       Denies any CP, SOB, N/V/D, headaches, fever or chills.     Scheduled Meds:   sodium chloride 0.9%   Intravenous Once    amLODIPine  10 mg Oral Daily    aspirin  81 mg Oral Daily    atorvastatin  80 mg Oral QHS    calcitRIOL  0.5 mcg Oral Daily    carvedilol  3.125 mg Oral BID    cinacalcet  60 mg Oral Daily with breakfast    doxazosin  2 mg Oral QHS    epoetin neva (PROCRIT) injection  10,000 Units Intravenous Once in dialysis    furosemide  120 mg Oral BID    levothyroxine  50 mcg Oral Daily    pantoprazole 40 mg in dextrose 5 % 100 mL infusion (ready to mix system)  40 mg Intravenous BID    polyethylene glycol  17 g Oral BID    sevelamer carbonate  800 mg Oral TID WM    vitamin renal formula (B-complex-vitamin c-folic acid)  1 capsule Oral Daily     Continuous Infusions:  PRN Meds:sodium chloride, sodium chloride, sodium chloride 0.9%, dextrose 50%, dextrose 50%, glucagon (human recombinant), glucose, glucose, hydrALAZINE, insulin aspart U-100, sodium chloride 0.9%    Review of patient's allergies indicates:  No Known Allergies    Objectives:     Vitals(Most Recent)      BP  Min: 130/56  Max: 184/81  Temp  Av °F (36.7 °C)  Min: 96.8 °F (36 °C)  Max: 99.4 °F (37.4 °C)  Pulse  Av.2  Min: 76  Max: 131  Resp  Av.4  Min: 16  Max: 20  SpO2  Av.5 %  Min: 96 %  Max: 100 %  Height  Av' 6" (167.6 cm)  Min: 5' 6" (167.6 cm)  Max: 5' 6" (167.6 cm)  Weight  Av.2 kg (176 lb 12.6 oz)  Min: 79.4 kg (175 lb)  Max: 81 kg (178 lb 9.2 oz)             Vitals([id number])  Temp:  [96.8 °F (36 °C)-99.4 " °F (37.4 °C)]   Pulse:  []   Resp:  [16-20]   BP: (130-184)/()   SpO2:  [96 %-100 %]     I & O([id number])    Intake/Output Summary (Last 24 hours) at 8/27/2018 0919  Last data filed at 8/27/2018 0600  Gross per 24 hour   Intake 458 ml   Output 100 ml   Net 358 ml     Constitutional: He is oriented to person, place, and time. He appears well-developed and well-nourished. No distress.   HENT:   Head: Normocephalic and atraumatic.   Mouth/Throat: No oropharyngeal exudate.   Eyes: EOM are normal. Pupils are equal, round, and reactive to light. No scleral icterus.   Neck: Normal range of motion. Neck supple.   Cardiovascular: Normal rate, regular rhythm, normal heart sounds and intact distal pulses.   No murmur heard.  Pulmonary/Chest: Effort normal. No respiratory distress.   Abdominal: Soft. He exhibits distension. There is no tenderness. There is no guarding.   Musculoskeletal: Normal range of motion. He exhibits no edema.   Neurological: He is alert and oriented to person, place, and time. No cranial nerve deficit. He exhibits normal muscle tone. Coordination normal.   Skin: Skin is warm and dry. Capillary refill takes less than 2 seconds. He is not diaphoretic.   Psychiatric: He has a normal mood and affect. His behavior is normal.   Nursing note and vitals reviewed.    LABS  CBC  Recent Labs   Lab  08/26/18   1616  08/26/18   2231  08/27/18   0330   WBC  6.07   --   6.42   RBC  2.35*   --   2.51*   HGB  6.8*   --   7.3*   HCT  20.6*  23.0*  22.3*  22.3*   PLT  116*   --   117*   MCV  88   --   89   MCH  28.9   --   29.1   MCHC  33.0   --   32.7     BMP  Recent Labs   Lab  08/26/18   1616  08/27/18   0330   NA  137  138   K  3.5  4.0   CO2  30*  31*   CL  96  97   BUN  64*  80*   CREATININE  8.5*  9.7*   GLU  141*  98       POCT-Glucose  POCT Glucose   Date Value Ref Range Status   08/27/2018 101 70 - 110 mg/dL Final   08/26/2018 166 (H) 70 - 110 mg/dL Final       Recent Labs   Lab  08/26/18   0190   08/27/18   0330   CALCIUM  8.9  8.6*   MG   --   1.7   PHOS   --   4.3     LFT  Recent Labs   Lab  08/26/18   1616  08/27/18   0330   PROT  5.7*  5.4*   ALBUMIN  2.8*  2.6*   BILITOT  0.5  0.6   AST  26  36   ALKPHOS  45*  42*   ALT  22  35     COAGS  Recent Labs   Lab  08/26/18   1616   INR  1.0     LAST HbA1c  Lab Results   Component Value Date    HGBA1C 6.2 (H) 08/13/2018       Imaging- no new imaging     Assessment/Plan:   Gastrointestinal hemorrhage     Likely 2/2 to known hx of AVM  H/H of 6.8/20.6 on admit  - Received 1 unit pRBC  - H/H of 7.3/22.3 this AM  - Currently having HD and will be transfused 1 unit pRBC   - NPO  - Two large bore (> 18 gauge) IV obtained  - Protonix 40mg ordered  - Trending H/H q6h. Transfuse for goal Hb > 8 give cardiac history  - GI consulted and plan for scope this morning   - continue ASA therapy per GI        T2DM (type 2 diabetes mellitus)     -A1C 6.2  -Diet controlled at home  -SSI    - currently NPO for procedure        HTN (hypertension)     Continue home amlodipine and coreg   - BP elevated this AM   - Will have HD        ESRD (end stage renal disease)     Receives HD on MTTF  Patient reports last dialyzed Sunday in preparation for procedure   Consulted nephro   - Will receive HD this AM        CAD (coronary artery disease)     Recent stenting 8/6/2018  S/p DAPT of ASA and Brilinta   Maintained on ASA   Continue ASA therapy      PT/OT: ordered and following  Code: full  Diet: NPO until procedure   Ppx: dvt: held  Dispo: f/u GI and procedure     Case discussed with staff    Katlyn Boss MD  [facility] HO2  08/27/2018

## 2018-08-27 NOTE — ASSESSMENT & PLAN NOTE
GI following  Unfortunately, DAPT is needed with new stenting to RCA earlier this month  Consider Plavix instead of Brilinta, no load required

## 2018-08-27 NOTE — HPI
Ki Roque is a 69 yo with ESRD (HD MTTF), HTN, CAD s/p stenting (on 8/6/18 RCA on ASA and Brilinta) and previous GI bleed 2/2 small bowel. GI bleeding started 2017 and he had upper and lower endoscopies and pill endoscopy in [place], [place]. Patient seen by Dr. Guillen after he presented 8/9/18 with a H/H of 4.8 and 14.5, weakness, and melena.  The patient had a LHC done 8/6/18 and had a stent placed in his RCA and was started on Brillinta and aspirin (Unknown if ALBERTO vs BMS). His aspirin was held but the Brillinta continued.  EGD revealed gastritis, duodenitis and multiple small punctate ulcers, dieulafoy lesion with active bleeding in the proximal jejunum. Three hemostatic clips were placed after achieving hemostasis. Patient had required 9U PRBCs at that time. Patient was discharged home continue 6 months of DAPT for recent cardiac stents.  Patient reports since he was discharged, he has required 4 additional units of pRBCs at outside facility.  He contacted Dr. Guillen and was told to present to ED for admission and procedure on Monday.  Patient denies cardiac complaint and has been compliant with his cardiac medications. Presently on ASA alone for GI study. [facility] records unavailable for review.

## 2018-08-27 NOTE — [employer] PATIENT INSTRUCTIONS
Discharge Summary/Instructions after an Endoscopic Procedure  Patient Name: Ki Knight  Patient MRN: [id number]  Patient YOB: 1950 Monday, August 27, 2018  Moustapha Guillen MD  RESTRICTIONS:  During your procedure today, you received medications for sedation.  These   medications may affect your judgment, balance and coordination.  Therefore,   for 24 hours, you have the following restrictions:   - DO NOT drive a car, operate machinery, make legal/financial decisions,   sign important papers or drink alcohol.    ACTIVITY:  Today: no heavy lifting, straining or running due to procedural   sedation/anesthesia.  The following day: return to full activity including work.  DIET:  Eat and drink normally unless instructed otherwise.     TREATMENT FOR COMMON SIDE EFFECTS:  - Mild abdominal pain, nausea, belching, bloating or excessive gas:  rest,   eat lightly and use a heating pad.  - Sore Throat: treat with throat lozenges and/or gargle with warm salt   water.  - Because air was used during the procedure, expelling large amounts of air   from your rectum or belching is normal.  - If a bowel prep was taken, you may not have a bowel movement for 1-3 days.    This is normal.  SYMPTOMS TO WATCH FOR AND REPORT TO YOUR PHYSICIAN:  1. Abdominal pain or bloating, other than gas cramps.  2. Chest pain.  3. Back pain.  4. Signs of infection such as: chills or fever occurring within 24 hours   after the procedure.  5. Rectal bleeding, which would show as bright red, maroon, or black stools.   (A tablespoon of blood from the rectum is not serious, especially if   hemorrhoids are present.)  6. Vomiting.  7. Weakness or dizziness.  GO DIRECTLY TO THE NEAREST EMERGENCY ROOM IF YOU HAVE ANY OF THE FOLLOWING:      Difficulty breathing              Chills and/or fever over 101 F   Persistent vomiting and/or vomiting blood   Severe abdominal pain   Severe chest pain   Black, tarry stools   Bleeding- more than one  tablespoon   Any other symptom or condition that you feel may need urgent attention  Your doctor recommends these additional instructions:  If any biopsies were taken, your doctors clinic will contact you in 1 to 2   weeks with any results.  Discharge patient to home today   Begin Sandostatin monthly injections if anemia fails to improve   Thalidomide as next step if patient fails to respond with Sandostatin   Consider a 4th upper DBE on DAPT if needed as this patient's angioectasias   appear to be clustered in the proximal jejunum   Stop DAPT after 6 months of therapy for coronary stent  For questions, problems or results please call your physician - Moustapha Guillen MD at Work:  [phone].  EMERGENCY PHONE NUMBER: [phone],  LAB RESULTS: [phone]  IF A COMPLICATION OR EMERGENCY SITUATION ARISES AND YOU ARE UNABLE TO REACH   YOUR PHYSICIAN - GO DIRECTLY TO THE EMERGENCY ROOM.  MD Moustapha Prabhakar MD  8/27/2018 1:26:47 PM  This report has been verified and signed electronically.  [employer]

## 2018-08-27 NOTE — ASSESSMENT & PLAN NOTE
Likely 2/2 to known hx of AVM  Afebrile, /56, HR 82   H/H of 6.8/20.6 on admit  - NPO  - Two large bore (> 18 gauge) IV obtained  - Protonix 40mg ordered  - Trending H/H q6h. Transfuse for goal Hb > 8 give cardiac history  - GI consulted and plan for scope in AM  - continue ASA therapy per GI

## 2018-08-27 NOTE — NURSING
Received report from dialysis RN. Unit of blood administered and 400ml of fluid removed. Patient to be transported from dialysis to endo for procedure.

## 2018-08-27 NOTE — CONSULTS
[facility]  Cardiology  Consult Note    Patient Name: Ki Roque  MRN: [id number]  Admission Date: 8/26/2018  Hospital Length of Stay: 0 days  Code Status: Full Code   Attending Provider: Bubba Gonzalez MD   Consulting Provider: Warren Urena NP  Primary Care Physician: To Obtain Unable  Principal Problem:Gastrointestinal hemorrhage    Patient information was obtained from patient and ER records.     Inpatient consult to [facility]  Consult performed by: Warren Urena NP  Consult ordered by: Katlyn Boss MD        Subjective:     Chief Complaint:  GIB     HPI:   Ki Roque is a 69 yo with ESRD (HD MTTF), HTN, CAD s/p stenting (on 8/6/18 RCA on ASA and Brilinta) and previous GI bleed 2/2 small bowel. GI bleeding started 2017 and he had upper and lower endoscopies and pill endoscopy in [place], [place]. Patient seen by Dr. Guillen after he presented 8/9/18 with a H/H of 4.8 and 14.5, weakness, and melena.  The patient had a LHC done 8/6/18 and had a stent placed in his RCA and was started on Brillinta and aspirin (Unknown if ALBERTO vs BMS). His aspirin was held but the Brillinta continued.  EGD revealed gastritis, duodenitis and multiple small punctate ulcers, dieulafoy lesion with active bleeding in the proximal jejunum. Three hemostatic clips were placed after achieving hemostasis. Patient had required 9U PRBCs at that time. Patient was discharged home continue 6 months of DAPT for recent cardiac stents.  Patient reports since he was discharged, he has required 4 additional units of pRBCs at outside facility.  He contacted Dr. Guillen and was told to present to ED for admission and procedure on Monday.   Patient denies cardiac complaint and has been compliant with his cardiac medications. Presently on ASA alone for GI study. [facility] records unavailable for review.     Past Medical History:   Diagnosis Date    Diabetes mellitus     Encounter for blood transfusion      Hemodialysis access, AV graft     Hypertension     Thyroid disease        Past Surgical History:   Procedure Laterality Date    graft placement          Review of patient's allergies indicates:  No Known Allergies    No current facility-administered medications on file prior to encounter.      Current Outpatient Medications on File Prior to Encounter   Medication Sig    amLODIPine (NORVASC) 10 MG tablet Take 10 mg by mouth once daily.    aspirin 81 MG Chew Take 1 tablet (81 mg total) by mouth once daily.    atorvastatin (LIPITOR) 80 MG tablet Take 80 mg by mouth every evening.    calcitRIOL (ROCALTROL) 0.5 MCG Cap Take 0.5 mcg by mouth once daily.    CALCIUM ACETATE (PHOSLO ORAL) Take 2 tablets by mouth 2 (two) times daily before meals.    carvedilol (COREG) 3.125 MG tablet Take 3.125 mg by mouth 2 (two) times daily.    cinacalcet (SENSIPAR) 30 MG Tab Take 60 mg by mouth daily with breakfast.     doxazosin (CARDURA) 2 MG tablet Take 2 mg by mouth every evening.    epoetin neva (EPOGEN) 20,000 unit/mL injection Inject 20,000 Units into the skin 3 (three) times daily.    ferric citrate (AURYXIA) 210 mg iron Tab Take 210 mg by mouth 3 (three) times daily.    FOLIC ACID/VIT B COMPLEX AND C (LUIS-AMANDEEP ORAL) Take 1 tablet by mouth once daily.    furosemide (LASIX) 80 MG tablet Take 120 mg by mouth 2 (two) times daily.     glipiZIDE (GLUCOTROL) 5 MG tablet Take 5 mg by mouth 2 (two) times daily before meals.    levothyroxine (SYNTHROID) 50 MCG tablet Take 50 mcg by mouth once daily.    metOLazone (ZAROXOLYN) 5 MG tablet Take 5 mg by mouth once daily.    minoxidil (LONITEN) 2.5 MG tablet Take 2.5 mg by mouth 2 (two) times daily.     nebivolol (BYSTOLIC) 2.5 MG Tab Take 10 mg by mouth once daily.    pantoprazole (PROTONIX) 40 MG tablet Take 40 mg by mouth 2 (two) times daily.     pantoprazole (PROTONIX) 40 MG tablet Take 1 tablet (40 mg total) by mouth once daily.    polyethylene glycol (GLYCOLAX) 17  gram PwPk Take 17 g by mouth 2 (two) times daily.    ramipril (ALTACE) 10 MG capsule Take 10 mg by mouth once daily.    sodium bicarbonate 650 MG tablet Take 650 mg by mouth 2 (two) times daily.    ticagrelor (BRILINTA) 90 mg tablet Take 90 mg by mouth 2 (two) times daily.     Family History     Problem Relation (Age of Onset)    Cancer Father        Tobacco Use    Smoking status: Former Smoker     Last attempt to quit: 2009     Years since quittin.7    Smokeless tobacco: Never Used   Substance and Sexual Activity    Alcohol use: No    Drug use: No    Sexual activity: Yes     Partners: Female     Review of Systems   Constitution: Positive for weakness and malaise/fatigue. Negative for diaphoresis.   HENT: Negative.    Eyes: Negative.    Cardiovascular: Negative for chest pain, dyspnea on exertion, leg swelling, near-syncope, orthopnea, palpitations, paroxysmal nocturnal dyspnea and syncope.   Respiratory: Negative.  Negative for cough and shortness of breath.    Endocrine: Negative.    Hematologic/Lymphatic: Positive for bleeding problem.   Gastrointestinal: Positive for bloating and melena.   Psychiatric/Behavioral: Negative.    Allergic/Immunologic: Negative.      Objective:     Vital Signs (Most Recent):  Temp: 98 °F (36.7 °C) (18 1000)  Pulse: 80 (18 1000)  Resp: 16 (18 1000)  BP: 132/71 (18 1000)  SpO2: 97 % (18 0814) Vital Signs (24h Range):  Temp:  [96.8 °F (36 °C)-99.4 °F (37.4 °C)] 98 °F (36.7 °C)  Pulse:  [] 80  Resp:  [16-20] 16  SpO2:  [96 %-100 %] 97 %  BP: (120-184)/() 132/71     Weight: 81 kg (178 lb 9.2 oz)  Body mass index is 28.82 kg/m².    SpO2: 97 %  O2 Device (Oxygen Therapy): room air      Intake/Output Summary (Last 24 hours) at 2018 1013  Last data filed at 2018 1000  Gross per 24 hour   Intake 2959 ml   Output 100 ml   Net 2859 ml       Lines/Drains/Airways     Drain                 Hemodialysis AV Fistula Left upper arm  -- days          Peripheral Intravenous Line                 Peripheral IV - Single Lumen 08/26/18 1615 Right Upper Arm less than 1 day                Physical Exam   Constitutional: He is oriented to person, place, and time. He appears well-developed and well-nourished. No distress.   HENT:   Head: Normocephalic and atraumatic.   Eyes: Right eye exhibits no discharge. Left eye exhibits no discharge.   Neck: No JVD present.   Cardiovascular: Normal rate and regular rhythm.   Murmur heard.  Pulmonary/Chest: Effort normal and breath sounds normal.   Abdominal: Soft. Bowel sounds are normal.   Musculoskeletal: He exhibits no edema.   Neurological: He is alert and oriented to person, place, and time.   Skin: Skin is warm and dry. He is not diaphoretic.       Significant Labs:   BMP:   Recent Labs   Lab  08/26/18 1616 08/27/18   0330   GLU  141*  98   NA  137  138   K  3.5  4.0   CL  96  97   CO2  30*  31*   BUN  64*  80*   CREATININE  8.5*  9.7*   CALCIUM  8.9  8.6*   MG   --   1.7   , CMP   Recent Labs   Lab  08/26/18 1616 08/27/18   0330   NA  137  138   K  3.5  4.0   CL  96  97   CO2  30*  31*   GLU  141*  98   BUN  64*  80*   CREATININE  8.5*  9.7*   CALCIUM  8.9  8.6*   PROT  5.7*  5.4*   ALBUMIN  2.8*  2.6*   BILITOT  0.5  0.6   ALKPHOS  45*  42*   AST  26  36   ALT  22  35   ANIONGAP  11  10   ESTGFRAFRICA  7*  6*   EGFRNONAA  6*  5*   , CBC   Recent Labs   Lab  08/26/18 1616 08/27/18   0330  08/27/18   0933   WBC  6.07   --   6.42   --    HGB  6.8*   --   7.3*   --    HCT  20.6*   < >  22.3*  22.3*  21.2*   PLT  116*   --   117*   --     < > = values in this interval not displayed.   , INR   Recent Labs   Lab  08/26/18 1616   INR  1.0   , Lipid Panel No results for input(s): CHOL, HDL, LDLCALC, TRIG, CHOLHDL in the last 48 hours., Troponin No results for input(s): TROPONINI in the last 48 hours. and All pertinent lab results from the last 24 hours have been reviewed.    Significant Imaging:  Echocardiogram: 2D echo with color flow doppler: No results found for this or any previous visit.    Assessment and Plan:     * Gastrointestinal hemorrhage    GI following  Unfortunately, DAPT is needed with new stenting to RCA earlier this month  Consider Plavix instead of Brilinta, no load required          HTN (hypertension)    SBP 130s-180s Titrate antihypertensives for goal BP <130/80  Continue Norvasc, doxazosin, Lasix.  Recommend up titration of Coreg         CAD (coronary artery disease)    Patient with history of CAD and reported recent RCA stenting (08/06/2018) at outside facility, unknown if ALBERTO vs BMS  Obtain records from facility stent was placed to determine duration of DAPT, need [facility] report and 2DE  No stent card provided   Recommend resumption of Brilinta or initiate Plavix once cleared by GI  Continue asa, BB, statin  No ACEI/ARB given renal disease  Transfuse PRN, goal Hemoglobin >8                VTE Risk Mitigation (From admission, onward)        Ordered     IP VTE HIGH RISK PATIENT  Once      08/26/18 2005     Place LAMBERTO hose  Until discontinued      08/26/18 2005          Thank you for your consult. I will follow-up with patient. Please contact us if you have any additional questions.    Warren Urena, RAJWINDER  Cardiology   [facility]

## 2018-08-27 NOTE — NURSING
Patient discharged in wheelchair by escort services with all patient belongings. IV removed, no bleeding noted. Tele removed and returned. Discharge instructions and educational materials reviewed with patient and spouse. Patient and spouse verbalize clear understanding of all materials. No acute distress noted upon discharge.

## 2018-08-27 NOTE — PLAN OF CARE
Past Medical History:   Diagnosis Date    Diabetes mellitus     Encounter for blood transfusion     Hemodialysis access, AV graft     Hypertension     Thyroid disease      Exam completed as scheduled. Wife not present in dept, Dr. Guillen to go to patient's rooom to speak to her.   Patient returned to floor for ongoing in patient care. Report called to Melonie.

## 2018-08-27 NOTE — NURSING
Pt arrived via wheel chair, appeared to be in no distress. Wife at side.  To have endo scope to burn bleed in AM

## 2018-08-27 NOTE — ASSESSMENT & PLAN NOTE
Patient with history of CAD and reported recent RCA stenting (08/06/2018) at outside facility, unknown if ALBERTO vs BMS  Obtain records from facility stent was placed to determine duration of DAPT, need [facility] report and 2DE  Recommend resumption of Brilinta or initiate Plavix once cleared by GI  Continue asa, BB, statin  No ACEI/ARB given renal disease  Transfuse PRN, goal Hemoglobin >8

## 2018-08-27 NOTE — CONSULTS
"[facility]    CC: Melena    HPI 68 y.o. male with h/o of ESRD (HD MTTF), HTN, CAD s/p stenting (on 8/6/18 RCA on ASA and plavix) and previous GI bleed 2/2 small bowel dieulafoy lesion in proximal jejunum is presenting 1 week after discharge from previous admission for similar complaints. He states he had no bleeding for 2 days after discharge, but then began to have melanic stools daily requiring 4 transfusions of pRBC at OSH. His last BM was 2 days ago. He is currently on ASA and plavix (previously Brillinta), and his last dose of plavix was yesterday.     Past Medical History    has a past medical history of Diabetes mellitus, Encounter for blood transfusion, Hemodialysis access, AV graft, Hypertension, and Thyroid disease.      Review of Systems  General ROS: negative for - chills, fever or weight loss  Cardiovascular ROS: no chest pain or dyspnea on exertion  Gastrointestinal ROS: +abdominal pain, melena, denies diarrhea    Physical Examination  BP (!) 162/55 (BP Location: Right leg, Patient Position: Lying)   Pulse 77   Temp 97.7 °F (36.5 °C) (Oral)   Resp 17   Ht 5' 6" (1.676 m)   Wt 81 kg (178 lb 9.2 oz)   SpO2 97%   BMI 28.82 kg/m²   General appearance: alert, cooperative, no distress  HENT: Normocephalic, atraumatic, neck symmetrical, no nasal discharge   Lungs: clear to auscultation in all fields, symmetric chest wall expansion bilaterally, no wheeze, rale, or rhonchi  Heart: normal rate, regular rhythm without rub; palpable peripheral pulsesI   Abdomen: soft, non-tender; bowel sounds normoactive; no organomegaly  Extremities: extremities symmetric; no clubbing, cyanosis, or edema  Neurologic: Alert and oriented X 3, normal strength, normal coordination    Labs:  H&H 7.3/22.3   MCV 89  Plt 117  Alk phos 42  Alb/TP 2.6/5.4  t bili 0.6  Ast/alt 36/35    Imaging:  none    Assessment:   69 yo male with h/o small bowel dieulafoy lesions s/p APC and hemoclips 8/16, is presenting with recurrent " suspected UGIB requiring multiple transfusions. Last dose of plavix was yesterday.    Plan:  -NPO  -transfuse 1U pRBC with HD today prior to procedure  -IV PPI   -Plan for upper SBE today then likely discharge this afternoon

## 2018-08-27 NOTE — PLAN OF CARE
Problem: Hemodialysis (Adult)  Goal: Signs and Symptoms of Listed Potential Problems Will be Absent, Minimized or Managed (Hemodialysis)  Signs and symptoms of listed potential problems will be absent, minimized or managed by discharge/transition of care (reference Hemodialysis (Adult) CPG).   08/27/18 0913   Hemodialysis   Problems Assessed (Hemodialysis) electrolyte imbalance;fluid imbalance   Problems Present (Hemodialysis) electrolyte imbalance;fluid imbalance   HD with UF

## 2018-08-27 NOTE — PLAN OF CARE
Report received from DARLYN Wilhelm . Preoperative fasting confirmed appropriate for procedure and sedation. Patent IV access. Patient currently receiving HD will call when patient is back in room.

## 2018-08-27 NOTE — DISCHARGE INSTRUCTIONS
Upper GI Bleeding (Stable) (English) View Edit Remove  Clopidogrel Bisulfate Oral tablet (English) View Edit Remove  Diabetes, Diet (English) View Edit Remove

## 2018-08-27 NOTE — SUBJECTIVE & OBJECTIVE
Past Medical History:   Diagnosis Date    Diabetes mellitus     Encounter for blood transfusion     Hemodialysis access, AV graft     Hypertension     Thyroid disease        Past Surgical History:   Procedure Laterality Date    graft placement          Review of patient's allergies indicates:  No Known Allergies    No current facility-administered medications on file prior to encounter.      Current Outpatient Medications on File Prior to Encounter   Medication Sig    amLODIPine (NORVASC) 10 MG tablet Take 10 mg by mouth once daily.    aspirin 81 MG Chew Take 1 tablet (81 mg total) by mouth once daily.    atorvastatin (LIPITOR) 80 MG tablet Take 80 mg by mouth every evening.    calcitRIOL (ROCALTROL) 0.5 MCG Cap Take 0.5 mcg by mouth once daily.    CALCIUM ACETATE (PHOSLO ORAL) Take 2 tablets by mouth 2 (two) times daily before meals.    carvedilol (COREG) 3.125 MG tablet Take 3.125 mg by mouth 2 (two) times daily.    cinacalcet (SENSIPAR) 30 MG Tab Take 60 mg by mouth daily with breakfast.     doxazosin (CARDURA) 2 MG tablet Take 2 mg by mouth every evening.    epoetin neva (EPOGEN) 20,000 unit/mL injection Inject 20,000 Units into the skin 3 (three) times daily.    ferric citrate (AURYXIA) 210 mg iron Tab Take 210 mg by mouth 3 (three) times daily.    FOLIC ACID/VIT B COMPLEX AND C (LUIS-AMANDEEP ORAL) Take 1 tablet by mouth once daily.    furosemide (LASIX) 80 MG tablet Take 120 mg by mouth 2 (two) times daily.     glipiZIDE (GLUCOTROL) 5 MG tablet Take 5 mg by mouth 2 (two) times daily before meals.    levothyroxine (SYNTHROID) 50 MCG tablet Take 50 mcg by mouth once daily.    metOLazone (ZAROXOLYN) 5 MG tablet Take 5 mg by mouth once daily.    minoxidil (LONITEN) 2.5 MG tablet Take 2.5 mg by mouth 2 (two) times daily.     nebivolol (BYSTOLIC) 2.5 MG Tab Take 10 mg by mouth once daily.    pantoprazole (PROTONIX) 40 MG tablet Take 40 mg by mouth 2 (two) times daily.     pantoprazole  (PROTONIX) 40 MG tablet Take 1 tablet (40 mg total) by mouth once daily.    polyethylene glycol (GLYCOLAX) 17 gram PwPk Take 17 g by mouth 2 (two) times daily.    ramipril (ALTACE) 10 MG capsule Take 10 mg by mouth once daily.    sodium bicarbonate 650 MG tablet Take 650 mg by mouth 2 (two) times daily.    ticagrelor (BRILINTA) 90 mg tablet Take 90 mg by mouth 2 (two) times daily.     Family History     Problem Relation (Age of Onset)    Cancer Father        Tobacco Use    Smoking status: Former Smoker     Last attempt to quit: 2009     Years since quittin.7    Smokeless tobacco: Never Used   Substance and Sexual Activity    Alcohol use: No    Drug use: No    Sexual activity: Yes     Partners: Female     Review of Systems   Constitution: Positive for weakness and malaise/fatigue. Negative for diaphoresis.   HENT: Negative.    Eyes: Negative.    Cardiovascular: Negative for chest pain, dyspnea on exertion, leg swelling, near-syncope, orthopnea, palpitations, paroxysmal nocturnal dyspnea and syncope.   Respiratory: Negative.  Negative for cough and shortness of breath.    Endocrine: Negative.    Hematologic/Lymphatic: Positive for bleeding problem.   Gastrointestinal: Positive for bloating and melena.   Psychiatric/Behavioral: Negative.    Allergic/Immunologic: Negative.      Objective:     Vital Signs (Most Recent):  Temp: 98 °F (36.7 °C) (18 1000)  Pulse: 80 (18 1000)  Resp: 16 (18 1000)  BP: 132/71 (18 1000)  SpO2: 97 % (18 0814) Vital Signs (24h Range):  Temp:  [96.8 °F (36 °C)-99.4 °F (37.4 °C)] 98 °F (36.7 °C)  Pulse:  [] 80  Resp:  [16-20] 16  SpO2:  [96 %-100 %] 97 %  BP: (120-184)/() 132/71     Weight: 81 kg (178 lb 9.2 oz)  Body mass index is 28.82 kg/m².    SpO2: 97 %  O2 Device (Oxygen Therapy): room air      Intake/Output Summary (Last 24 hours) at 2018 1013  Last data filed at 2018 1000  Gross per 24 hour   Intake 2959 ml   Output 100  ml   Net 2859 ml       Lines/Drains/Airways     Drain                 Hemodialysis AV Fistula Left upper arm -- days          Peripheral Intravenous Line                 Peripheral IV - Single Lumen 08/26/18 1615 Right Upper Arm less than 1 day                Physical Exam   Constitutional: He is oriented to person, place, and time. He appears well-developed and well-nourished. No distress.   HENT:   Head: Normocephalic and atraumatic.   Eyes: Right eye exhibits no discharge. Left eye exhibits no discharge.   Neck: No JVD present.   Cardiovascular: Normal rate and regular rhythm.   Murmur heard.  Pulmonary/Chest: Effort normal and breath sounds normal.   Abdominal: Soft. Bowel sounds are normal.   Musculoskeletal: He exhibits no edema.   Neurological: He is alert and oriented to person, place, and time.   Skin: Skin is warm and dry. He is not diaphoretic.       Significant Labs:   BMP:   Recent Labs   Lab  08/26/18   1616 08/27/18   0330   GLU  141*  98   NA  137  138   K  3.5  4.0   CL  96  97   CO2  30*  31*   BUN  64*  80*   CREATININE  8.5*  9.7*   CALCIUM  8.9  8.6*   MG   --   1.7   , CMP   Recent Labs   Lab  08/26/18 1616 08/27/18   0330   NA  137  138   K  3.5  4.0   CL  96  97   CO2  30*  31*   GLU  141*  98   BUN  64*  80*   CREATININE  8.5*  9.7*   CALCIUM  8.9  8.6*   PROT  5.7*  5.4*   ALBUMIN  2.8*  2.6*   BILITOT  0.5  0.6   ALKPHOS  45*  42*   AST  26  36   ALT  22  35   ANIONGAP  11  10   ESTGFRAFRICA  7*  6*   EGFRNONAA  6*  5*   , CBC   Recent Labs   Lab  08/26/18   1616 08/27/18   0330  08/27/18   0933   WBC  6.07   --   6.42   --    HGB  6.8*   --   7.3*   --    HCT  20.6*   < >  22.3*  22.3*  21.2*   PLT  116*   --   117*   --     < > = values in this interval not displayed.   , INR   Recent Labs   Lab  08/26/18 1616   INR  1.0   , Lipid Panel No results for input(s): CHOL, HDL, LDLCALC, TRIG, CHOLHDL in the last 48 hours., Troponin No results for input(s): TROPONINI in the last 48  hours. and All pertinent lab results from the last 24 hours have been reviewed.    Significant Imaging: Echocardiogram: 2D echo with color flow doppler: No results found for this or any previous visit.

## 2018-08-27 NOTE — PT/OT/SLP PROGRESS
Physical Therapy  Missed Visit Note      Patient Name:  Ki Roque   MRN:  [id number]    PT made two attempts to complete the initial evaluation. First attempt at 1038, pt was off the floor in HD. Second attempt made at 1254: pt was off the floor for an endo procedure. Per RN pt will not be appropriate for PT evaluation for the remainder of the day. Will attempt initial evaluation tomorrow, August 28.    Scooby Bey, PT, DPT

## 2018-08-27 NOTE — SUBJECTIVE & OBJECTIVE
Past Medical History:   Diagnosis Date    Diabetes mellitus     Encounter for blood transfusion     Hemodialysis access, AV graft     Hypertension     Thyroid disease        Past Surgical History:   Procedure Laterality Date    graft placement          Review of patient's allergies indicates:  No Known Allergies    No current facility-administered medications on file prior to encounter.      Current Outpatient Medications on File Prior to Encounter   Medication Sig    amLODIPine (NORVASC) 10 MG tablet Take 10 mg by mouth once daily.    aspirin 81 MG Chew Take 1 tablet (81 mg total) by mouth once daily.    atorvastatin (LIPITOR) 80 MG tablet Take 80 mg by mouth every evening.    calcitRIOL (ROCALTROL) 0.5 MCG Cap Take 0.5 mcg by mouth once daily.    CALCIUM ACETATE (PHOSLO ORAL) Take 2 tablets by mouth 2 (two) times daily before meals.    carvedilol (COREG) 3.125 MG tablet Take 3.125 mg by mouth 2 (two) times daily.    cinacalcet (SENSIPAR) 30 MG Tab Take 60 mg by mouth daily with breakfast.     doxazosin (CARDURA) 2 MG tablet Take 2 mg by mouth every evening.    epoetin neva (EPOGEN) 20,000 unit/mL injection Inject 20,000 Units into the skin 3 (three) times daily.    ferric citrate (AURYXIA) 210 mg iron Tab Take 210 mg by mouth 3 (three) times daily.    FOLIC ACID/VIT B COMPLEX AND C (LUIS-AMANDEEP ORAL) Take 1 tablet by mouth once daily.    furosemide (LASIX) 80 MG tablet Take 120 mg by mouth 2 (two) times daily.     glipiZIDE (GLUCOTROL) 5 MG tablet Take 5 mg by mouth 2 (two) times daily before meals.    levothyroxine (SYNTHROID) 50 MCG tablet Take 50 mcg by mouth once daily.    metOLazone (ZAROXOLYN) 5 MG tablet Take 5 mg by mouth once daily.    minoxidil (LONITEN) 2.5 MG tablet Take 2.5 mg by mouth 2 (two) times daily.     nebivolol (BYSTOLIC) 2.5 MG Tab Take 10 mg by mouth once daily.    pantoprazole (PROTONIX) 40 MG tablet Take 40 mg by mouth 2 (two) times daily.     pantoprazole  (PROTONIX) 40 MG tablet Take 1 tablet (40 mg total) by mouth once daily.    polyethylene glycol (GLYCOLAX) 17 gram PwPk Take 17 g by mouth 2 (two) times daily.    ramipril (ALTACE) 10 MG capsule Take 10 mg by mouth once daily.    sodium bicarbonate 650 MG tablet Take 650 mg by mouth 2 (two) times daily.    ticagrelor (BRILINTA) 90 mg tablet Take 90 mg by mouth 2 (two) times daily.     Family History     Problem Relation (Age of Onset)    Cancer Father        Tobacco Use    Smoking status: Former Smoker     Last attempt to quit: 2009     Years since quittin.7    Smokeless tobacco: Never Used   Substance and Sexual Activity    Alcohol use: No    Drug use: No    Sexual activity: Yes     Partners: Female     Review of Systems   Constitutional: Positive for appetite change. Negative for chills, fatigue and fever.   HENT: Negative for congestion and sore throat.    Respiratory: Positive for shortness of breath. Negative for cough and chest tightness.    Cardiovascular: Negative for chest pain, palpitations and leg swelling.   Gastrointestinal: Positive for constipation. Negative for abdominal pain, blood in stool, diarrhea, nausea and vomiting.   Genitourinary: Negative for dysuria.        Catheter placed   Musculoskeletal: Negative for neck pain and neck stiffness.   Neurological: Positive for headaches. Negative for dizziness, weakness and light-headedness.   Psychiatric/Behavioral: Negative for agitation and behavioral problems.     Objective:     Vital Signs (Most Recent):  Temp: 96.8 °F (36 °C) (18)  Pulse: 80 (18)  Resp: 20 (18)  BP: (!) 183/77 (18)  SpO2: 99 % (18) Vital Signs (24h Range):  Temp:  [96.8 °F (36 °C)-99.4 °F (37.4 °C)] 96.8 °F (36 °C)  Pulse:  [76-82] 80  Resp:  [16-20] 20  SpO2:  [99 %-100 %] 99 %  BP: (130-183)/(56-77) 183/77     Weight: 81 kg (178 lb 9.2 oz)  Body mass index is 28.82 kg/m².    Physical Exam   Constitutional:  He is oriented to person, place, and time. He appears well-developed and well-nourished. No distress.   HENT:   Head: Normocephalic and atraumatic.   Mouth/Throat: No oropharyngeal exudate.   Eyes: EOM are normal. Pupils are equal, round, and reactive to light. No scleral icterus.   Neck: Normal range of motion. Neck supple.   Cardiovascular: Normal rate, regular rhythm, normal heart sounds and intact distal pulses.   No murmur heard.  Pulmonary/Chest: Effort normal. No respiratory distress.   Abdominal: Soft. He exhibits distension. There is no tenderness. There is no guarding.   Musculoskeletal: Normal range of motion. He exhibits no edema.   Neurological: He is alert and oriented to person, place, and time. No cranial nerve deficit. He exhibits normal muscle tone. Coordination normal.   Skin: Skin is warm and dry. Capillary refill takes less than 2 seconds. He is not diaphoretic.   Psychiatric: He has a normal mood and affect. His behavior is normal.   Nursing note and vitals reviewed.        CRANIAL NERVES     CN III, IV, VI   Pupils are equal, round, and reactive to light.  Extraocular motions are normal.        Significant Labs:   CBC:   Recent Labs   Lab  08/26/18   1616   WBC  6.07   HGB  6.8*   HCT  20.6*   PLT  116*     CMP:   Recent Labs   Lab  08/26/18   1616   NA  137   K  3.5   CL  96   CO2  30*   GLU  141*   BUN  64*   CREATININE  8.5*   CALCIUM  8.9   PROT  5.7*   ALBUMIN  2.8*   BILITOT  0.5   ALKPHOS  45*   AST  26   ALT  22   ANIONGAP  11   EGFRNONAA  6*     Coagulation:   Recent Labs   Lab  08/26/18   1616   INR  1.0     Significant Imaging: I have reviewed all pertinent imaging results/findings within the past 24 hours.

## 2018-08-27 NOTE — ANESTHESIA POSTPROCEDURE EVALUATION
"Anesthesia Post Evaluation    Patient: Ki Roque    Procedure(s) Performed: Procedure(s) (LRB):  ENTEROSCOPY, PROXIMAL-Upper SBE (N/A)    Final Anesthesia Type: MAC  Patient location during evaluation: GI PACU  Patient participation: Yes- Able to Participate  Level of consciousness: awake and alert  Post-procedure vital signs: reviewed and stable  Pain management: adequate  Airway patency: patent  PONV status at discharge: No PONV  Anesthetic complications: no      Cardiovascular status: blood pressure returned to baseline and hemodynamically stable  Respiratory status: unassisted, spontaneous ventilation and room air  Hydration status: euvolemic  Follow-up not needed.        Visit Vitals  BP (!) 147/72   Pulse 77   Temp 37.2 °C (99 °F)   Resp 20   Ht 5' 6" (1.676 m)   Wt 81 kg (178 lb 9.2 oz)   SpO2 99%   BMI 28.82 kg/m²       Pain/Megan Score: Pain Assessment Performed: Yes (8/27/2018  9:00 AM)  Presence of Pain: denies (8/27/2018  9:00 AM)        "

## 2018-08-27 NOTE — HPI
"Patient is a 67 yo male pmhx of ESRD (HD MTTF), HTN, CAD s/p stenting (on 8/6/18 RCA on ASA and plavix) and previous GI bleed 2/2 small bowel. He was recently admitted to our service and underwent EGD with Dr. Guillen that showed dieulafoy lesion with active bleeding in the proximal jejunum.  Three hemostatic clips were placed after achieving hemostasis.  Patient was discharged home to continue 6 months of DAPT for recent cardiac stents.  Patient reports since he was discharged, he's required 4 units of pRBC transfusion at outside facility.  He contacted Dr. Guillen and was told to present to ED for admission and procedure on Monday.  Patient reports he's been experiencing a "thumping" in his head which indicated his blood count was low.  He also reports shortness of breath and distention of abdomen.      He also reports he has fluid on him and doctor placed a catheter.  Patient receives HD on MTTF.            "

## 2018-08-27 NOTE — NURSING
Received report from DARLYN Rick. Patient back in room. Bed alarm on, bed locked and low, side rails up x2, and call bell in reach. IV clean, dry, and intact. Renal diet ordered and patient informed that he is now able to eat and drink.

## 2018-08-27 NOTE — PLAN OF CARE
I talked with GI and they want his home ASA to be continued tp help them visualize the bleed during his scope scheduled for tomorrow. He did take his home ASA this morning. He will get an ASA tomorrow morning before his scope. They do not want his brilinta continued at this time.    Yung Brannon MD  [facility]   08/26/2018

## 2018-08-28 NOTE — DISCHARGE SUMMARY
"[facility]  Discharge Summary      Admit Date: 8/26/2018    Discharge Date and Time: 8/27/2018  5:35 PM    Attending Physician: No att. providers found     Reason for Admission: Upper GI bleed    Procedures Performed: Procedure(s) (LRB):  ENTEROSCOPY, PROXIMAL-Upper SBE (N/A) per Dr. Guillen:  - Small but dense angioectasia with active bleeding in the proximal jejunum - ablated with APC  - Hemoclip in the proximal jejunum ~15cm distal to the site of ablation today - this site is the location of recent therapy for a seperate vascular lesion  - 68yr M with recurrent, severe anemia attributed to a combination of anemia of chronic disease/ESRD and recurrent bleeding from multiple dense angioectasias in the proximal jejunum. I have personally ablated a total of 3 dense angioectasias in the proximal jejunum on three separate occasions. His blood loss appears to be worse after initiation of DAPT for a recent coronary stent.    HPI on 8/26/18 per Dr. Boss:  Patient is a 69 yo male pmhx of ESRD (HD MTTF), HTN, CAD s/p stenting (on 8/6/18 RCA on ASA and plavix) and previous GI bleed 2/2 small bowel. He was recently admitted to our service and underwent EGD with Dr. Guillen that showed dieulafoy lesion with active bleeding in the proximal jejunum.  Three hemostatic clips were placed after achieving hemostasis.  Patient was discharged home to continue 6 months of DAPT for recent cardiac stents.  Patient reports since he was discharged, he's required 4 units of pRBC transfusion at outside facility.  He contacted Dr. Guillen and was told to present to ED for admission and procedure on Monday.  Patient reports he's been experiencing a "thumping" in his head which indicated his blood count was low.  He also reports shortness of breath and distention of abdomen.       He also reports he has fluid on him and doctor placed a catheter.  Patient receives HD on MTTF.    Hospital Course   On 8/26, pt presented to ED c/o " abdominal pain, black stool, but no hematemesis. He had been scheduled for outpatient endoscopy scheduled for the following morning however since he had required 4 units of blood over week prior he was asked to come into hospital for admission    Pt was admitted for anemia and gi bleed and scheduled endoscopy the following morning. GI was consulted and gave 1U blood prior to endoscopy, and endoscopy was performed by Dr. Guillen. Nephrology was consulted and recommended to remain on hemodialysis schedule. Cardiology was consulted and recommended Brilinta or Plavix for CAD and GI hemorrhage.       Consults: cardiology, GI and nephrology    Significant Diagnostic Studies:   Labs:   CMP   Recent Labs   Lab  08/26/18   1616  08/27/18   0330   NA  137  138   K  3.5  4.0   CL  96  97   CO2  30*  31*   GLU  141*  98   BUN  64*  80*   CREATININE  8.5*  9.7*   CALCIUM  8.9  8.6*   PROT  5.7*  5.4*   ALBUMIN  2.8*  2.6*   BILITOT  0.5  0.6   ALKPHOS  45*  42*   AST  26  36   ALT  22  35   ANIONGAP  11  10   ESTGFRAFRICA  7*  6*   EGFRNONAA  6*  5*    and CBC   Recent Labs   Lab  08/26/18   1616   08/27/18   0330  08/27/18   0933   WBC  6.07   --   6.42   --    HGB  6.8*   --   7.3*   --    HCT  20.6*   < >  22.3*  22.3*  21.2*   PLT  116*   --   117*   --     < > = values in this interval not displayed.       Final Diagnoses:    Principal Problem: Gastrointestinal hemorrhage   Secondary Diagnoses:   Active Hospital Problems    Diagnosis  POA    *Gastrointestinal hemorrhage [K92.2]  Yes    HTN (hypertension) [I10]  Yes    T2DM (type 2 diabetes mellitus) [E11.9]  Yes    CAD (coronary artery disease) [I25.10]  Yes    ESRD (end stage renal disease) [N18.6]  Yes      Resolved Hospital Problems   No resolved problems to display.       Discharged Condition: stable    Disposition: Home or Self Care    Follow Up/Patient Instructions:     Medications:  Reconciled Home Medications:      Medication List      START taking these  medications    clopidogrel 75 mg tablet  Commonly known as:  PLAVIX  Take 1 tablet (75 mg total) by mouth once daily.        CHANGE how you take these medications    pantoprazole 40 MG tablet  Commonly known as:  PROTONIX  Take 1 tablet (40 mg total) by mouth once daily.  What changed:  Another medication with the same name was removed. Continue taking this medication, and follow the directions you see here.        CONTINUE taking these medications    amLODIPine 10 MG tablet  Commonly known as:  NORVASC  Take 10 mg by mouth once daily.     aspirin 81 MG Chew  Take 1 tablet (81 mg total) by mouth once daily.     atorvastatin 80 MG tablet  Commonly known as:  LIPITOR  Take 80 mg by mouth every evening.     AURYXIA 210 mg iron Tab  Generic drug:  ferric citrate  Take 210 mg by mouth 3 (three) times daily.     BYSTOLIC 2.5 MG Tab  Generic drug:  nebivolol  Take 10 mg by mouth once daily.     calcitRIOL 0.5 MCG Cap  Commonly known as:  ROCALTROL  Take 0.5 mcg by mouth once daily.     carvedilol 3.125 MG tablet  Commonly known as:  COREG  Take 3.125 mg by mouth 2 (two) times daily.     doxazosin 2 MG tablet  Commonly known as:  CARDURA  Take 2 mg by mouth every evening.     EPOGEN 20,000 unit/mL injection  Generic drug:  epoetin neva  Inject 20,000 Units into the skin 3 (three) times daily.     furosemide 80 MG tablet  Commonly known as:  LASIX  Take 120 mg by mouth 2 (two) times daily.     glipiZIDE 5 MG tablet  Commonly known as:  GLUCOTROL  Take 5 mg by mouth 2 (two) times daily before meals.     levothyroxine 50 MCG tablet  Commonly known as:  SYNTHROID  Take 50 mcg by mouth once daily.     metOLazone 5 MG tablet  Commonly known as:  ZAROXOLYN  Take 5 mg by mouth once daily.     minoxidil 2.5 MG tablet  Commonly known as:  LONITEN  Take 2.5 mg by mouth 2 (two) times daily.     PHOSLO ORAL  Take 2 tablets by mouth 2 (two) times daily before meals.     polyethylene glycol 17 gram Pwpk  Commonly known as:  GLYCOLAX  Take  17 g by mouth 2 (two) times daily.     ramipril 10 MG capsule  Commonly known as:  ALTACE  Take 10 mg by mouth once daily.     LUIS-AMANDEEP ORAL  Take 1 tablet by mouth once daily.     SENSIPAR 30 MG Tab  Generic drug:  cinacalcet  Take 60 mg by mouth daily with breakfast.     sodium bicarbonate 650 MG tablet  Take 650 mg by mouth 2 (two) times daily.        STOP taking these medications    BRILINTA 90 mg tablet  Generic drug:  ticagrelor          Discharge Procedure Orders   Diet Cardiac     Diet renal     Notify your health care provider if you experience any of the following:  severe uncontrolled pain     Notify your health care provider if you experience any of the following:  redness, tenderness, or signs of infection (pain, swelling, redness, odor or green/yellow discharge around incision site)     Notify your health care provider if you experience any of the following:  persistent dizziness, light-headedness, or visual disturbances     Notify your health care provider if you experience any of the following:  increased confusion or weakness     Activity as tolerated     Follow-up Information     Primary Care Physician. Schedule an appointment as soon as possible for a visit in 1 week.    Why:  Primary Care Physician--Office Closed              > 30 minutes spent on this discharge.     Clara Andrew, DO

## 2018-10-17 ENCOUNTER — TELEPHONE (OUTPATIENT)
Dept: NEUROLOGY | Facility: HOSPITAL | Age: 68
End: 2018-10-17

## 2018-10-17 NOTE — TELEPHONE ENCOUNTER
Pt notified denial letter for Sandostatin not received.  Pt to fax denial letter to this clinic.  Pt given clinic fax..

## 2018-10-17 NOTE — TELEPHONE ENCOUNTER
----- Message from Nelida Nielsen sent at 10/17/2018  2:27 PM CDT -----  Contact: patient  GI- patient called regarding his insurance denying the injections that Dr Guillen ordered Patient stated that he would like to speak to nurse regarding resubmitting it and Dr Guillen needed to appeal. # [phone]

## 2018-11-05 ENCOUNTER — TELEPHONE (OUTPATIENT)
Dept: NEUROLOGY | Facility: HOSPITAL | Age: 68
End: 2018-11-05

## 2018-11-05 NOTE — TELEPHONE ENCOUNTER
Mima with [facility], pt's hgb as follows: 10/22/18-10.1, 10/29/18-8.2, today, 11/5/18-6.5.  Orders placed transfused 2 units today. Results forwarded to Dr. Guillen.

## 2018-11-07 ENCOUNTER — TELEPHONE (OUTPATIENT)
Dept: NEUROLOGY | Facility: HOSPITAL | Age: 68
End: 2018-11-07

## 2018-11-07 DIAGNOSIS — K92.2 UPPER GASTROINTESTINAL BLEEDING: Primary | ICD-10-CM

## 2018-11-07 NOTE — TELEPHONE ENCOUNTER
Upper SBE scheduled with pt on Thursday, November 15, 2015.  Pt instructed to eat light meals on Wednesday, November 14, 2018 with nothing to eat or drink after midnight.  Pt notified he will be contacted by Endoscopy staff with arrival time.  Pt acknowledged understanding.

## 2018-11-07 NOTE — TELEPHONE ENCOUNTER
----- Message from Venessa Rincon sent at 11/7/2018  2:06 PM CST -----  Contact: Patient  GI:  Patient called, he got 2 more pints of blood yesterday.  He is wanting to be seen as soon as possible - not sure if Dr. Guillen needs to see him in clinic again.  He can be reached at [phone] or [phone].  Thank you  abc

## 2018-11-08 ENCOUNTER — TELEPHONE (OUTPATIENT)
Dept: NEUROLOGY | Facility: HOSPITAL | Age: 68
End: 2018-11-08

## 2018-11-08 NOTE — TELEPHONE ENCOUNTER
Pt having abdominal pain and bleeding.  Pt had blood transfusion on Tuesday, 11/6/18. Pt feels he cannot wait until next week for upper sbe as scheduled. Pt instructed to go to ER.  Pt requesting to come to [facility] and have a procedure today.  Pt notified his request will be forwarded to Dr. Guillen.

## 2018-11-13 ENCOUNTER — TELEPHONE (OUTPATIENT)
Dept: ENDOSCOPY | Facility: HOSPITAL | Age: 68
End: 2018-11-13

## 2018-11-13 NOTE — TELEPHONE ENCOUNTER
Spoke with patient about arrival time @ 0700.     NPO status reviewed: Patient must have nothing to eat after midnight.      Medications: Do not take Insulin or oral diabetic medications the day of the procedure.  Take as prescribed: heart, seizure and blood pressure medication in the morning with a sip of water (less than an ounce).  Take any breathing medications and bring inhalers to hospital with you Leave all valuables and jewelry at home.     Wear comfortable clothes to procedure to change into hospital gown You cannot drive for 24 hours after your procedure because you will receive sedation for your procedure to make you comfortable.  A ride must be provided at discharge.

## 2018-11-14 ENCOUNTER — TELEPHONE (OUTPATIENT)
Dept: NEUROLOGY | Facility: HOSPITAL | Age: 68
End: 2018-11-14

## 2018-11-15 ENCOUNTER — ANESTHESIA (OUTPATIENT)
Dept: ENDOSCOPY | Facility: HOSPITAL | Age: 68
End: 2018-11-15
Payer: MEDICARE

## 2018-11-15 ENCOUNTER — HOSPITAL ENCOUNTER (OUTPATIENT)
Facility: HOSPITAL | Age: 68
Discharge: HOME | End: 2018-11-15
Attending: INTERNAL MEDICINE | Admitting: INTERNAL MEDICINE
Payer: MEDICARE

## 2018-11-15 ENCOUNTER — ANESTHESIA EVENT (OUTPATIENT)
Dept: ENDOSCOPY | Facility: HOSPITAL | Age: 68
End: 2018-11-15
Payer: MEDICARE

## 2018-11-15 VITALS
TEMPERATURE: 98 F | BODY MASS INDEX: 28.28 KG/M2 | OXYGEN SATURATION: 97 % | WEIGHT: 176 LBS | RESPIRATION RATE: 20 BRPM | HEART RATE: 72 BPM | HEIGHT: 66 IN | SYSTOLIC BLOOD PRESSURE: 138 MMHG | DIASTOLIC BLOOD PRESSURE: 65 MMHG

## 2018-11-15 VITALS — OXYGEN SATURATION: 94 % | SYSTOLIC BLOOD PRESSURE: 158 MMHG | DIASTOLIC BLOOD PRESSURE: 74 MMHG | HEART RATE: 77 BPM

## 2018-11-15 DIAGNOSIS — D50.0 BLOOD LOSS ANEMIA: Primary | ICD-10-CM

## 2018-11-15 DIAGNOSIS — D50.0 IRON DEFICIENCY ANEMIA DUE TO CHRONIC BLOOD LOSS: ICD-10-CM

## 2018-11-15 LAB
ANION GAP SERPL CALC-SCNC: 16 MMOL/L
BUN SERPL-MCNC: 38 MG/DL
CALCIUM SERPL-MCNC: 9.4 MG/DL
CHLORIDE SERPL-SCNC: 97 MMOL/L
CO2 SERPL-SCNC: 29 MMOL/L
CREAT SERPL-MCNC: 11.2 MG/DL
EST. GFR  (AFRICAN AMERICAN): 5 ML/MIN/1.73 M^2
EST. GFR  (NON AFRICAN AMERICAN): 4 ML/MIN/1.73 M^2
GLUCOSE SERPL-MCNC: 87 MG/DL
POTASSIUM SERPL-SCNC: 3.2 MMOL/L
SODIUM SERPL-SCNC: 142 MMOL/L

## 2018-11-15 PROCEDURE — 44378 SMALL BOWEL ENDOSCOPY: CPT | Performed by: INTERNAL MEDICINE

## 2018-11-15 PROCEDURE — 63600175 PHARM REV CODE 636 W HCPCS: Performed by: NURSE ANESTHETIST, CERTIFIED REGISTERED

## 2018-11-15 PROCEDURE — 36415 COLL VENOUS BLD VENIPUNCTURE: CPT

## 2018-11-15 PROCEDURE — 37000008 HC ANESTHESIA 1ST 15 MINUTES: Performed by: INTERNAL MEDICINE

## 2018-11-15 PROCEDURE — 25000003 PHARM REV CODE 250: Performed by: NURSE ANESTHETIST, CERTIFIED REGISTERED

## 2018-11-15 PROCEDURE — 37000009 HC ANESTHESIA EA ADD 15 MINS: Performed by: INTERNAL MEDICINE

## 2018-11-15 PROCEDURE — 80048 BASIC METABOLIC PNL TOTAL CA: CPT

## 2018-11-15 PROCEDURE — 25000003 PHARM REV CODE 250: Performed by: INTERNAL MEDICINE

## 2018-11-15 PROCEDURE — 27201238 HC BALLOON, OVERTUBE (ANY): Performed by: INTERNAL MEDICINE

## 2018-11-15 PROCEDURE — 27202087 HC PROBE, APC: Performed by: INTERNAL MEDICINE

## 2018-11-15 RX ORDER — ONDANSETRON HYDROCHLORIDE 2 MG/ML
INJECTION, SOLUTION INTRAMUSCULAR; INTRAVENOUS
Status: DISCONTINUED | OUTPATIENT
Start: 2018-11-15 | End: 2018-11-15

## 2018-11-15 RX ORDER — SODIUM CHLORIDE 9 MG/ML
INJECTION, SOLUTION INTRAVENOUS CONTINUOUS
Status: DISCONTINUED | OUTPATIENT
Start: 2018-11-15 | End: 2018-11-15 | Stop reason: HOSPADM

## 2018-11-15 RX ORDER — SUCCINYLCHOLINE CHLORIDE 20 MG/ML
INJECTION INTRAMUSCULAR; INTRAVENOUS
Status: DISCONTINUED | OUTPATIENT
Start: 2018-11-15 | End: 2018-11-15

## 2018-11-15 RX ORDER — PHENYLEPHRINE HYDROCHLORIDE 10 MG/ML
INJECTION INTRAVENOUS
Status: DISCONTINUED | OUTPATIENT
Start: 2018-11-15 | End: 2018-11-15

## 2018-11-15 RX ORDER — ROCURONIUM BROMIDE 10 MG/ML
INJECTION, SOLUTION INTRAVENOUS
Status: DISCONTINUED | OUTPATIENT
Start: 2018-11-15 | End: 2018-11-15

## 2018-11-15 RX ORDER — SODIUM CHLORIDE 0.9 % (FLUSH) 0.9 %
3 SYRINGE (ML) INJECTION
Status: DISCONTINUED | OUTPATIENT
Start: 2018-11-15 | End: 2018-11-15 | Stop reason: HOSPADM

## 2018-11-15 RX ORDER — FENTANYL CITRATE 50 UG/ML
INJECTION, SOLUTION INTRAMUSCULAR; INTRAVENOUS
Status: DISCONTINUED | OUTPATIENT
Start: 2018-11-15 | End: 2018-11-15

## 2018-11-15 RX ORDER — LIDOCAINE HCL/PF 100 MG/5ML
SYRINGE (ML) INTRAVENOUS
Status: DISCONTINUED | OUTPATIENT
Start: 2018-11-15 | End: 2018-11-15

## 2018-11-15 RX ORDER — ONDANSETRON 2 MG/ML
4 INJECTION INTRAMUSCULAR; INTRAVENOUS DAILY PRN
Status: DISCONTINUED | OUTPATIENT
Start: 2018-11-15 | End: 2018-11-15 | Stop reason: HOSPADM

## 2018-11-15 RX ORDER — PROPOFOL 10 MG/ML
VIAL (ML) INTRAVENOUS
Status: DISCONTINUED | OUTPATIENT
Start: 2018-11-15 | End: 2018-11-15

## 2018-11-15 RX ADMIN — PROPOFOL 30 MG: 10 INJECTION, EMULSION INTRAVENOUS at 09:11

## 2018-11-15 RX ADMIN — PHENYLEPHRINE HYDROCHLORIDE 100 MCG: 10 INJECTION INTRAVENOUS at 09:11

## 2018-11-15 RX ADMIN — ONDANSETRON 4 MG: 2 INJECTION, SOLUTION INTRAMUSCULAR; INTRAVENOUS at 09:11

## 2018-11-15 RX ADMIN — PROPOFOL 100 MG: 10 INJECTION, EMULSION INTRAVENOUS at 08:11

## 2018-11-15 RX ADMIN — SODIUM CHLORIDE: 0.9 INJECTION, SOLUTION INTRAVENOUS at 07:11

## 2018-11-15 RX ADMIN — FENTANYL CITRATE 100 MCG: 50 INJECTION, SOLUTION INTRAMUSCULAR; INTRAVENOUS at 08:11

## 2018-11-15 RX ADMIN — ROCURONIUM BROMIDE 5 MG: 10 INJECTION, SOLUTION INTRAVENOUS at 08:11

## 2018-11-15 RX ADMIN — PROPOFOL 20 MG: 10 INJECTION, EMULSION INTRAVENOUS at 09:11

## 2018-11-15 RX ADMIN — SUCCINYLCHOLINE CHLORIDE 140 MG: 20 INJECTION, SOLUTION INTRAMUSCULAR; INTRAVENOUS at 08:11

## 2018-11-15 RX ADMIN — LIDOCAINE HYDROCHLORIDE 80 MG: 20 INJECTION, SOLUTION INTRAVENOUS at 08:11

## 2018-11-15 RX ADMIN — SODIUM CHLORIDE: 0.9 INJECTION, SOLUTION INTRAVENOUS at 08:11

## 2018-11-15 NOTE — H&P
"[facility]    CC: anemia    HPI 68 y.o. male here with anemia on plavix.       Past Medical History:   Diagnosis Date    Diabetes mellitus     Encounter for blood transfusion     Hemodialysis access, AV graft     Hypertension     Thyroid disease          Review of Systems  General ROS: negative for chills, fever or weight loss  Cardiovascular ROS: no chest pain or dyspnea on exertion  Gastrointestinal ROS: no abdominal pain, change in bowel habits, or black/ bloody stools    Physical Examination  BP (!) 178/78   Pulse 81   Temp 98.1 °F (36.7 °C)   Resp 18   Ht 5' 6" (1.676 m)   Wt 79.8 kg (176 lb)   SpO2 96%   BMI 28.41 kg/m²   General appearance: alert, cooperative, no distress  HENT: Normocephalic, atraumatic, neck symmetrical, no nasal discharge   Lungs: clear to auscultation bilaterally, no dullness to percussion bilaterally  Heart: regular rate and rhythm without rub; no displacement of the PMI   Abdomen: soft, non-tender; bowel sounds normoactive; no organomegaly  Extremities: extremities symmetric; no clubbing, cyanosis, or edema  Neurologic: Alert and oriented X 3, normal strength, normal coordination and gait    Labs:  Lab Results   Component Value Date    WBC 6.42 08/27/2018    HGB 7.3 (L) 08/27/2018    HCT 21.2 (L) 08/27/2018    MCV 89 08/27/2018     (L) 08/27/2018         Imaging: none    Assessment:   Anemia on plavix    Plan:  Upper SBE today    Moustapha Guillen MD   [address], [address]   [place] [address] [phone]    "

## 2018-11-15 NOTE — ANESTHESIA POSTPROCEDURE EVALUATION
"Anesthesia Post Evaluation    Patient: Ki Roque    Procedure(s) Performed: Procedure(s) (LRB):  ENTEROSCOPY, upper sbe (N/A)    Final Anesthesia Type: general  Patient location during evaluation: PACU  Patient participation: Yes- Able to Participate  Level of consciousness: awake and alert  Post-procedure vital signs: reviewed and stable  Pain management: adequate  Airway patency: patent  PONV status at discharge: No PONV  Anesthetic complications: no      Cardiovascular status: hemodynamically stable and blood pressure returned to baseline  Respiratory status: unassisted, room air and spontaneous ventilation  Hydration status: euvolemic  Follow-up not needed.        Visit Vitals  /65   Pulse 72   Temp 36.5 °C (97.7 °F) (Temporal)   Resp 20   Ht 5' 6" (1.676 m)   Wt 79.8 kg (176 lb)   SpO2 97%   BMI 28.41 kg/m²       Pain/Megan Score: Pain Assessment Performed: Yes (11/15/2018 11:25 AM)  Presence of Pain: denies (11/15/2018 11:25 AM)  Megan Score: 9 (11/15/2018 11:25 AM)        "

## 2018-11-15 NOTE — ANESTHESIA PREPROCEDURE EVALUATION
11/15/2018  Ki Roque is a 68 y.o., male w GI angiodysplasia & GI blood loss anemia; admitted to ICU->med/surg floor; transfused this admission; had prox enteroscopy GA [id number]; now for ENTEROSCOPY, PROXIMAL-Upper SBE (N/A ) (GA).    PRIOR ANES (in Epic) 20180815 [id number] Upper_GI_Endoscopy [place]    [fent 50, etom 12] ->110 (w some sevo)    [sevo] SBP 90<->180    [ø mask vent; ET 7.5, Manriquez#2, Grade I]  [id number] SmBowelEnteroscopy [place]    [fent 50, prop 100] SB P 200->150    [sevo, prop 100, fent 20] VSS NAAC    [easy mask; ETT 7.5, Manriquez#2, Grade I]  20171214 EGD&PushEnteroscopy MAC    fent 25X4, prop 50 -> 100 mcg/kg/min    NAAC    ANES-RELATED MED/SURG  Patient Active Problem List   Diagnosis    Blood loss anemia    Angiodysplasia    Iron deficiency anemia due to chronic blood loss    Gastrointestinal hemorrhage    CAD (coronary artery disease)    ESRD (end stage renal disease)    Acute non intractable tension-type headache    Constipation    Upper GI bleeding    HTN (hypertension)    Normocytic anemia    T2DM (type 2 diabetes mellitus)     Past Medical History:   Diagnosis Date    Diabetes mellitus     Encounter for blood transfusion     Hemodialysis access, AV graft     Hypertension     Thyroid disease        ALLERGIES  Review of patient's allergies indicates:  No Known Allergies    ANES-RELATED MAR [id number]  amlodipine  fluticasone   carvedilol      minoxidil  furosemide-PO 120bid  asa-81  ticagelor    levothyroxine pantoprazole-IV  insulin-SQ/SS    Pre-op Assessment    I have reviewed the Patient Summary Reports.      I have reviewed the Medications.     Review of Systems  Anesthesia Hx:  No problems with previous Anesthesia Denies Hx of Anesthetic complications   Denies Personal Hx of Anesthesia complications.   Social:  Former Smoker, No Alcohol Use     Hematology/Oncology:         -- Anemia: Hematology Comments: thrombocytopenia   Cardiovascular:   Exercise tolerance: poor Hypertension CAD asymptomatic CABG/stent (stents)     Renal/:   Chronic Renal Disease, ESRD, Dialysis    Neurological:   Headaches    Endocrine:   Diabetes      Wt Readings from Last 1 Encounters:   11/15/18 79.8 kg (176 lb)     Temp Readings from Last 1 Encounters:   11/15/18 36.7 °C (98.1 °F)     BP Readings from Last 1 Encounters:   11/15/18 (!) 178/78     Pulse Readings from Last 1 Encounters:   11/15/18 81     SpO2 Readings from Last 1 Encounters:   11/15/18 96%       Physical Exam  General:  Well nourished    Airway/Jaw/Neck:  Airway Findings:      Chest/Lungs:  Chest/Lungs Findings: Normal Respiratory Rate, Clear to auscultation     Heart/Vascular:  Heart Findings: Rate: Normal  Rhythm: Regular Rhythm        Mental Status:  Mental Status Findings:       Lab Results   Component Value Date    WBC 6.42 08/27/2018    HGB 7.3 (L) 08/27/2018    HCT 21.2 (L) 08/27/2018    MCV 89 08/27/2018     (L) 08/27/2018       Chemistry        Component Value Date/Time     11/15/2018 0731    K 3.2 (L) 11/15/2018 0731    CL 97 11/15/2018 0731    CO2 29 11/15/2018 0731    BUN 38 (H) 11/15/2018 0731    CREATININE 11.2 (H) 11/15/2018 0731    GLU 87 11/15/2018 0731        Component Value Date/Time    CALCIUM 9.4 11/15/2018 0731    ALKPHOS 42 (L) 08/27/2018 0330    AST 36 08/27/2018 0330    ALT 35 08/27/2018 0330    BILITOT 0.6 08/27/2018 0330    ESTGFRAFRICA 5 (A) 11/15/2018 0731    EGFRNONAA 4 (A) 11/15/2018 0731          Lab Results   Component Value Date    ALBUMIN 2.6 (L) 08/27/2018      Lab Results   Component Value Date    TSH 2.486 08/13/2018   No results found for: APTT  Lab Results   Component Value Date    INR 1.0 08/26/2018         CXR 20180813  Clear lungs.  Enlarged cardiac silhouette which may be in part related to AP view. Lungs grossly clear    EKG 20180813  Normal sinus rhythm  with sinus arrhythmia  T wave abnormality, consider lateral ischemia  Abnormal ECG  When compared with ECG of 22-JAN-2018 09:14, no significant change was found  Confirmed by Josue             Anesthesia Plan  Type of Anesthesia, risks & benefits discussed:  Anesthesia Type:  MAC, general  Patient's Preference:   Intra-op Monitoring Plan: standard ASA monitors  Intra-op Monitoring Plan Comments:   Post Op Pain Control Plan: multimodal analgesia  Post Op Pain Control Plan Comments:   Induction:    Beta Blocker:  Patient is not currently on a Beta-Blocker (No further documentation required).       Informed Consent: Patient understands risks and agrees with Anesthesia plan.  Questions answered. Anesthesia consent signed with patient.  ASA Score: 4     Day of Surgery Review of History & Physical:            Ready For Surgery From Anesthesia Perspective.

## 2018-11-15 NOTE — PLAN OF CARE
Past Medical History:   Diagnosis Date    Diabetes mellitus     Encounter for blood transfusion     Hemodialysis access, AV graft     Hypertension     Thyroid disease      Procedure completed as planned. No complications noted post procedure. Dr. Guillen        visited at bedside, discussed findings and recommendations with patient and family member; all questions asked and answered. Verbalized understanding of information give. Handout provided at time of discharge.

## 2018-11-15 NOTE — PLAN OF CARE
Spoke with Allyson in the lab. States that she has received the stat lab specimen at this time. Stated that she will call back and update the department. Dr Atkins updated with this information.

## 2018-11-15 NOTE — [employer] PATIENT INSTRUCTIONS
Discharge Summary/Instructions after an Endoscopic Procedure  Patient Name: Ki Knight  Patient MRN: [id number]  Patient YOB: 1950  Thursday, November 15, 2018  Moustapha Guillen MD  RESTRICTIONS:  During your procedure today, you received medications for sedation.  These   medications may affect your judgment, balance and coordination.  Therefore,   for 24 hours, you have the following restrictions:   - DO NOT drive a car, operate machinery, make legal/financial decisions,   sign important papers or drink alcohol.    ACTIVITY:  Today: no heavy lifting, straining or running due to procedural   sedation/anesthesia.  The following day: return to full activity including work.  DIET:  Eat and drink normally unless instructed otherwise.     TREATMENT FOR COMMON SIDE EFFECTS:  - Mild abdominal pain, nausea, belching, bloating or excessive gas:  rest,   eat lightly and use a heating pad.  - Sore Throat: treat with throat lozenges and/or gargle with warm salt   water.  - Because air was used during the procedure, expelling large amounts of air   from your rectum or belching is normal.  - If a bowel prep was taken, you may not have a bowel movement for 1-3 days.    This is normal.  SYMPTOMS TO WATCH FOR AND REPORT TO YOUR PHYSICIAN:  1. Abdominal pain or bloating, other than gas cramps.  2. Chest pain.  3. Back pain.  4. Signs of infection such as: chills or fever occurring within 24 hours   after the procedure.  5. Rectal bleeding, which would show as bright red, maroon, or black stools.   (A tablespoon of blood from the rectum is not serious, especially if   hemorrhoids are present.)  6. Vomiting.  7. Weakness or dizziness.  GO DIRECTLY TO THE NEAREST EMERGENCY ROOM IF YOU HAVE ANY OF THE FOLLOWING:      Difficulty breathing              Chills and/or fever over 101 F   Persistent vomiting and/or vomiting blood   Severe abdominal pain   Severe chest pain   Black, tarry stools   Bleeding- more than one  tablespoon   Any other symptom or condition that you feel may need urgent attention  Your doctor recommends these additional instructions:  If any biopsies were taken, your doctors clinic will contact you in 1 to 2   weeks with any results.  Reapply for sandostatin if anemia fails to improve   Stop DAPT on January 1, 2019 to complete 6 months of therapy after stent   placement then treat with aspirin monotherapy   Thalidomide 50mg bid as next step if blood counts fail to stabilize with   sandostatin and off plavix   Discharge to home   Condition stable   Resume previous diet   The signs and symptoms of potential delayed complications were discussed   with the patient. If signs or symptoms of these complications develop, call   the [facility] at 1 [phone].   Return to normal activities tomorrow.  Written discharge instructions were   provided to the patient.   - Discharge patient to home.  For questions, problems or results please call your physician - Moustapha Guillen MD at Work:  [phone].  EMERGENCY PHONE NUMBER: [phone],  LAB RESULTS: [phone]  IF A COMPLICATION OR EMERGENCY SITUATION ARISES AND YOU ARE UNABLE TO REACH   YOUR PHYSICIAN - GO DIRECTLY TO THE EMERGENCY ROOM.  MD Moustapha Prabhakar MD  11/15/2018 10:16:15 AM  This report has been verified and signed electronically.  [employer]

## 2018-11-15 NOTE — OR NURSING
Bite block in place.  
Et tube placed per anesthesia.  
Et tube removed per anesthesia,  Bite removed intact. Pt transferred to pacu.  
Family updated as to patient's status.  
Labs are in the computer. Okay to proceed with procedure.   
Pt warming blanket on, sn mistral-air [phone].  
pacu notified of pt's impending arrival.  
no loss of consciousness, no gait abnormality, no headache, no sensory deficits, and no weakness.

## 2018-11-15 NOTE — TRANSFER OF CARE
"Anesthesia Transfer of Care Note    Patient: Ki Roque    Procedure(s) Performed: Procedure(s) (LRB):  ENTEROSCOPY, upper sbe (N/A)    Patient location: PACU    Anesthesia Type: general    Transport from OR: Transported from OR on 6-10 L/min O2 by face mask with adequate spontaneous ventilation    Post pain: adequate analgesia    Post assessment: no apparent anesthetic complications    Post vital signs: stable    Level of consciousness: sedated    Nausea/Vomiting: no nausea/vomiting    Complications: none    Transfer of care protocol was followed      Last vitals:   Visit Vitals  BP (!) 178/78   Pulse 81   Temp (P) 37.1 °C (98.8 °F) (Skin)   Resp 18   Ht 5' 6" (1.676 m)   Wt 79.8 kg (176 lb)   SpO2 96%   BMI 28.41 kg/m²     "

## 2018-11-15 NOTE — PLAN OF CARE
Patient returned to [facility] from PACU, alert and talkative with no c/o pain or discomfort. O2 sat 91% on room air, supplemental O2 administered via nasal cannule.. Monitoring continued. Comfort measures provided,. Side rails up x2.

## 2018-11-15 NOTE — PLAN OF CARE
Patient has met PACU discharge criteria, VSS, denies pain  Family updated by phone. Released from PACU by Dr. Daley

## 2018-11-28 ENCOUNTER — TELEPHONE (OUTPATIENT)
Dept: INFUSION THERAPY | Facility: HOSPITAL | Age: 68
End: 2018-11-28

## 2018-11-28 NOTE — TELEPHONE ENCOUNTER
----- Message from Jennifer Bain LPN sent at 11/27/2018  2:54 PM CST -----  Sandostatin Lar 20mg monthly for 6 months.  [id number].  Please contact pt to schedule.    Thanks.

## 2020-10-28 NOTE — TELEPHONE ENCOUNTER
Pt notified, per Dr. Guillen if he is having abdominal pain and bleeding he should go to nearest ER.  Pt insists on having procedure today or tomorrow.  Pt notified he can contact clinic on Monday morning if bleeding persists   no

## 2023-11-22 NOTE — OR NURSING
Bear hugger removed  
Bite block placed  
Bite block removed and intact   
Family updated as to patient's status.spoke to pts son taz  
Kishore lau [facility] # [phone]  
Pt extubated per anesthesia ,vss,pt tolerated well.  
Pt intubated per anesthesia ,vss,pt reshma well.  
Spoke to manpreet in [place] in regards to pt being transported to pacu.  
Transporting pt via stretcher to pacu with anesthesia at bedside the patient reshma well  
9

## 2024-09-05 NOTE — TELEPHONE ENCOUNTER
Hospice referral per family request   Per [employer], auth is not required for this procedure.  Thanks abd